# Patient Record
Sex: MALE | Race: WHITE
[De-identification: names, ages, dates, MRNs, and addresses within clinical notes are randomized per-mention and may not be internally consistent; named-entity substitution may affect disease eponyms.]

---

## 2017-01-24 ENCOUNTER — HOSPITAL ENCOUNTER (EMERGENCY)
Dept: HOSPITAL 68 - ERH | Age: 37
End: 2017-01-24
Payer: COMMERCIAL

## 2017-01-24 VITALS — BODY MASS INDEX: 31.5 KG/M2 | HEIGHT: 71 IN | WEIGHT: 225 LBS

## 2017-01-24 VITALS — DIASTOLIC BLOOD PRESSURE: 88 MMHG | SYSTOLIC BLOOD PRESSURE: 128 MMHG

## 2017-01-24 DIAGNOSIS — Z87.891: ICD-10-CM

## 2017-01-24 DIAGNOSIS — J40: Primary | ICD-10-CM

## 2017-01-24 NOTE — ED INFLUENZA/URI COMPLAINT
History of Present Illness
 
General
Chief Complaint: Upper Respiratory Sx/Fever
Stated Complaint: "I HAVE BRONCHITIS"
Source: patient
Exam Limitations: no limitations
 
Vital Signs & Intake/Output
Vital Signs & Intake/Output
 Vital Signs
 
 
Date Time Temp Pulse Resp B/P Pulse O2 O2 Flow FiO2
 
     Ox Delivery Rate 
 
01/24 1544 98.7 101 18 128/88 99 Room Air  
 
 
 ED Intake and Output
 
 
 01/25 0000 01/24 1200
 
Intake Total  
 
Output Total  
 
Balance  
 
   
 
Patient 225 lb 
 
Weight  
 
 
Allergies
Coded Allergies:
Penicillins (ANAPHYLAXIS 01/24/17)
aspirin (ANAPHYLAXIS 01/24/17)
 
Reconcile Medications
Albuterol Sulfate (Proair Hfa) 90 MCG HFA.AER.AD   2 PUF INH Q4-6 PRN PRN SOB
Atorvastatin Calcium 20 MG TABLET   1 TAB PO DAILY CHOLESTEROL  (Reported)
Azithromycin 250 MG TABLET   1 DP PO AD BRONCHITIS
     2 the first day followed by 1 for days 2-5
Benzonatate (Tessalon Perle) 100 MG CAPSULE   1 CAP PO TID COUGH
Insulin Detemir (Levemir Flextouch) 100 UNIT/ML (3 ML) INSULN.PEN   20 UNIT SC 
BID DM  (Reported)
Insulin Lispro (Humalog Kwikpen U-100) 100 UNIT/ML INSULN.PEN   20 UNITS SC BID 
DM  (Reported)
Lisinopril 20 MG TABLET   1 TAB PO DAILY BP  (Reported)
Omeprazole 40 MG CAPSULE.DR   1 CAP PO DAILY GI  (Reported)
Pregabalin (Lyrica) 75 MG CAPSULE   1 CAP PO BID NERVE PAIN  (Reported)
 
Triage Note:
37 Y/O MALE C/O COUGH X 2 WEEKS.  STATES "I THINK
I HAVE BRONCHITIS".  SPEAKING CLEARLY WITH NO
RESP DISTRESS NOTED.  PT STATES HE IS A DIABETIC
BUT "I DONT WANT THAT CHECKED WHILE I AM HERE
TODAY.  I JUST CAME FROM MY ENDOCRINOLOGST OFFICE
SO THATS ALL SET".  PT ALSO STATING "I WANT TO BE
CHECKED OUT FOR IRRITABLE BOWEL WHILE I AM HERE"
C/O "BUBBLING" IN STOMACH AND DIARRHEA.  AFEBRILE.
Triage Nurses Notes Reviewed? yes
Onset: Gradual
Duration: week(s): (2)
Timing: remote history
Severity: moderate
Severity Numbers: 6
Prior Episodes/Possible Cause: occassional episodes
No Modifying Factors: none
HPI:
Patient is a 36-year-old male presenting to the emergency department with chief 
complaint of dry cough has been going on for the past 2 weeks.  Also reporting 
upper respiratory symptoms.  Denies any fevers or chills.  Cough is worse at 
night.  Denies any lower extremity edema.  No recent travel.  No recent 
antibiotic use. ALSO complaining of diarrhea has been going on for the past 
several years.  Denies any black stool.  No abdominal denies any weakness or 
malaise.
(SANDY SPEARS)
 
Past History
 
Travel History
Traveled to [place] past 21 day No
 
Medical History
Any Pertinent Medical History? see below for history
Neurological: NONE
EENT: NONE
Cardiovascular: hypertension, HIGH CHOLESTEROL
Respiratory: NONE
Gastrointestinal: irritable bowel syndrome
Hepatic: NONE
Renal: NONE
Musculoskeletal: NONE
Psychiatric: NONE
Endocrine: diabetes
Blood Disorders: NONE
Cancer(s): NONE
GYN/Reproductive: NONE
 
Surgical History
Surgical History: non-contributory
 
Psychosocial History
What is your primary language English
Tobacco Use: Quit >30 days ago
 
Family History
Hx Contributory? No
(SANDY SPEARS)
 
Review of Systems
 
Review of Systems
Constitutional:
Reports: no symptoms. 
Comments
Review of systems: See HPI, All other systems negative.
Constitutional, no chills fever or weight loss
HEENT: No visual changes no sore throat no congestion
Cardiovascular: No chest pain ,palpitation 
Skin, no jaundice no rashes
Respiratory: No dyspnea  sputum or hemoptysis
GI: No nausea no vomiting
: No dysuria No hematuria
Muscle skeletal: no back pain, no neck pain,
Neurologic: No numbness no confusion
Psych: No stress anxiety or depression,.
Heme/endocrine: No bruising no bleeding 
Immunology: No splenectomy or history of AIDS
(SANDY SPEARS)
 
Physical Exam
 
Physical Exam
General Appearance: well developed/nourished, no apparent distress, alert, awake
, comfortable
Ears, Nose, Throat: nasal congestion, nasal drainage
Comments:
Well-developed well-nourished person in no acute distress
HEENT:. Pupils equally round and reactive to light and accommodation. Nose is 
atraumatic. External auditory canal and Tympanic membranes clear. Pharynx 
normal. No swelling or edema.  Clear nasal discharge bilaterally.
Neck: Supple, no lymphadenopathy, normal range of motion without pain or 
tenderness
Back: Nontender, no CVA tenderness. Full range of motion
Cardiovascular: Regular rate and rhythms no murmurs rubs or gallops, normal JVP
Respiratory: Chest nontender. No respiratory distress.breath sounds clear to 
auscultation bilaterally.  Dry reactive cough on exam.
Abdomen: Soft nontender nondistended, bowel sounds throughout
Extremity: No edema
Neuro: Alert oriented x3.
Skin: No appreciable rash on exposed skin, skin is warm and dry.
Psych: Mood and affect is normal, memory and judgment is normal.
 
Core Measures
Severe Sepsis Present: No
Septic Shock Present: No
(SANDY SPEARS)
 
Progress
Differential Diagnosis: pharyngitis, sinusitis, bronchitis
Plan of Care:
 Current Medications
 
 
  Sig/Rubina Start time  Last
 
Medication Dose  Stop Time Status Admin
 
Dexamethasone 8 MG ONCE ONE 01/24 1715 UNVr 
 
(Decadron)   01/24 1716  
 
 
Initial ED EKG: none
Comments:
Patient will follow up with primary care physician, may need to follow-up with 
GI for further evaluation of chronic diarrhea.  Patient nontoxic.
(SANDY SPEARS)
 
Departure
 
Departure
Time of Disposition: 1702
Disposition: HOME OR SELF CARE
Condition: Stable
Clinical Impression
Primary Impression: Bronchitis
Secondary Impressions: 
Diarrhea
Qualifiers:  Diarrhea type: unspecified type Qualified Code: R19.7 - Diarrhea, 
unspecified
 
Referrals:
WESLEY CULVER,CITLALLI KATHLEEN MD,MITCHEL (PCP/Family)
 
EMMANUEL CLINTON MD
 
Additional Instructions:
Follow-up with your primary care physician collimating of pain.  Also follow-up 
with gastroenterology for chronic diarrhea.  Increase fluids.  Take Z-Jacky as 
prescribed for bronchitis, use albuterol inhaler as directed, take Process 
Prescribed.  YOU WERE Given a Dose of Decadron Here in the Emergency Department.
Departure Forms:
Customer Survey
General Discharge Information
Prescriptions:
Current Visit Scripts
Azithromycin 1 DP PO AD 
     #6 TAB 
     2 the first day followed by 1 for days 2-5
 
Albuterol Sulfate (Proair Hfa) 2 PUF INH Q4-6 PRN PRN SOB
     #1 INHAL 
 
Benzonatate (Tessalon Perle) 1 CAP PO TID 
     #30 CAP 
 
 
(SANDY SPEARS)
 
PA/NP Co-Sign Statement
Statement:
ED Attending supervision documentation-
 
[] I saw and evaluated the patient. I have also reviewed all the pertinent lab 
results and diagnostic results. I agree with the findings and the plan of care 
as documented in the PA's/NP's documentation. 
 
[X] I have reviewed the ED Record and agree with the PA's/NP's documentation.
 
[] Additions or exceptions (if any) to the PAs/NP's note and plan are 
summarized below:
[]
 
(MAGGI BAKER DO)

## 2017-02-13 ENCOUNTER — HOSPITAL ENCOUNTER (INPATIENT)
Dept: HOSPITAL 68 - ERH | Age: 37
LOS: 2 days | DRG: 638 | End: 2017-02-15
Attending: INTERNAL MEDICINE | Admitting: INTERNAL MEDICINE
Payer: COMMERCIAL

## 2017-02-13 VITALS — BODY MASS INDEX: 31.5 KG/M2 | WEIGHT: 225 LBS | HEIGHT: 71 IN

## 2017-02-13 DIAGNOSIS — I10: ICD-10-CM

## 2017-02-13 DIAGNOSIS — N17.9: ICD-10-CM

## 2017-02-13 DIAGNOSIS — E10.10: Primary | ICD-10-CM

## 2017-02-13 DIAGNOSIS — R35.8: ICD-10-CM

## 2017-02-13 DIAGNOSIS — Z79.4: ICD-10-CM

## 2017-02-13 DIAGNOSIS — E10.40: ICD-10-CM

## 2017-02-13 DIAGNOSIS — E10.319: ICD-10-CM

## 2017-02-13 DIAGNOSIS — T38.3X6A: ICD-10-CM

## 2017-02-13 DIAGNOSIS — Z91.128: ICD-10-CM

## 2017-02-13 DIAGNOSIS — Z87.891: ICD-10-CM

## 2017-02-13 DIAGNOSIS — R63.1: ICD-10-CM

## 2017-02-13 DIAGNOSIS — F32.9: ICD-10-CM

## 2017-02-13 LAB
ABSOLUTE GRANULOCYTE CT: 7.4 /CUMM (ref 1.4–6.5)
BASOPHILS # BLD: 0 /CUMM (ref 0–0.2)
BASOPHILS NFR BLD: 0.3 % (ref 0–2)
EOSINOPHIL # BLD: 0 /CUMM (ref 0–0.7)
EOSINOPHIL NFR BLD: 0 % (ref 0–5)
ERYTHROCYTE [DISTWIDTH] IN BLOOD BY AUTOMATED COUNT: 13.3 % (ref 11.5–14.5)
GRANULOCYTES NFR BLD: 80.2 % (ref 42.2–75.2)
HCT VFR BLD CALC: 39.9 % (ref 42–52)
LYMPHOCYTES # BLD: 1.3 /CUMM (ref 1.2–3.4)
MCH RBC QN AUTO: 29 PG (ref 27–31)
MCHC RBC AUTO-ENTMCNC: 34 G/DL (ref 33–37)
MCV RBC AUTO: 85.3 FL (ref 80–94)
MONOCYTES # BLD: 0.5 /CUMM (ref 0.1–0.6)
PLATELET # BLD: 402 /CUMM (ref 130–400)
PMV BLD AUTO: 7.5 FL (ref 7.4–10.4)
RED BLOOD CELL CT: 4.68 /CUMM (ref 4.7–6.1)
WBC # BLD AUTO: 9.3 /CUMM (ref 4.8–10.8)

## 2017-02-13 PROCEDURE — S5012 5% DEXTROSE WITH POTASSIUM: HCPCS

## 2017-02-13 PROCEDURE — G0480 DRUG TEST DEF 1-7 CLASSES: HCPCS

## 2017-02-13 NOTE — NUR
PT BIBA FOR HIGH BLOOD SUGAR. HX:DIABETIC AND PT
HAS NOT BEEN TAKING INSULIN AS SUPPOSED TO SINCE
SATURDAY. BS HERE 462. PT REPORTS TAKING FAST
ACTING INSULIN APPROX 2HRS AGO. C/O NAUSEA AND
DIZZINESS.

## 2017-02-13 NOTE — NUR
**CRITICAL TEST RESULTS**
 
[id number] NEO CAMACHO 36 M
 
TESTS AND RESULTS: GLUCOSE 556
 
Results received and read back by:   JED LUNA
   Results received date and time:   02/13/17 2244
 
The following provider was notified of the results, and read the results back:
                       
       
Notified date and time:  02/13/17 at 6577

## 2017-02-13 NOTE — ED GENERAL ADULT
History of Present Illness
 
General
Chief Complaint: General Adult
Stated Complaint: HIGH GLUCOSE
Source: patient, family, old records, EMS
Exam Limitations: no limitations
 
Vital Signs & Intake/Output
Vital Signs & Intake/Output
 Vital Signs
 
 
Date Time Temp Pulse Resp B/P Pulse O2 O2 Flow FiO2
 
     Ox Delivery Rate 
 
02/13 2248      Room Air  
 
02/13 2216 97.5 110 20 144/65 100 Room Air  
 
 
 ED Intake and Output
 
 
 02/14 0000 02/13 1200
 
Intake Total 1000 
 
Output Total  
 
Balance 1000 
 
   
 
Intake, IV 1000 
 
Patient 200 lb 
 
Weight  
 
 
Allergies
Coded Allergies:
Penicillins (ANAPHYLAXIS 01/24/17)
aspirin (ANAPHYLAXIS 01/24/17)
 
Reconcile Medications
Albuterol Sulfate (Proair Hfa) 90 MCG HFA.AER.AD   2 PUF INH Q4-6 PRN PRN SOB
Atorvastatin Calcium 20 MG TABLET   1 TAB PO DAILY CHOLESTEROL  (Reported)
Ergocalciferol (Vitamin D2) (Vitamin D2) 50,000 UNIT CAPSULE   1 CAP PO Q2W 
SUPPLEMENT  (Reported)
Insulin Detemir (Levemir Flextouch) 100 UNIT/ML (3 ML) INSULN.PEN   20 UNIT SC 
BID DM  (Reported)
Insulin Lispro (Humalog Kwikpen U-100) 100 UNIT/ML INSULN.PEN DM  (Reported)
Lisinopril 20 MG TABLET   1 TAB PO DAILY BP  (Reported)
Omeprazole 40 MG CAPSULE.DR   1 CAP PO DAILY GI  (Reported)
Pregabalin (Lyrica) 150 MG CAPSULE   1 CAP PO DAILY NERVE PAIN  (Reported)
 
Triage Note:
PT BIBA FOR HIGH BLOOD SUGAR. HX:DIABETIC AND PT
 HAS NOT BEEN TAKING INSULIN AS SUPPOSED TO SINCE
 SATURDAY. BS HERE 462. PT REPORTS TAKING FAST
 ACTING INSULIN APPROX 2HRS AGO. C/O NAUSEA AND
 DIZZINESS.
Triage Nurses Notes Reviewed? yes
HPI:
Patient states that yesterday he was mad at his wife's we did not take any of 
his insulin.  Today he states that his blood sugar has been running high so he 
has been taking multiple doses of short-acting insulin without any relief.  
Patient is now nauseous and dry heaving.  Patient states he is getting an 
epigastric burning sensation which then radiates into his throat.  The symptoms 
are constant all day.  There no aggravating or mitigating factors.  Patient 
denies any chest pain or shortness of breath.  He rates the burning sensation as
4 out of 10.
 
Past History
 
Medical History
Any Pertinent Medical History? see below for history
Neurological: NONE
EENT: NONE
Cardiovascular: hypertension, HIGH CHOLESTEROL
Respiratory: NONE
Gastrointestinal: irritable bowel syndrome
Hepatic: NONE
Renal: NONE
Musculoskeletal: NONE
Psychiatric: NONE
Endocrine: diabetes
Blood Disorders: NONE
Cancer(s): NONE
GYN/Reproductive: NONE
 
Surgical History
Surgical History: non-contributory
 
Psychosocial History
What is your primary language English
Tobacco Use: Never used
ETOH Use: occasional use
Illicit Drug Use: denies illicit drug use
 
Family History
Hx Contributory? No
 
Review of Systems
 
Review of Systems
Constitutional:
Reports: no symptoms. 
EENTM:
Reports: no symptoms. 
Respiratory:
Reports: no symptoms. 
Cardiovascular:
Reports: no symptoms. 
GI:
Reports: see HPI, abdominal pain, nausea. 
Genitourinary:
Reports: no symptoms. 
Musculoskeletal:
Reports: no symptoms. 
Skin:
Reports: no symptoms. 
Neurological/Psychological:
Reports: no symptoms. 
Hematologic/Endocrine:
Reports: no symptoms. 
Immunologic/Allergic:
Reports: no symptoms. 
All Other Systems: Reviewed and Negative
 
Physical Exam
 
Physical Exam
General Appearance: well developed/nourished, alert, awake, anxious, moderate 
distress
Head: atraumatic, normal appearance
Eyes:
Bilateral: PERRL, EOMI. 
Ears, Nose, Throat: normal pharynx, DRY MUCOSA
Neck: normal inspection, supple, full range of motion
Respiratory: normal breath sounds, chest non-tender, no respiratory distress, 
lungs clear
Cardiovascular: normal peripheral pulses, tachycardia
Gastrointestinal: normal bowel sounds, soft, non-tender, no organomegaly
Back: normal inspection
Extremities: normal inspection, normal capillary refill, normal range of motion,
no edema
Neurologic/Psych: no motor/sensory deficits, awake, alert, oriented x 3, normal 
mood/affect
Skin: intact, normal color, warm/dry
Lymphatic: no anterior cervical gary
 
Core Measures
ACS in differential dx? No
CVA/TIA Diagnosis: No
Severe Sepsis Present: No
Septic Shock Present: No
 
Progress
Differential Diagnoses
I considered the following diagnoses in my evaluation of the patient: [DKA, 
electrolyte abnormality, dehydration]
 
Plan of Care:
 Orders
 
 
Procedure Date/time Status
 
Patient Data 02/14 0009 Active
 
Admit to inpatient 02/14 0004 Active
 
MIXED VENOUS BLOOD GAS (GEN) 02/13 2157 Complete
 
URINALYSIS 02/13 2157 Complete
 
TROPONIN LEVEL 02/13 2157 Complete
 
LIPASE 02/13 2157 Complete
 
COMPREHENSIVE METABOLIC PANEL 02/13 2157 Complete
 
CBC WITHOUT DIFFERENTIAL 02/13 2157 Complete
 
AMYLASE 02/13 2157 Complete
 
ACETONE 02/13 2157 Complete
 
EKG 02/13 2157 Active
 
 
 Current Medications
 
 
  Sig/Rubina Start time  Last
 
Medication Dose  Stop Time Status Admin
 
Famotidine 20 MG ONCE ONE 02/14 0015 AC 
 
(Pepcid)   02/14 0016  
 
Insulin Human Regular 100 UNIT Q24H 02/13 2345 UNVr 
 
(Novolin R (Insulin      
 
Drip))     
 
Sodium Chloride 100 ML    
 
(Normal Saline 0.9%)     
 
 
 Laboratory Tests
 
 
 
02/13/17 2340:
Urinalysis MOD  H, Urine Color YEL, Urine Clarity CLEAR, Urine pH 5.5, Ur 
Specific Gravity 1.025, Urine Protein TRACE  H, Urine Ketones 40  H, Urine 
Nitrite NEG, Urine Bilirubin POS@ICTO  H, Urine Urobilinogen 0.2, Ur Leukocyte 
Esterase NEG, Ur Microscopic SEDIMENT EXAMINED, Urine RBC 1-3, Urine WBC 1-3  H,
Ur Epithelial Cells FEW, Urine Mucus MOD  H, Urine Hemoglobin NEG, Urine Glucose
>=1000  H
 
02/13/17 2218:
Anion Gap 21  H, Estimated GFR 30  L, BUN/Creatinine Ratio 16.8, Glucose 556 *H,
Calcium 9.7, Total Bilirubin 0.8, AST 19, ALT 54, Alkaline Phosphatase 156  H, 
Troponin I < 0.01, Total Protein 7.0, Albumin 4.2, Globulin 2.8, Albumin/
Globulin Ratio 1.5, Amylase < 30  L, Lipase 32, CBC w Diff NO MAN DIFF REQ, RBC 
4.68  L, MCV 85.3, MCH 29.0, RDW 13.3, MPV 7.5, Gran % 80.2  H, Lymphocytes % 
14.3  L, Monocytes % 5.2, Eosinophils % 0, Basophils % 0.3, Absolute 
Granulocytes 7.4  H, Absolute Lymphocytes 1.3, Absolute Monocytes 0.5, Absolute 
Eosinophils 0, Absolute Basophils 0, PUBS MCHC 34.0, Acetone Level POSITIVE AT 1
:16 DIL
 
02/13/17 2210:
Bicarbonate Actual 16  L, Mixed VBG pH 7.34, Mixed VBG pCO2 31  L, Mixed VBG O2 
Saturation 37, Carboxyhemoglobin 1.0  L, O2 Concentration % R/A, Phlebotomy Draw
Site VENOUS
Initial ED EKG: SINUS TACHYCARDIA WITH NO st-t CHANGES.
 
Departure
 
Departure
Disposition: STILL A PATIENT
Condition: Guarded
Clinical Impression
Primary Impression: DKA (diabetic ketoacidoses)
Secondary Impressions: Acute renal failure
Referrals:
MITCHEL KATHLEEN MD (PCP/Family)
 
Departure Forms:
Customer Survey
General Discharge Information
 
Admission Note
Spoke With:
KINGSTON LOBO MD
Documentation of Exam:
Documentation of any treatments & extenuating circumstances including Concerns 
Regarding Discharge (functional status, medication knowledge or non-compliance, 
living conditions, etc.) that warrant an admission rather than observation: [IV 
FLUIDS, INSUILIN DRIP, ENDOCRINE CONSULT, RENAL CONSULT]
 
 
Critical Care Note
 
Critical Care Note
Critical Care Time: mins: (45 MIN)

## 2017-02-14 VITALS — DIASTOLIC BLOOD PRESSURE: 60 MMHG | SYSTOLIC BLOOD PRESSURE: 118 MMHG

## 2017-02-14 VITALS — DIASTOLIC BLOOD PRESSURE: 60 MMHG | SYSTOLIC BLOOD PRESSURE: 122 MMHG

## 2017-02-14 NOTE — HISTORY & PHYSICAL
TRACIE CULVER,[facility] 17 0017:
General Information and HPI
MD Statement:
I have seen and personally examined NEO RODRIGUEZ and documented this H&P.
 
The patient is a 36 year old M who presented with a patient stated chief 
complaint of nausea, abdominal pain and generalized weakness. 
 
Source of Information: patient, family, old records
Exam Limitations: no limitations, unable to give history, patient's age
History of Present Illness:
Mr Rodriguez is a 36-year-old gentleman with past medical history of type 1 
diabetes mellitus, depression, questionable irritable bowel syndrome, as well as
multiple admissions for DKA(last one approximately 18 months ago at [facility]) who presented to the emergency 2017 after experiencing nausea, 
abdominal pain, polyuria and polydipsia.  Patient states on 2017 
when feeling extremely depressed he stopped taking in any food.  
He continued consuming high volume of water.  
On 2017 the patient reported that he began to feel nauseous and had 
multiple episodes of dry heaving.  On 2017 in the early hours the 
patient began taking in oral intake and states that he began with a short acting
insulin.  In the early evening while at the [place] train station he reports 
increased weakness and subsequently asked the guard on duty to notify EMS who 
brought him to the emergency department at [facility]. Patient also 
endorses chest pain secondary to what he claims is a long history of GERD.  
He also reports a non productive cough and states that he's recently completed a
dose of azithromycin following a visit to the emergency department at [facility] on 2017.
 
 
Allergies/Medications
Allergies:
Coded Allergies:
Penicillins (ANAPHYLAXIS 17)
aspirin (ANAPHYLAXIS 17)
 
Home Med list
Albuterol Sulfate (Proair Hfa) 90 MCG HFA.AER.AD   2 PUF INH Q4-6 PRN PRN SOB
Atorvastatin Calcium 20 MG TABLET   1 TAB PO DAILY CHOLESTEROL  (Reported)
Ergocalciferol (Vitamin D2) (Vitamin D2) 50,000 UNIT CAPSULE   1 CAP PO Q2W 
SUPPLEMENT  (Reported)
Insulin Detemir (Levemir Flextouch) 100 UNIT/ML (3 ML) INSULN.PEN   20 UNIT SC 
BID DM  (Reported)
Insulin Detemir (Levemir Flextouch) 100 UNIT/ML (3 ML) INSULN.PEN   16 UNIT SC 
BID DM
Insulin Lispro (Humalog Kwikpen U-100) 100 UNIT/ML INSULN.PEN   0 SC TIDAC DM  (
Reported)
     Based on carbohydrate counting sliding scale
Lisinopril 20 MG TABLET   1 TAB PO DAILY BP  (Reported)
Omeprazole 40 MG CAPSULE.DR   1 CAP PO DAILY GI  (Reported)
Pregabalin (Lyrica) 150 MG CAPSULE   1 CAP PO DAILY NERVE PAIN  (Reported)
 
Compliance With Home Meds: POOR
 
Past History
 
Travel History
Traveled to [place] past 21 day No
 
Medical History
Neurological: NONE
EENT: NONE
Cardiovascular: hypertension, HIGH CHOLESTEROL
Respiratory: NONE
Gastrointestinal: irritable bowel syndrome
Hepatic: NONE
Renal: NONE
Musculoskeletal: NONE
Psychiatric: NONE
Endocrine: diabetes
Blood Disorders: NONE
Cancer(s): NONE
GYN/Reproductive: NONE
 
Surgical History
Surgical History: non-contributory
 
Past Family/Social History
 
Family History
Relations & Conditions if any
FATHER, , Age 50-60; Cause: Cancer.
 
 
Psychosocial History
Where do you live? Other ([place])
Who Do You Live With? spouse, child
Services at Home: None
Primary Language: English
Smoking Status: Former Smoker
ETOH Use: occasional use, Social Drinker 
Illicit Drug Use: denies illicit drug use
 
Functional Ability
ADLs
Independent: dressing, eating, toileting, bathing. 
Ambulation: independent
IADLs
Independent: shopping, housework, finances, food prep, telephone, transportation
, medication admin. 
 
Employment History
Employment Unemployed
 
Review of Systems
 
Review of Systems
Constitutional:
Denies: chills, diaphoresis, fever, malaise, weakness. 
Cardiovascular:
Reports: chest pain.  Denies: edema, orthopena, palpitations. 
Respiratory:
Reports: cough.  Denies: hemoptysis, orthopnea, short of breath, sputum 
production, wheezing. 
GI:
Reports: abdominal pain, diarrhea, vomiting.  Denies: distention, bowel 
incontinence, nausea, bloody stool, changes in stool. 
Genitourinary:
Denies: discharge, dysuria, frequency, hematuria. 
Musculoskeletal:
Denies: back pain, gout, joint pain, joint swelling. 
Skin:
Denies: change in skin color, change in hair/nails. 
 
Exam & Diagnostic Data
Last 24 Hrs of Vital Signs/I&O
 Vital Signs
 
 
Date Time Temp Pulse Resp B/P Pulse O2 O2 Flow FiO2
 
     Ox Delivery Rate 
 
 0032 98.3 113 18 121/57 100 Room Air  
 
 2248      Room Air  
 
2216 97.5 110 20 144/65 100 Room Air  
 
 
 Intake & Output
 
 
  0800  0000  1600
 
Intake Total  1000 
 
Output Total   
 
Balance  1000 
 
    
 
Intake, IV  1000 
 
Patient  90.718 kg 
 
Weight   
 
 
 
 
Physical Exam
General Appearance Alert, Oriented X3, Cooperative, No Acute Distress
HEENT Atraumatic, PERRLA
Neck Supple
Lymphatic Axillary nl
Cardiovascular Normal S1, Normal S2, No Murmurs
Lungs Clear to Auscultation
Abdomen Normal Bowel Sounds, Soft, No Tenderness
Neurological Sensation Intact, Cranial Nerves 3-12 NL, Slow Speech. 
Extremities No Cyanosis, No Edema
Last 24 Hrs of Labs/[place]:
 Laboratory Tests
 
170:
Urinalysis MOD  H, Urine Color YEL, Urine Clarity CLEAR, Urine pH 5.5, Ur 
Specific Gravity 1.025, Urine Protein TRACE  H, Urine Ketones 40  H, Urine 
Nitrite NEG, Urine Bilirubin POS@ICTO  H, Urine Urobilinogen 0.2, Ur Leukocyte 
Esterase NEG, Ur Microscopic SEDIMENT EXAMINED, Urine RBC 1-3, Urine WBC 1-3  H,
Ur Epithelial Cells FEW, Urine Mucus MOD  H, Urine Hemoglobin NEG, Urine Glucose
>=1000  H
 
17:
Anion Gap 21  H, Estimated GFR 30  L, BUN/Creatinine Ratio 16.8, Glucose 556 *H,
Calcium 9.7, Total Bilirubin 0.8, AST 19, ALT 54, Alkaline Phosphatase 156  H, 
Troponin I < 0.01, Total Protein 7.0, Albumin 4.2, Globulin 2.8, Albumin/
Globulin Ratio 1.5, Amylase < 30  L, Lipase 32, CBC w Diff NO MAN DIFF REQ, RBC 
4.68  L, MCV 85.3, MCH 29.0, RDW 13.3, MPV 7.5, Gran % 80.2  H, Lymphocytes % 
14.3  L, Monocytes % 5.2, Eosinophils % 0, Basophils % 0.3, Absolute 
Granulocytes 7.4  H, Absolute Lymphocytes 1.3, Absolute Monocytes 0.5, Absolute 
Eosinophils 0, Absolute Basophils 0, PUBS MCHC 34.0, Acetone Level POSITIVE AT 1
:16 DIL
 
17:
Bicarbonate Actual 16  L, Mixed VBG pH 7.34, Mixed VBG pCO2 31  L, Mixed VBG O2 
Saturation 37, Carboxyhemoglobin 1.0  L, O2 Concentration % R/A, Phlebotomy Draw
Site VENOUS
 
 
Assessment/Plan
Assessment:
This is a 36-year-old gentleman with long-standing history of diabetes who 
presented to the emergency department with elevated blood sugar of 556 in 
addition to the above he also endorsed abdominal pain, nausea, polyuria and 
polydipsia.
 
#Diabetic ketoacidosis.
Admit patient to ICU. 
Patient's blood sugar  on admission, 556 as well as increased anion gap of 21 
suggested the patient is in diabetic ketoacidosis.  We will continue hydrating 
the patient.  Patient is currently on an insulin drip running at 8 units.  We 
will tailor drip to ensure anion gap is closing.
Accu-Cheks every hour.
Once blood sugars below 250 consider sliding scale insulin.  
If patient's anion gap increases (>21) continue management however change IV 
normal saline  to D5 to ensure patient does not become hypoglycemic. 
Endocrinology consultation in AM.
Above plans were discussed with Dr. Falk.
For Nausea, Continue Zofran 4 mg IV. 
 
#Acute kidney injury.
Patient's creatinine upon admission was 2.5.  This an elevation from his 
baseline of 1.0. Likely Pre-renal, due to decreased PO intake and DKA.
Continue IV hydration.  
Monitor renal function in a.m.
Avoid any nephrotoxic medications and oral agents.
 
#History of mood disturbance
Continue the patient on Lyrica.
The patient states that he is not actively suicidal.  He denies any homicidal 
intention.  He denies any interest or intention and hurting his significant 
other.  
Patient states that he would not like a psychiatry consult and refused when 
repeatedly asked for permission to request one.
Can consider asking again in the AM.
 
#Questionable irritable bowel syndrome
If Symptoms worsen consider GI cosult. 
Given recent Antibiotic Use, if symptoms worsen, consider Stool Culture.
 
#Diet 
nothing by mouth
 
#DVT prophylaxis 
Lovenox
 
#Code 
Full code 
 
As Ranked By This Provider
Problem List:
 1. DKA (diabetic ketoacidoses)
 
 2. Diarrhea
 
 3. Bronchitis
 
 
Core Measures/Miscellaneous
 
Acute Coronary Syndrome
ACS Diagnosis: No
 
Cerebrovascular Accident
CVA/TIA Diagnosis: No
 
Congestive Heart Failure
CHF Diagnosis: No
 
Venous Thromboembolism
VTE Risk Factors: Acute medical illness
VTE Prophylaxis Ordered Inpt: Pharm- Heparin
No Mech VTE prophylaxis d/t: No contraindications
No VTE Pharm Prophylaxis d/t: No contraindications
VTE Diagnosis: No
VTE Type: NONE
VTE Confirmed by (Test): NONE
 
Severe Sepsis
Severe Sepsis Present: No
 
Septic Shock
Septic Shock Present: No
 
Miscellaneous Documentation
Attending Case Discussed With:
KINGSTON LOBO MD
 
Primary Care Physician:
MITCHEL KATHLEEN MD
 
Patient sees these Specialists
Dr Vega
Level of Patient Care: Critical Care (CRI)
 
 
MARILYN ROCKWELL 17 0205:
Resident Review Statement
Resident Statement: examined this patient, discussed with intern, agreed with 
intern, discussed with family, reviewed EMR data (avail), discussed with nursing
, discussed with case mgmt, reviewed images, amended to note
Other Findings:
36-year-old gentleman with history of type 1 diabetes, multiple admissions due 
to noncompliance with insulin treatment (latest one about a year and half ago at
[facility] with a diagnosis of DKA) presented to the emergency room 
complaining of 3-day, progressive weakness, nausea and vmitting and abdominal 
pain.  Patient is a poor historian and the details of the HPI was confirmed with
his wife who presents at the bedside.  He follows up with Dr. Vega for his 
diabetes and about 3 weeks ago started on by mouth diet and insulin therapy and 
according to him he has been more compliant than before.  On , after he
" felt depressed" and  refused to eating food and taking his insulin.  Patient 
reports weakness, headache, mild abdominal pain, polyuria and polydipsia and 
nocturia.  He denies any fever, chills, runny nose sputum production sick 
contact or recent travel, and  symptoms.  Yesterday morning, he restarted 
taking his short-acting insulin per meal with minimal improvement of his 
symptoms. 
Social history: Patient denies illicit drugs/smoking, quit drinking alcohol 
while ago, currently unemployed, has multiple incarceration and legal. 
ROS: + Headache, mild nausea status and abdominal pain and weakness, and dry 
mouth.  VS: WNL; Ph/Ex: ao x3, not in acute distress; HEET: Mucous membranes are
dry; CV: S1-S2 no murmur; abdomen: Mild generalized tenderness no guarding no 
rebound; extremities: Warm; normal capillary refilling time. Back: there is a 
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
Personal findings 
EKG sinus rhythm rate 108 normal axis, no prolongation of QT MS and QRS no 
bundle branch block no STT segment change; 
Troponin less than 0.01, sodium 134, potassium 5.4, BUN 42 creatinine 2.5 anion 
gap 21 glucose 556 ,amylase and lipase normal LFTs normal, WBC 9.3 with left 
shift and 4 band, H and H within normal limits; ABG bicarbonate 16 pH 7.34 PCO2 
31
Assessment and plan
36-year-old gentleman was admitted to ICU for diabetes ketoacidosis. 
List of active problems
#1 DKA: The cause of DKA for this patient considering his history of 
noncompliance with insulin treatment and his current presentation is most 
possibly insulin deficiency.  Other causes including infection, inflammation and
infarction has been worked up. 
Plan
* Admit to ICU
* Nothing by mouth
* Continue IV hydration with normal saline
* accu check Q1 
* Continue insulin drip; adjust insulin drip per Accu-Chek every hour
* As soon as ordering gap closes start the patient on long-acting or 
subcutaneous insulin and breech one hour with insulin drip
* If blood glucose <250 mg/dL and ongoing gap is is still elevated started 
patient on D5 normal saline and keep insulin drip going 
* Check ICU bundle every 4 hours for ongoing gap and potassium
* Follow endocrinology recommendation in the a.m.
#2 CHAZ: Most possibly due to profound dehydration in the setting of diabetes 
ketoacidosis
* Continue IV hydration
* Repeat ICU bundle in the a.m.
* Hold nephrotoxic agents; patient's lisinopril was held, restart after AKA I 
and hyperkalemia was resolved
#3 history of depression without suicidal and homicidal ideation or plan
* Patient refused to see a psychiatric wrist in the hospital
* social Work consult in the a.m.
Diet: Nothing by mouth
Full code
Moderate pain pathway
 
 
KINGSTON LOBO 17 0352:
Attending MD Review Statement
 
Attending Statement
Attending MD Statement: examined this patient, discuss w/resident/PA/NP, agreed 
w/resident/PA/NP, discussed with family, reviewed EMR data (avail), reviewed 
images, amended to note
Attending Assessment/Plan:
 
CC: High blood glucose
PMH: Type I DM, HTN, diabetic neuropathy, depression 
 
Patient states that he had an episode of "acute phase of depression" on 2 days 
back so he stopped taking insulin, stopped eating. According to ER notes, he was
mad at his wife's we did not take any of his insulin. He was having weakness, 
abdominal pain, nausea. He tried to resume his short acting insulin and try to 
eat but sugars were uncontrolled. Today his blood sugar has been running high 
even after multiple doses of short-acting insulin. So he came to ER. Patient 
complains of nausea and dry heaving, epigastric burning sensation radiating to 
throat. There no aggravating or mitigating factors. Patient has chronic chest 
pain and mild cough, denies fever, chills, shortness of breath. Patient and his 
wife had been in process of moving to [place], but had been contemplating about 
the move. Patient denies any suicidal ideation, attempts, homicidal ideation, 
hallucinations. He does not want to see psychiatrist for depression. 
 
Vitals: Afebrile, tachycardic, RR, BP, O2 saturation within acceptable range. On
exam: A O 3, appropriate response to questions, no respiratory distress, mucosa
extremely dry, neck supple, no lymphadenopathy, no JVD multiple scratch marks on
body. Mild erythema on the inner aspect of right thigh and neck without any 
inflammation. Peripheral pulses and perfusion normal, no pitting edema. CVS: S1-
S2, RRR, tachycardia. RS: Clear to auscultate bilaterally. Abdomen: Soft, NT, ND
, bowel sounds present. No focal neurological deficits. 
Labs WBC 9.3 with neutrophils 80%, creatinine 2.5 increased from 1.1 from 
, anion gap metabolic acidosis with hyperglycemia, alkaline 
phosphatase 156, acetone positive, potassium 5.4. 
 
A and P 
#1 DKA: Most likely secondary to noncompliance, exclude infections, obtain blood
culture, UA urine culture, lactate level, chest x-ray, trend troponin repeat EKG
to rule out any other precipitating cause. Check U tox, check serum alcohol 
level. Patient received bolus insulin regular in ER, currently on insulin drip, 
continue insulin drip informed endocrinologist, repeat BMP every 4 hours, Accu-
Cheks every hourly, titrate the drip accordingly. If blood sugar dropping change
maintenance fluids to D5 NS with potassium 20-40 mEq. Aggressive hydration with 
at least 3 NS bolus followed by maintenance drip. Once anion gap is closed can 
be changed to basal bolus insulin according to endocrinology recommendation.
 
#2 depression: Intentional stopping insulin, denies suicidal ideation, attempt 
refuses to see psychiatrist
 
#3 hypertension: Hold lisinopril secondary to high
 
#4 acute kidney injury: Probably secondary to volume contraction continue 
aggressive hydration, repeat BMP in a.m., strict I's and O's. 
 
#5 DVT prophylaxis with heparin.
 
TTS 30 min

## 2017-02-14 NOTE — NUR
Patient and family notified this RN that he was scheduled for an outpatient
ultrasound of his spine today at 1430 at [place]. Ultrasound was then called
by this RN and notified them that he has been admitted to room 110- Per
ultrasound supervisor this test can not be completed while he is an inpatient
since it is not related to his admitting diagnosis- Patient and wife notified
and to reschedule appointment after discharge. Dr. Lopez notified of this and
stated that ultrasound is not needed until after discharge.
 
Patient has now been downgraded to general medicine. House staff had been in
previously to dicuss plan of care with pt and wife. C/o of headache that was
relieved by Tylenol for a short period. Ice pack was provided. Motrin ordered
however patient is sleeping comfortably in room- Report given to oncoming RN.
SUSAN

## 2017-02-14 NOTE — ADMISSION CERTIFICATION
Admission Certification
 
Certification Statement
- As attending physician, I certify that at the time of
- admission, based on clinical presentation, severity of
- symptoms, need for further diagnostic testing and
- therapeutic interventions, and risk of adverse outcomes
- without in-hospital treatment, in my clinical assessment,
- this patient requires an acute hospital stay for a minimum
- of two nights or longer. I have also considered psychsocial
- factors such as support system, advanced age, financial
- issues, cognitive issues, and failed out-patient treatments,
- past re-admission history, safety of patient, and lack of
- compliance as applicable.
Specific rationale supporting this admission is:
DKA

## 2017-02-14 NOTE — NUR
NOVOLIN R INSULIN DRIP INITIATED AT 8ML/HR PER COLUMN 2 AS PER 
ORDER FOR BS >500. WILL CTM AND RECHECK.

## 2017-02-14 NOTE — CONS- [facility]
General Information and HPI
 
Consulting Request
Date of Consult: 17
Requested By:
med team
History of Present Illness:
Mr Rodriguez is a 36-year-old gentleman with past medical history of type 1 
diabetes mellitus, depression, questionable irritable bowel syndrome, as well as
multiple admissions for DKA(last one approximately 18 months ago at [facility]) who presented to the emergency 2017 after experiencing nausea, 
abdominal pain, polyuria and polydipsia.  Patient states on 2017 
when feeling extremely depressed he stopped taking in any food.  
He continued consuming high volume of water.  
On 2017 the patient reported that he began to feel nauseous and had 
multiple episodes of dry heaving.  On 2017 in the early hours the 
patient began taking in oral intake and states that he began with a short acting
insulin.  In the early [place] he reports 
increased weakness and subsequently asked the guard on duty to notify EMS who 
brought him to the emergency department at [facility]. Patient also 
endorses chest pain secondary to what he claims is a long history of GERD.  
He also reports a non productive cough and states that he's recently completed a
dose of azithromycin following a visit to the emergency department at [facility] on 2017.
 
He is on Levemir and NovoLog at home.  Apparently he stopped taking his insulin 
wife.  He then tried to resume some insulin but began to feel worse and 
eventually came to the emergency room.
 
The patient has been treated with normal saline as well as an insulin drip.  Had
acute renal insufficiency with a creatinine 2.5 which is now improving with 
hydration.  He can with the patient he states he feels improved.  He is hungry 
and feels he will be able to eat breakfast.
 
Review of Systems
Constitutional:
Denies: chills, diaphoresis, fever, malaise, weakness. 
Cardiovascular:
Reports: chest pain.  Denies: edema, orthopena, palpitations. 
Respiratory:
Reports: cough.  Denies: hemoptysis, orthopnea, short of breath, sputum 
production, wheezing. 
GI:
Reports: abdominal pain, diarrhea, vomiting.  Denies: distention, bowel 
incontinence, nausea, bloody stool, changes in stool. 
Genitourinary:
Denies: discharge, dysuria, frequency, hematuria. 
Musculoskeletal:
Denies: back pain, gout, joint pain, joint swelling. 
Skin:
Denies: change in skin color, change in hair/nails. 
 
 
Allergies/Medications
Allergies:
Coded Allergies:
Penicillins (ANAPHYLAXIS 17)
aspirin (ANAPHYLAXIS 17)
 
Home Med List:
Albuterol Sulfate (Proair Hfa) 90 MCG HFA.AER.AD   2 PUF INH Q4-6 PRN PRN SOB
Atorvastatin Calcium 20 MG TABLET   1 TAB PO DAILY CHOLESTEROL  (Reported)
Ergocalciferol (Vitamin D2) (Vitamin D2) 50,000 UNIT CAPSULE   1 CAP PO Q2W 
SUPPLEMENT  (Reported)
Insulin Detemir (Levemir Flextouch) 100 UNIT/ML (3 ML) INSULN.PEN   20 UNIT SC 
BID DM  (Reported)
Insulin Lispro (Humalog Kwikpen U-100) 100 UNIT/ML INSULN.PEN DM  (Reported)
Lisinopril 20 MG TABLET   1 TAB PO DAILY BP  (Reported)
Omeprazole 40 MG CAPSULE.DR   1 CAP PO DAILY GI  (Reported)
Pregabalin (Lyrica) 150 MG CAPSULE   1 CAP PO DAILY NERVE PAIN  (Reported)
 
 
Review of Systems
 
Review of Systems
Constitutional:
Reports: see HPI. 
 
Past History
 
Travel History
Traveled to [place] past 21 day No
 
Medical History
Blood Transfusion Hx: No
Neurological: NONE
EENT: NONE
Cardiovascular: hypertension, HIGH CHOLESTEROL
Respiratory: NONE
Gastrointestinal: irritable bowel syndrome
Hepatic: NONE
Renal: NONE
Musculoskeletal: NONE
Psychiatric: NONE
Endocrine: diabetes
Blood Disorders: NONE
Cancer(s): NONE
GYN/Reproductive: NONE
 
Surgical History
Surgical History: non-contributory
 
Family History
Relations & Conditions If Any:
FATHER, , Age 50-60; Cause: Cancer.
 
 
Psychosocial History
Where Do You Live? Other ([place])
Who Do You Live With? spouse, child
Services at Home: None
Primary Language: English
Smoking Status: Former Smoker
ETOH Use: occasional use, Social Drinker 
Illicit Drug Use: denies illicit drug use
 
Functional Ability
ADLs
Independent: dressing, eating, toileting, bathing. 
Ambulation: independent
IADLs
Independent: shopping, housework, finances, food prep, telephone, transportation
, medication admin. 
 
Employment History
Employment: Unemployed
 
Exam & Diagnostic Data
Last 24 Hrs of Vital Signs/I&O
 Vital Signs
 
 
Date Time Temp Pulse Resp B/P Pulse O2 O2 Flow FiO2
 
     Ox Delivery Rate 
 
 0800 98.0 103 20 118/60 96 Room Air  
 
 0800     96 Room Air  
 
 0317     97 Room Air Room Air 
 
 0142 98.2 108 20 131/64 97 Room Air  
 
 0032 98.3 113 18 121/57 100 Room Air  
 
 2248      Room Air  
 
 2216 97.5 110 20 144/65 100 Room Air  
 
 
 Intake & Output
 
 
  1600  0800  0000
 
Intake Total  477 2000
 
Output Total  500 500
 
Balance  -23 1500
 
    
 
Intake, IV  477 2000
 
Output, Urine  500 500
 
Patient  225 lb 200 lb
 
Weight   
 
 
 
Last 48 Hrs of Labs/Rayshawn:
 Laboratory Tests
 
17 0900:
Sodium Cancelled, Potassium Cancelled, Chloride Cancelled, Carbon Dioxide 
Cancelled, Anion Gap Cancelled, BUN Cancelled, Creatinine Cancelled, Glucose 
Cancelled, Calcium Cancelled, Phosphorus Cancelled, Magnesium Cancelled, Total 
Bilirubin Cancelled, AST Cancelled, ALT Cancelled, Albumin Cancelled
 
17 0525:
pH 7.41, pCO2 32  L, pO2 76  L, HCO3 20  L, ABG O2 Sat (Measured) 95.0  L, P-50 
(Temp Corrected) Y, Carboxyhemoglobin 0.7  L, O2 Concentration % RA, Temperature
98.8, Phlebotomy Draw Site LEFT RADIAL
 
17 0500:
Troponin I < 0.01
 
17 0500:
Anion Gap 15, Estimated GFR 46  L, Glucose 151  H, Calcium 9.3, Phosphorus 2.8, 
Magnesium 2.3, Total Bilirubin 0.5, AST 17, ALT 50, Albumin 3.7
 
17 0251:
Serum Alcohol Cancelled
 
17 0251:
Methadone Screen Cancelled, Barbiturate Screen Cancelled, Ur Phencyclidine Scrn 
Cancelled, Amphetamines Screen Cancelled, U Benzodiazepines Scrn Cancelled, 
Urine Cocaine Screen Cancelled, Urine Cannabis Screen Cancelled
 
17 0155:
Anion Gap 23  H, Estimated GFR 38  L, BUN/Creatinine Ratio 19.5
 
17 2340:
Urine Opiates Screen < 100.00, Methadone Screen < 40, Barbiturate Screen < 60, 
Ur Phencyclidine Scrn < 6.00, Amphetamines Screen < 100, U Benzodiazepines Scrn 
< 85, Urine Cocaine Screen < 50, Urine Cannabis Screen < 5.00, Urinalysis MOD  H
, Urine Color YEL, Urine Clarity CLEAR, Urine pH 5.5, Ur Specific Gravity 1.025,
Urine Protein TRACE  H, Urine Ketones 40  H, Urine Nitrite NEG, Urine Bilirubin 
POS@ICTO  H, Urine Urobilinogen 0.2, Ur Leukocyte Esterase NEG, Ur Microscopic 
SEDIMENT EXAMINED, Urine RBC 1-3, Urine WBC 1-3  H, Ur Epithelial Cells FEW, 
Urine Mucus MOD  H, Urine Hemoglobin NEG, Urine Glucose >=1000  H
 
17:
Anion Gap 21  H, Estimated GFR 30  L, BUN/Creatinine Ratio 16.8, Glucose 556 *H,
Lactic Acid 1.4, Calcium 9.7, Total Bilirubin 0.8, AST 19, ALT 54, Alkaline 
Phosphatase 156  H, Troponin I < 0.01, Total Protein 7.0, Albumin 4.2, Globulin 
2.8, Albumin/Globulin Ratio 1.5, Amylase < 30  L, Lipase 32, CBC w Diff NO MAN 
DIFF REQ, RBC 4.68  L, MCV 85.3, MCH 29.0, RDW 13.3, MPV 7.5, Gran % 80.2  H, 
Lymphocytes % 14.3  L, Monocytes % 5.2, Eosinophils % 0, Basophils % 0.3, 
Absolute Granulocytes 7.4  H, Absolute Lymphocytes 1.3, Absolute Monocytes 0.5, 
Absolute Eosinophils 0, Absolute Basophils 0, PUBS MCHC 34.0, Serum Alcohol < 
10.0, Acetone Level POSITIVE AT 1:16 DIL
 
17:
Bicarbonate Actual 16  L, Mixed VBG pH 7.34, Mixed VBG pCO2 31  L, Mixed VBG O2 
Saturation 37, Carboxyhemoglobin 1.0  L, O2 Concentration % R/A, Phlebotomy Draw
Site VENOUS
 
 
Assessment/Plan
Impression/Plan:
Physical Exam
General Appearance Alert, Oriented X3, Cooperative, No Acute Distress
HEENT Atraumatic, PERRLA
Neck Supple
Lymphatic Axillary nl
Cardiovascular Normal S1, Normal S2, No Murmurs
Lungs Clear to Auscultation
Abdomen Normal Bowel Sounds, Soft, No Tenderness
Neurological Sensation Intact, Cranial Nerves 3-12 NL, Slow Speech. 
Extremities No Cyanosis, No Edema
 
SIGNIFICANT DATA SIGNIFICANT DATA
 
Blood work reviewed as noted above
Creatinine down to 1.7 went up to 2.5
Anion gap is now 15
White count 9.3 hemoglobin 13.6 platelets 402
ABG reviewed from his early this morning 74
 
IMPRESSION 
This is a 36-year-old gentleman with type 1 diabetes who is on Levemir and 
NovoLog at home this seems to be noncompliant with his insulin now here with
 
Significant DKA which is slowly improving after fluid resuscitation and insulin
Acute renal failure in a gentleman with type 1 diabetes which is slowly 
improving after he was fluid resuscitated.  Patient was on lisinopril which may 
have made it worse this needs to be restarted slowly once he is better
History of depression without any suicidal or homicidal ideation needs eventual 
psychiatric evaluation
 
RECOMMENDATION 
As he is improved his insulin drip could be stopped and he is to be started on 
long-acting insulin and sliding scale as noted by endocrinology
 
Continue his IV fluids as noted by endocrinology and this can be stopped once he
is stable
 
Hold lisinopril this needs to be started later
 
DC baclofen (was not on it)
 
PT wishes no psychiatric eval and he might need to be on ssri in the future and 
he wishes to think about it
 
DVT prophylaxis
 
 
PT critically ill before tts 40 mins
 
 
Consult Acknowledgment
- Thank you for your consult request.

## 2017-02-14 NOTE — CONS- ENDOCRINOLOGY
General Information and HPI
 
Consulting Request
Date of Consult: 17
Requested By: medical team
Reason for Consult:
Diabetic ketoacidosis
Source of Information: patient, old records
Exam Limitations: poor historian
History of Present Illness:
This 36-year-old male has a known history of type 1 diabetes mellitus.  He is on
Levemir and NovoLog at home.  He is reported to take Levemir 20 units twice a 
day and large doses of Humalog.  However he is noncompliant on his regimen and 
it is not known exactly how he does take his insulin at home.  His hemoglobin 
A1c tends to run high in the 10-11-1/2 range.  The patient has known diabetic 
neuropathy and retinopathy.
 
 Apparently he stopped taking his insulin after an argument with his wife.  He 
then tried to resume some insulin but began to feel worse and eventually came to
the emergency room.
 
The patient has been treated with normal saline as well as an insulin drip.  Had
acute renal insufficiency with a creatinine 2.5 which is now improving with 
hydration.  In speaking with the patient he states he feels improved.  He is 
hungry and feels he will be able to eat breakfast.
 
Allergies/Medications
Allergies:
Coded Allergies:
Penicillins (ANAPHYLAXIS 17)
aspirin (ANAPHYLAXIS 17)
 
Home Med List:
Albuterol Sulfate (Proair Hfa) 90 MCG HFA.AER.AD   2 PUF INH Q4-6 PRN PRN SOB
Atorvastatin Calcium 20 MG TABLET   1 TAB PO DAILY CHOLESTEROL  (Reported)
Ergocalciferol (Vitamin D2) (Vitamin D2) 50,000 UNIT CAPSULE   1 CAP PO Q2W 
SUPPLEMENT  (Reported)
Insulin Detemir (Levemir Flextouch) 100 UNIT/ML (3 ML) INSULN.PEN   20 UNIT SC 
BID DM  (Reported)
Insulin Detemir (Levemir Flextouch) 100 UNIT/ML (3 ML) INSULN.PEN   16 UNIT SC 
BID DM
Insulin Lispro (Humalog Kwikpen U-100) 100 UNIT/ML INSULN.PEN   0 SC TIDAC DM  (
Reported)
     Based on carbohydrate counting sliding scale
Lisinopril 20 MG TABLET   1 TAB PO DAILY BP  (Reported)
Omeprazole 40 MG CAPSULE.DR   1 CAP PO DAILY GI  (Reported)
Pregabalin (Lyrica) 150 MG CAPSULE   1 CAP PO DAILY NERVE PAIN  (Reported)
 
 
Review of Systems
 
Review of Systems
Constitutional:
Denies: chills, fever. 
Cardiovascular:
Denies: chest pain. 
Respiratory:
Denies: short of breath. 
GI:
Denies: abdominal pain. 
Genitourinary:
Denies: dysuria. 
Skin:
Reports: no symptoms. 
Neurological/Psychological:
Reports: anxiety, depressed. 
Hematologic/Endocrine:
Reports: polyuria, polydipsia.  Denies: bruising. 
 
Past History
 
Travel History
Traveled to [place] past 21 day No
 
Medical History
Blood Transfusion Hx: No
Neurological: NONE
EENT: NONE
Cardiovascular: hypertension, HIGH CHOLESTEROL
Respiratory: NONE
Gastrointestinal: irritable bowel syndrome
Hepatic: NONE
Renal: NONE
Musculoskeletal: NONE
Psychiatric: NONE
Endocrine: diabetes
Blood Disorders: NONE
Cancer(s): NONE
GYN/Reproductive: NONE
 
Surgical History
Surgical History: non-contributory
 
Family History
Relations & Conditions If Any:
FATHER, , Age 50-60; Cause: Cancer.
 
 
Psychosocial History
Where Do You Live? Other ([place])
Who Do You Live With? spouse, child
Services at Home: None
Primary Language: English
Smoking Status: Former Smoker
ETOH Use: occasional use, Social Drinker 
Illicit Drug Use: denies illicit drug use
 
Functional Ability
ADLs
Independent: dressing, eating, toileting, bathing. 
Ambulation: independent
IADLs
Independent: shopping, housework, finances, food prep, telephone, transportation
, medication admin. 
 
Employment History
Employment: Unemployed
 
Exam & Diagnostic Data
Last 24 Hrs of Vital Signs/I&O
 Vital Signs
 
 
Date Time Temp Pulse Resp B/P Pulse O2 O2 Flow FiO2
 
     Ox Delivery Rate 
 
317     97 Room Air Room Air 
 
142 98.2 108 20 131/64 97 Room Air  
 
32 98.3 113 18 121/57 100 Room Air  
 
2248      Room Air  
 
2216 97.5 110 20 144/65 100 Room Air  
 
 
 Intake & Output
 
 
  0800  0000  1600
 
Intake Total 477 2000 
 
Output Total 500 500 
 
Balance -23 1500 
 
    
 
Intake,  2000 
 
Output, Urine 500 500 
 
Patient 225 lb 200 lb 
 
Weight   
 
 
 Vital Signs
 
 
Date Time Temp Pulse Resp B/P Pulse O2 O2 Flow FiO2
 
     Ox Delivery Rate 
 
317     97 Room Air Room Air 
 
142 98.2 108 20 131/64 97 Room Air  
 
2 98.3 113 18 121/57 100 Room Air  
 
2248      Room Air  
 
2216 97.5 110 20 144/65 100 Room Air  
 
 
 Intake & Output
 
 
  0800  0000  1600
 
Intake Total 477 2000 
 
Output Total 500 500 
 
Balance -23 1500 
 
    
 
Intake,  2000 
 
Output, Urine 500 500 
 
Patient 225 lb 200 lb 
 
Weight   
 
 
 
 
Physical Exam
General Appearance: alert, awake, lethargic
Head: normal appearance
Eyes:
Bilateral: normal appearance. 
Neck: normal inspection
Respiratory: normal breath sounds
Cardiovascular: regular rate/rhythm
Gastrointestinal: normal bowel sounds, soft
Extremities: no edema
Skin: intact
Labs/Rayshawn Results:
 Laboratory Tests
 
 
 
 
 0525 0500 0500 0251
 
Blood Gas    
 
  pH (7.35 - 7.45 PH) 7.41   
 
  pCO2 (35 - 45 TORR) 32  L   
 
  pO2 (80 - 100 TORR) 76  L   
 
  HCO3 (21 - 28 MEQ/L) 20  L   
 
  ABG O2 Sat (Measured) (>96.0 %) 95.0  L   
 
  P-50 (Temp Corrected) Y   
 
  Carboxyhemoglobin (1.5 - 5.0 %) 0.7  L   
 
  O2 Concentration % RA   
 
  Temperature (97.0 - 100.0 FARH) 98.8   
 
Chemistry    
 
  Sodium (137 - 145 mmol/L)   143 
 
  Potassium (3.5 - 5.1 mmol/L)   3.9 
 
  Chloride (98 - 107 mmol/L)   108  H 
 
  Carbon Dioxide (22 - 30 mmol/L)   20  L 
 
  Anion Gap (5 - 16)   15 
 
  BUN (9 - 20 mg/dL)   37  H 
 
  Creatinine (0.7 - 1.2 mg/dL)   1.7  H 
 
  Estimated GFR (>60 ml/min)   46  L 
 
  Glucose (65 - 99 mg/dL)   151  H 
 
  Calcium (8.4 - 10.2 mg/dL)   9.3 
 
  Phosphorus (2.5 - 4.5 mg/dL)   2.8 
 
  Magnesium (1.6 - 2.3 mg/dL)   2.3 
 
  Total Bilirubin (0.2 - 1.3 mg/dL)   0.5 
 
  AST (17 - 59 U/L)   17 
 
  ALT (21 - 72 U/L)   50 
 
  Troponin I (<0.11 ng/ml)  < 0.01  
 
  Albumin (3.5 - 5.0 g/dL)   3.7 
 
Miscellaneous    
 
  Phlebotomy Draw Site LEFT RADIAL   
 
Toxicology    
 
  Serum Alcohol    Cancelled
 
 
 
 
 
 
 0251 0155 2340
 
Chemistry   
 
  Sodium (137 - 145 mmol/L)  139 
 
  Potassium (3.5 - 5.1 mmol/L)  4.5 
 
  Chloride (98 - 107 mmol/L)  104 
 
  Carbon Dioxide (22 - 30 mmol/L)  12  L 
 
  Anion Gap (5 - 16)  23  H 
 
  BUN (9 - 20 mg/dL)  39  H 
 
  Creatinine (0.7 - 1.2 mg/dL)  2.0  H 
 
  Estimated GFR (>60 ml/min)  38  L 
 
  BUN/Creatinine Ratio (7 - 25 %)  19.5 
 
Toxicology   
 
  Urine Opiates Screen (>2000 NG/ML)   < 100.00
 
  Methadone Screen (>300 NG/ML) Cancelled  < 40
 
  Barbiturate Screen (>200 NG/ML) Cancelled  < 60
 
  Ur Phencyclidine Scrn (>25 NG/ML) Cancelled  < 6.00
 
  Amphetamines Screen (>1000 NG/ML) Cancelled  < 100
 
  U Benzodiazepines Scrn (>200 NG/ML) Cancelled  < 85
 
  Urine Cocaine Screen (>300 NG/ML) Cancelled  < 50
 
  Urine Cannabis Screen (>50 NG/ML) Cancelled  < 5.00
 
Urines   
 
  Urinalysis   MOD  H
 
  Urine Color (YEL,AMB,STR)   YEL
 
  Urine Clarity (CLEAR)   CLEAR
 
  Urine pH (5.0 - 8.0)   5.5
 
  Ur Specific Gravity (1.001 - 1.035)   1.025
 
  Urine Protein (NEG,<30 MG/DL)   TRACE  H
 
  Urine Ketones (NEG)   40  H
 
  Urine Nitrite (NEG)   NEG
 
  Urine Bilirubin (NEG)   POS@ICTO  H
 
  Urine Urobilinogen (0.1  -  1.0 EU/dl)   0.2
 
  Ur Leukocyte Esterase (NEG)   NEG
 
  Ur Microscopic   SEDIMENT EXAMINED
 
  Urine RBC (0 - 5 /HPF)   1-3
 
  Urine WBC (0 - 2 /HPF)   1-3  H
 
  Ur Epithelial Cells (NONE,FEW)   FEW
 
  Urine Mucus (FEW,NONE)   MOD  H
 
  Urine Hemoglobin (NEG)   NEG
 
  Urine Glucose (N MG/DL)   >=1000  H
 
 
 
 
 
 
 2218 2210
 
Blood Gas  
 
  Bicarbonate Actual (22 - 26 MEQ/L)  16  L
 
  Mixed VBG pH (7.31 - 7.41 PH)  7.34
 
  Mixed VBG pCO2 (41 - 51 TORR)  31  L
 
  Mixed VBG O2 Saturation (35 - 45 TORR)  37
 
  Carboxyhemoglobin (1.5 - 5.0 %)  1.0  L
 
  O2 Concentration %  R/A
 
Chemistry  
 
  Sodium (137 - 145 mmol/L) 134  L 
 
  Potassium (3.5 - 5.1 mmol/L) 5.4  H 
 
  Chloride (98 - 107 mmol/L) 96  L 
 
  Carbon Dioxide (22 - 30 mmol/L) 17  L 
 
  Anion Gap (5 - 16) 21  H 
 
  BUN (9 - 20 mg/dL) 42  H 
 
  Creatinine (0.7 - 1.2 mg/dL) 2.5  H 
 
  Estimated GFR (>60 ml/min) 30  L 
 
  BUN/Creatinine Ratio (7 - 25 %) 16.8 
 
  Glucose (65 - 99 mg/dL) 556 *H 
 
  Lactic Acid (0.7 - 2.1 mmol/L) 1.4 
 
  Calcium (8.4 - 10.2 mg/dL) 9.7 
 
  Total Bilirubin (0.2 - 1.3 mg/dL) 0.8 
 
  AST (17 - 59 U/L) 19 
 
  ALT (21 - 72 U/L) 54 
 
  Alkaline Phosphatase (< 127 U/L) 156  H 
 
  Troponin I (<0.11 ng/ml) < 0.01 
 
  Total Protein (6.3 - 8.2 g/dL) 7.0 
 
  Albumin (3.5 - 5.0 g/dL) 4.2 
 
  Globulin (1.9 - 4.2 gm/dL) 2.8 
 
  Albumin/Globulin Ratio (1.1 - 2.2 %) 1.5 
 
  Amylase (30 - 110 U/L) < 30  L 
 
  Lipase (23 - 300 U/L) 32 
 
Hematology  
 
  CBC w Diff NO MAN DIFF REQ 
 
  WBC (4.8 - 10.8 /CUMM) 9.3 
 
  RBC (4.70 - 6.10 /CUMM) 4.68  L 
 
  Hgb (14.0 - 18.0 G/DL) 13.6  L 
 
  Hct (42 - 52 %) 39.9  L 
 
  MCV (80.0 - 94.0 FL) 85.3 
 
  MCH (27.0 - 31.0 PG) 29.0 
 
  RDW (11.5 - 14.5 %) 13.3 
 
  Plt Count (130 - 400 /CUMM) 402  H 
 
  MPV (7.4 - 10.4 FL) 7.5 
 
  Gran % (42.2 - 75.2 %) 80.2  H 
 
  Lymphocytes % (20.5 - 51.1 %) 14.3  L 
 
  Monocytes % (1.7 - 9.3 %) 5.2 
 
  Eosinophils % (0 - 5 %) 0 
 
  Basophils % (0.0 - 2.0 %) 0.3 
 
  Absolute Granulocytes (1.4 - 6.5 /CUMM) 7.4  H 
 
  Absolute Lymphocytes (1.2 - 3.4 /CUMM) 1.3 
 
  Absolute Monocytes (0.10 - 0.60 /CUMM) 0.5 
 
  Absolute Eosinophils (0.0 - 0.7 /CUMM) 0 
 
  Absolute Basophils (0.0 - 0.2 /CUMM) 0 
 
  PUBS MCHC (33.0 - 37.0 G/DL) 34.0 
 
Miscellaneous  
 
  Phlebotomy Draw Site  VENOUS
 
Toxicology  
 
  Serum Alcohol (<10 MG/DL) < 10.0 
 
  Acetone Level (NEGATIVE) POSITIVE AT 1:16 DIL 
 
 
 
 
Assessment/Plan
Assessment/Plan
The patient's diabetic ketoacidosis is resolved.  His renal function is 
improving with hydration.
 
Suggest that we begin Levemir 20 units twice a day the first dose stat which has
already been given.  In addition we should place him on sliding scale NovoLog 
before meals.  Sliding scale NovoLog 3 times a day before meals should be 
give 4 units NovoLog, 151-200 give 6 units NovoLog, 201-250 give 8 units NovoLog
, 251-300 give 9 units NovoLog, 301-350 give 10 units NovoLog, 351 of 400 give 
11 units NovoLog.  In addition we should write a separate bedtime sliding scale 
NovoLog.  Bedtime sliding scale NovoLog should be less than 250 give no insulin 
251-300 give 2 units NovoLog,  give 3 units NovoLog, 351 of 400 give 4 
units NovoLog.
 
The patient is eating his IV should be changed to normal saline at 1 25 mL/h.  
The insulin drip can be discontinued 1 hour after his dose of NovoLog
 
The patient also has acute renal failure probably due to dehydration.  Even when
he is eating we should continue to hydrate him carefully.  Labs should be 
repeated later in the day.
 
 
Consult Acknowledgment
- Thank you for your consult request.

## 2017-02-14 NOTE — NUR
0300-RECIEVED PT FROM ER. A/OX3, COMPLAINTS OF BACK PAIN. -110'S,
[id number]. RA 97%. INSULIN GTT INFUSING 8 UNITS/HR. IVF CHANGED TO D51/2NS
20KCL AT 150ML/HR. ICE CHIPS PROVIDED. WIFE AT BEDSIDE. PT ORIENTATED TO ICU.

## 2017-02-14 NOTE — NUR
Patient is alert and oriented x's 3- able to follow commands and respond
appropriately. NSR-ST on tele monitor, HR= 's. SBP: 110's and pt denies
chest pain. On room air- lungs clear- O 2 sat 96-98%. Abdomen is soft and non
tender with + bowel sounds. Using the urinal to void. IV zofran given earlier
this morning for interminent c/o nausea- He was able to tolerate breakfast-
insulin gtt was turned off per Dr. Falk's order and NS is now infusing at
125mls/hr. FInger sticks have been ranging from 140-148 and has been checked
every 1 hour. He receives Novolog per the sliding scale. Skin appears intact.
Medicated with po tylenol for c/o a headache earlier this morning and an ice
pack was provided with relief. Vital signs stable- Will continue to closely
monitor patient.

## 2017-02-15 VITALS — DIASTOLIC BLOOD PRESSURE: 88 MMHG | SYSTOLIC BLOOD PRESSURE: 132 MMHG

## 2017-02-15 VITALS — SYSTOLIC BLOOD PRESSURE: 140 MMHG | DIASTOLIC BLOOD PRESSURE: 90 MMHG

## 2017-02-15 LAB
ABSOLUTE GRANULOCYTE CT: 2.9 /CUMM (ref 1.4–6.5)
BASOPHILS # BLD: 0 /CUMM (ref 0–0.2)
BASOPHILS NFR BLD: 0.5 % (ref 0–2)
EOSINOPHIL # BLD: 0.1 /CUMM (ref 0–0.7)
EOSINOPHIL NFR BLD: 2.1 % (ref 0–5)
ERYTHROCYTE [DISTWIDTH] IN BLOOD BY AUTOMATED COUNT: 13.1 % (ref 11.5–14.5)
GRANULOCYTES NFR BLD: 53 % (ref 42.2–75.2)
HCT VFR BLD CALC: 34 % (ref 42–52)
LYMPHOCYTES # BLD: 2.1 /CUMM (ref 1.2–3.4)
MCH RBC QN AUTO: 29.1 PG (ref 27–31)
MCHC RBC AUTO-ENTMCNC: 34.2 G/DL (ref 33–37)
MCV RBC AUTO: 85 FL (ref 80–94)
MONOCYTES # BLD: 0.3 /CUMM (ref 0.1–0.6)
PLATELET # BLD: 261 /CUMM (ref 130–400)
PMV BLD AUTO: 7 FL (ref 7.4–10.4)
RED BLOOD CELL CT: 4 /CUMM (ref 4.7–6.1)
WBC # BLD AUTO: 5.5 /CUMM (ref 4.8–10.8)

## 2017-02-15 NOTE — PN- HOUSESTAFF
KERMIT CULVER,[facility] [place] 02/15/17 0716:
Subjective
Follow-up For:
DKA
CHAZ
 
Subjective:
No acute events overnight. Patient seen and examined this morning. He reports 
feeling well and does not have any complaints. His appetite is improved and his 
oral intake is good. He denies nausea or vomiting.
 
Review of Systems
Constitutional:
Reports: no symptoms. 
 
Objective
Last 24 Hrs of Vital Signs/I&O
 Vital Signs
 
 
Date Time Temp Pulse Resp B/P Pulse O2 O2 Flow FiO2
 
     Ox Delivery Rate 
 
02/15 0800 97.6 88 18 140/90 98 Room Air  
 
02/15 0000 98.0 78 20 132/88 94 Room Air  
 
 1600     97 Room Air  
 
 1600 97.8 98 18 122/60 97 Room Air  
 
 
 Intake & Output
 
 
 02/15 1600 02/15 0800 02/15 0000
 
Intake Total 
 
Output Total   
 
Balance 
 
    
 
Intake, IV  250 1000
 
Intake, Oral 800 300 400
 
Number 4 0 0
 
Bowel   
 
Movements   
 
 
 
Physical Exam
General Appearance: Alert, Oriented X3, No Acute Distress
HEENT: Mucous Membr. moist/pink
Cardiovascular: Regular Rate, Normal S1, Normal S2, No Murmurs, Gallops, Rubs
Lungs: Clear to Auscultation
Abdomen: Soft, No Tenderness, Nondistended, Positive Bowel Sounds
Neurological: No Gross Focal Deficits Noted
Extremities: No Clubbing, No Cyanosis, No Edema
Current Medications:
 Current Medications
 
 
  Sig/Rubina Start time  Last
 
Medication Dose Route Stop Time Status Admin
 
Acetaminophen 1,000 MG Q6P PRN  1300 DCD 
 
N/A 1 UNIT IV   2137
 
Al Hydroxide/Mg  30 ML Q4-6 PRN PRN 02/15 0615 DCD 02/15
 
Hydroxide  PO   0609
 
Al Hydroxide/Mg  30 ML .STK-MED ONE 02/15 0601 DC 
 
Hydroxide  PO 02/15 0602  
 
Albuterol Sulfate 2 PUF Q4-6 PRN PRN  0130 DCD 
 
  INH   
 
Heparin Sodium  5,000 UNIT Q8  1400 DCD 02/15
 
(Porcine)  SC   0609
 
Insulin Aspart 0 AT BEDTIME  2200 DC 
 
  SC   
 
Insulin Aspart 0 AT BEDTIME  2200 DCD 
 
  SC   2134
 
Insulin Aspart 0 TIDAC  1200 DCD 02/15
 
  SC   1141
 
Insulin Detemir 4 UNITS ONCE ONE 02/15 1200 DC 02/15
 
  SC 02/15 1201  1202
 
Insulin Detemir 12 UNITS BID 02/15 1000 DCD 02/15
 
  SC   0947
 
Insulin Detemir 5 UNITS ONCE ONE 0 DC 
 
  SC 
 
Insulin Detemir 20 UNITS BID  1000 DC 
 
  SC   0631
 
Loperamide HCl 2 MG Q6P PRN 02/15 0945 DCD 02/15
 
  PO   1047
 
Ondansetron HCl 4 MG Q6P PRN  0845 DCD 
 
  IV   0840
 
Pregabalin 150 MG DAILY 02/15 1000 DCD 02/15
 
  PO   0948
 
Pregabalin 50 MG .STK-MED ONE 2124 DC 
 
  PO 2125  
 
Pregabalin 150 MG DAILY 2122 DC 
 
  PO   2137
 
Sodium Chloride 1,000 ML Q8H  0945 DC 
 
  IV 02/15 0144  1813
 
 
 
 
Last 24 Hrs of Lab/Rayshawn Results
Last 24 Hrs of Labs/Mics:
 Laboratory Tests
 
02/15/17 0615:
Anion Gap 7, Estimated GFR > 60, BUN/Creatinine Ratio 23.0, CBC w Diff NO MAN 
DIFF REQ, RBC 4.00  L, MCV 85.0, MCH 29.1, RDW 13.1, MPV 7.0  L, Gran % 53.0, 
Lymphocytes % 38.1, Monocytes % 6.3, Eosinophils % 2.1, Basophils % 0.5, 
Absolute Granulocytes 2.9, Absolute Lymphocytes 2.1, Absolute Monocytes 0.3, 
Absolute Eosinophils 0.1, Absolute Basophils 0, PUBS MCHC 34.2
 
BCx (17): NGTD
UCx (17): NGTD
 
Assessment/Plan
Assessment:
35 y/o M with PMHx of T1DM c/b peripheral neuropathy, HTN and depression who is 
admitted for DKA.
 
#DKA: Resolved although blood sugar has been running in the low 300s since last 
night, likely secondary to improved PO intake. His Levemir was reduced to 12 
units SQ BID this AM and plan was to administer another 4 units of Levemir at 
lunch time given consistently elevated blood sugars and observe patient until 
dinner time, however patient was adamant about being discharged ASAP. 
* Will discharge home today on a compassionate basis based on patient's request.
Patient is fully aware of the benefits of continued treatment and risks of 
leaving early. He will follow up with his endocrinologist Dr. Vega after 
discharge.
* Levemir dose decreased to 16 units SQ BID on discharge.
* Continue Novolog SSI TIDAC based on carbohydrate sliding scale on discharge.
 
#CHAZ: Creatinine 2.5 on admission, increased from previous value of 1.1 on . Improved to baseline with IVF hydration, 1.0 this morning. 
* Will resume ypxwr-su-[id number] lisinopril 20 mg PO QD which was held secondary
to CHAZ on discharge.
 
Diet: Diabetic
 
DVT PPx: HSQ and ALPs
 
CODE: FULL
 
Problem List:
 1. Diabetic ketoacidosis
 
 2. Type 1 diabetes mellitus
 
 3. Depression
 
 4. HTN (hypertension)
 
Pain Ratin
Pain Location:
N/A
Pain Goal: Remain pain free
Pain Plan:
Lyrica 150 mg PO QD
Tomorrow's Labs & Rationales:
None - patient will be discharged today
 
Discharge Plan
Discharge Disposition: home
Stable for Discharge? Yes
Anticipated Discharge (Day): today
 
ABDULKADIR CULVER,[facility] 02/15/17 1405:
Objective
Last 24 Hrs of Vital Signs/I&O
 Vital Signs
 
 
Date Time Temp Pulse Resp B/P Pulse O2 O2 Flow FiO2
 
     Ox Delivery Rate 
 
02/15 0800 97.6 88 18 140/90 98 Room Air  
 
02/15 0000 98.0 78 20 132/88 94 Room Air  
 
 1600     97 Room Air  
 
 1600 97.8 98 18 122/60 97 Room Air  
 
 
 Intake & Output
 
 
 02/15 1600 02/15 0800 02/15 0000
 
Intake Total 
 
Output Total   
 
Balance 
 
    
 
Intake, IV  250 1000
 
Intake, Oral 800 300 400
 
Number 4 0 0
 
Bowel   
 
Movements   
 
 
 
 
 
Attending MD Review Statement
 
Attending Statement
Attending MD Statement: examined this patient, discuss w/resident/PA/NP, agreed 
w/resident/PA/NP, discussed with family, reviewed EMR data (avail), discussed 
with nursing, discussed with case mgmt, reviewed images, amended to note
Attending Assessment/Plan:
Full data as above
Patient feels improved.  His labs show that the ketoacidosis has resolved.  The 
patient is apparently eating well.  His insulin requirements seems to be less 
and he was taking at home but this may be because he was often skipping his 
doses of insulin.
 
IMPRESSION 
This is a 36-year-old gentleman with type 1 diabetes who is on Levemir and 
NovoLog at home this seems to be noncompliant with his insulin now here with
 
* Significant DKA which is slowly improving after fluid resuscitation and 
insulin
* Resolved Acute renal failure in a gentleman with type 1 diabetes which is 
slowly improving after he was fluid resuscitated.  Patient was on lisinopril 
which may have made it worse this needs to be restarted slowly once he is better
* History of depression without any suicidal or homicidal ideation needs 
eventual psychiatric evaluation
PLAN
PT wishes to go home at all costs
Fully understands the risks and benefits of continued rx in the hospital but he 
wishes to leave and is willing to follow with Endocrine
COnt all meds
Insulin per endo
Resume out pt meds slowly when stable
pt accepts all risks for being dcd now
He says he is not depressed and will follow with his meds

## 2017-02-15 NOTE — PN- DIABETES
Assessment/Plan
Assessment:
Patient feels improved.  His labs show that the ketoacidosis has resolved.  The 
patient is apparently eating well.  His insulin requirements seems to be less 
and he was taking at home but this may be because he was often skipping his 
doses of insulin.
 
Plan:
Suggest that if the patient is discharged today he be sent home on 12 units of 
Levemir twice a day.  His sugars are currently in a good range on this regimen. 
He can do carbohydrate counting before his meals as previously instructed by Dr. Vega.  He should check his sugars carefully and call the office to make a follow
-up appointment.
 
Subjective
Subjective:
Feels okay
 
Review of Systems
Constitutional:
Denies: chills, fever. 
Cardiovascular:
Denies: chest pain. 
Respiratory:
Denies: short of breath. 
Gastrointestinal:
Denies: abdominal pain. 
Skin:
Reports: no symptoms. 
 
Objective
Last 24 Hrs of Vital Signs/I&O
 Vital Signs
 
 
Date Time Temp Pulse Resp B/P Pulse O2 O2 Flow FiO2
 
     Ox Delivery Rate 
 
02/15 0000 98.0 78 20 132/88 94 Room Air  
 
02/14 1600     97 Room Air  
 
02/14 1600 97.8 98 18 122/60 97 Room Air  
 
02/14 0800 98.0 103 20 118/60 96 Room Air  
 
02/14 0800     96 Room Air  
 
 
 Intake & Output
 
 
 02/15 0800 02/15 0000 02/14 1600
 
Intake Total 550 1400 1730
 
Output Total   
 
Balance 550 1400 1730
 
    
 
Intake,  1000 980
 
Intake, Oral 300 400 750
 
Number 0 0 2
 
Bowel   
 
Movements   
 
 
 Vital Signs
 
 
Date Time Temp Pulse Resp B/P Pulse O2 O2 Flow FiO2
 
     Ox Delivery Rate 
 
02/15 0000 98.0 78 20 132/88 94 Room Air  
 
02/14 1600     97 Room Air  
 
02/14 1600 97.8 98 18 122/60 97 Room Air  
 
02/14 0800 98.0 103 20 118/60 96 Room Air  
 
02/14 0800     96 Room Air  
 
 
 Intake & Output
 
 
 02/15 0800 02/15 0000 02/14 1600
 
Intake Total 550 1400 1730
 
Output Total   
 
Balance 550 1400 1730
 
    
 
Intake,  1000 980
 
Intake, Oral 300 400 750
 
Number 0 0 2
 
Bowel   
 
Movements   
 
 
 
 
Physical Exam
General Appearance: well developed/nourished, no apparent distress, alert, awake
Head: normal appearance
Neck: normal inspection
Respiratory: normal breath sounds
Cardiovascular: regular rate/rhythm
Extremities: normal inspection
Current Medications:
 Current Medications
 
 
  Sig/Rubina Start time  Last
 
Medication Dose Route Stop Time Status Admin
 
Acetaminophen 1,000 MG Q6P PRN 02/14 1300 AC 02/14
 
N/A 1 UNIT IV   2137
 
Acetaminophen 650 MG ONCE ONE 02/14 0815 DC 02/14
 
  PO 02/14 0816  0819
 
Al Hydroxide/Mg  30 ML Q4-6 PRN PRN 02/15 0615 AC 02/15
 
Hydroxide  PO   0609
 
Albuterol Sulfate 2 PUF Q4-6 PRN PRN 02/14 0130 AC 
 
  INH   
 
Baclofen 10 MG TID 02/14 0632 DC 02/14
 
  PO   0819
 
Heparin Sodium  5,000 UNIT Q8 02/14 1400 AC 02/15
 
(Porcine)  SC   0609
 
Ibuprofen 600 MG ONCE ONE 02/14 1445 DC 02/14
 
  PO 02/14 1446  1627
 
Insulin Aspart 0 AT BEDTIME 02/14 2200 DC 
 
  SC   
 
Insulin Aspart 0 AT BEDTIME 02/14 2200 AC 02/14
 
  SC   2134
 
Insulin Aspart 0 Q6 02/14 1200 CAN 
 
  SC   
 
Insulin Aspart 0 TIDAC 02/14 1200 AC 02/14
 
  SC   1636
 
Insulin Aspart 4 UNITS ONCE ONE 02/14 0945 DC 02/14
 
  SC 02/14 0946  0940
 
Insulin Aspart 3 UNITS ONCE ONE 02/14 0930 CAN 
 
  SC 02/14 0931  
 
Insulin Aspart 0 TIDAC 02/14 0800 DC 
 
  SC   
 
Insulin Aspart 4 UNITS ONCE PRN 02/14 0645 DC 
 
  SC   
 
Insulin Detemir 12 UNITS BID 02/15 1000 AC 
 
  SC   
 
Insulin Detemir 5 UNITS ONCE ONE 02/14 2200 DC 02/14
 
  SC 02/14 2201  2135
 
Insulin Detemir 20 UNITS BID 02/14 1000 DC 02/14
 
  SC   0631
 
Insulin Human Regular 100 UNIT Q12H 02/14 0300 DC 
 
Sodium Chloride 100 ML IV 02/14 0835  
 
Ondansetron HCl 4 MG Q6P PRN 02/14 0845 AC 02/14
 
  IV   0840
 
Potassium Chloride 20 MEQ Q8H 02/14 0930 DC 
 
Dextrose/Sodium  1,000 ML IV   
 
Chloride     
 
Potassium Chloride 20 MEQ Q8H 02/14 0245 DC 02/14
 
Dextrose/Sodium  1,000 ML IV   0305
 
Chloride     
 
Pregabalin 150 MG DAILY 02/15 1000 AC 
 
  PO   
 
Pregabalin 50 MG .STK-MED ONE 02/14 2124 DC 
 
  PO 02/14 2125  
 
Pregabalin 150 MG DAILY 02/14 2122 DC 02/14
 
  PO   2137
 
Sodium Chloride 1,000 ML Q8H 02/14 0945 DC 02/14
 
  IV 02/15 0144  1813
 
Sodium Chloride 1,000 ML Q8H 02/14 0830 DC 02/14
 
  IV   0836
 
 
 
 
Findings
Pertinent Lab/Rayshawn Results:
 Laboratory Tests
 
 
 02/15 02/14 02/14
 
 0615 1700 1300
 
Chemistry   
 
  Sodium (137 - 145 mmol/L) 135  L Cancelled Cancelled
 
  Potassium (3.5 - 5.1 mmol/L) 4.0 Cancelled Cancelled
 
  Chloride (98 - 107 mmol/L) 105 Cancelled Cancelled
 
  Carbon Dioxide (22 - 30 mmol/L) 24 Cancelled Cancelled
 
  Anion Gap (5 - 16) 7 Cancelled Cancelled
 
  BUN (9 - 20 mg/dL) 23  H Cancelled Cancelled
 
  Creatinine (0.7 - 1.2 mg/dL) 1.0 Cancelled Cancelled
 
  Estimated GFR (>60 ml/min) > 60  
 
  BUN/Creatinine Ratio (7 - 25 %) 23.0  
 
  Glucose  Cancelled Cancelled
 
  Calcium  Cancelled Cancelled
 
  Phosphorus  Cancelled Cancelled
 
  Magnesium  Cancelled Cancelled
 
  Total Bilirubin  Cancelled Cancelled
 
  AST  Cancelled Cancelled
 
  ALT  Cancelled Cancelled
 
  Albumin  Cancelled Cancelled
 
Hematology   
 
  CBC w Diff NO MAN DIFF REQ  
 
  WBC (4.8 - 10.8 /CUMM) 5.5  
 
  RBC (4.70 - 6.10 /CUMM) 4.00  L  
 
  Hgb (14.0 - 18.0 G/DL) 11.6  L  
 
  Hct (42 - 52 %) 34.0  L  
 
  MCV (80.0 - 94.0 FL) 85.0  
 
  MCH (27.0 - 31.0 PG) 29.1  
 
  RDW (11.5 - 14.5 %) 13.1  
 
  Plt Count (130 - 400 /CUMM) 261  
 
  MPV (7.4 - 10.4 FL) 7.0  L  
 
  Gran % (42.2 - 75.2 %) 53.0  
 
  Lymphocytes % (20.5 - 51.1 %) 38.1  
 
  Monocytes % (1.7 - 9.3 %) 6.3  
 
  Eosinophils % (0 - 5 %) 2.1  
 
  Basophils % (0.0 - 2.0 %) 0.5  
 
  Absolute Granulocytes (1.4 - 6.5 /CUMM) 2.9  
 
  Absolute Lymphocytes (1.2 - 3.4 /CUMM) 2.1  
 
  Absolute Monocytes (0.10 - 0.60 /CUMM) 0.3  
 
  Absolute Eosinophils (0.0 - 0.7 /CUMM) 0.1  
 
  Absolute Basophils (0.0 - 0.2 /CUMM) 0  
 
  PUBS MCHC (33.0 - 37.0 G/DL) 34.2  
 
 
 
 
 02/14
 
 0900
 
Chemistry 
 
  Sodium Cancelled
 
  Potassium Cancelled
 
  Chloride Cancelled
 
  Carbon Dioxide Cancelled
 
  Anion Gap Cancelled
 
  BUN Cancelled
 
  Creatinine Cancelled
 
  Glucose Cancelled
 
  Calcium Cancelled
 
  Phosphorus Cancelled
 
  Magnesium Cancelled
 
  Total Bilirubin Cancelled
 
  AST Cancelled
 
  ALT Cancelled
 
  Albumin Cancelled

## 2017-02-15 NOTE — EVENT NOTE
Event Note
Event Note:
Initial plan was to discharge patient in the morning on 12 units of Levemir 
twice a day, decreased from his fcniz-[id number] dose of 20 units twice a day,
based on Dr. Falk's recommendations. However his blood glucose was 321 before 
breakfast and 306 before lunch. After discussing with Dr. Falk, decision was 
made to give patient 4 more units of Levemir with plans to check his blood 
glucose again at dinner time, at which point he could be discharged on 16 units 
of Levemir twice a day if the glucose had improved. However, patient adamantly 
refused to stay inpatient until dinner time stating that he has urgent business 
to attend to. After discussing with attending Dr. Perez, patient was discharged 
home on a compassionate basis. He is aware of the risks of leaving before the 
recommended time which were explained to him to him in detail and takes full 
responsibility for his decision. He said that he will follow up with Dr. Vega 
after discharge.

## 2017-02-15 NOTE — PATIENT DISCHARGE INSTRUCTIONS
Discharge Instructions
 
General Discharge Information
You were seen/treated for:
Diabetic ketoacidosis
Watch for these problems:
Blood sugar levels that are too high or low
Urinating more often than usual
More thirst than usual
Fruity breath
Fever or chills
Special Instructions:
Please see your primary care physician Dr. Wang within one week of discharge.
Please follow up with your endocrinologist Dr. Vega for diabetes within two 
weeks of discharge.
Check your sugars carefully and take your diabetes medications as directed: 
Levemir 16 units twice daily and Humalog three times daily with meals based on 
carbohydrate counting sliding scale.
 
Diet
Recommended Diet: Diabetic
 
Activity
Full Activity/No Limits: Yes
 
Acute Coronary Syndrome
 
Inclusion Criteria
At DC or during hospital stay patient has or had the following:
ACS DIAGNOSIS No
 
Discharge Core Measures
Meds if any: Prescribed or Continued at Discharge
Meds if any: NOT Prescribed or Continued at Discharge
 
Congestive Heart Failure
 
Inclusion Criteria
At DC or during hospital stay patient has or had the following:
CHF DIAGNOSIS No
 
Discharge Core Measures
Meds if any: Prescribed or Continued at Discharge
Meds if any: NOT Prescribed or Continued at Discharge
 
Cerebrovascular accident
 
Inclusion Criteria
At DC or during hospital stay patient has or had the following:
CVA/TIA Diagnosis No
 
Discharge Core Measures
Meds if any: Prescribed or Continued at Discharge
Meds if any: NOT Prescribed or Continued at Discharge
 
Venous thromboembolism
 
Inclusion Criteria
VTE Diagnosis No
VTE Type NONE
VTE Confirmed by (Test) NONE
 
Discharge Core Measures
- Per Current guidelines, there needs to be overlap
- treatment for the first 5 days of Warfarin therapy.
- If discharged on Warfarin prior to 5 days of
- overlap therapy, the patient will need to be
- assessed for post discharge needs including
- *Post discharge parental anticoagulation
- *Warfarin and/or parental anticoagulation education
- *Follow up date to check INR post discharge
At least 5 days overlap therapy as Inpatient No
Meds if any: Prescribed or Continued at Discharge
Note: Overlap Therapy is Warfarin and Anticoagulant
Meds if any: NOT Prescribed or Continued at Discharge

## 2017-02-15 NOTE — DISCHARGE SUMMARY
Visit Information
 
Visit Dates
Admission Date:
02/14/17
 
Discharge Date:
02/15/17
 
 
Hospital Course
 
Course
Attending Physician:
Alberto Perez MD
 
Primary Care Physician:
MITCHEL WANG MD
 
Consulting Request:
   Consulting Specialty: Endocrinology
   Consulting Physician:
Flynn Falk MD
   Reason for Consult: Diabetic ketoacidosis
Hospital Course:
Mr. Rodriguez is a 35 y/o M with PMHx of T1DM c/b peripheral neuropathy and 
retinopathy, HTN and depression who presented with nausea, abdominal pain, 
generalized weakness, polyuria and polydipsia for 2 days after stopping to take 
his insulin following an argument with his wife. Of note, patient is 
noncompliant with his insulin and his HbA1c runs in the 10-11 range. On initial 
presentation vitals were normal except for tachycardia to 110s. Physical exam 
was remarkable for extremely dry mucous membranes. Patient was AAO x 3 and 
mentating well. Labs were significant for anion gap metabolic acidosis with 
bicarbonate 17 and anion gap 21, blood glucose 556 and creatinine 2.5. Urine was
positive for glucose and acetone. Patient was fluid resuscitated with normal 
saline boluses, started on insulin drip and admitted to the ICU for DKA. Below 
are the issues that were addressed during current admission:
 
#DKA: Endocrinology consult was obtained. Anion gap closed several hours after 
starting insulin drip which was subsequently discontinued. The morning following
admission, patient felt improved and was started on oral diet along with his 
prior to admission Levemir 20 units twice daily as well as sliding scale Novolog
three times daily before meals according to Dr. Falk's recommendations. Patient'
s glucose remained in the 140-180 range, thus he was given only 5 units of 
Levemir at night to avoid hypoglycemia. The following morning, Levemir dose was 
re-adjusted to 12 units twice daily with plans to discharge the patient on this 
dose, however his glucose had increased to 300s, most likely secondary to 
improved oral intake. After discussing with Dr. Falk, decision was made to give 
the patient 4 more units of Levemir with plans to check his sugar again before 
dinner, at which point he could be discharged if glucose had improved. However, 
patient adamantly refused to stay inpatient and was discharged on a 
compassionate basis. He expressed complete understanding of the benefits of 
continued treatment and risks of leaving early and stated that he will follow up
with his endocrinologist Dr. Vega. He was instructed to take 16 units of Levemir
twice daily on discharge and continue his prior to admission Humalog three times
daily with meals based on carbohydrate counting sliding scale.
 
#CHAZ: Patient's creatinine was 2.5 on admission, increased from 1.1 three weeks 
prior (1/24/17). This was felt to be pre-renal in etiology and resolved with IVF
hydration. Creatinine was 1.0 on morning of discharge. Patient's home lisinopril
was held in the setting of CHAZ but he will resume the medication on discharge.
 
#History of depression: Patient denied homicidal or suicidal ideation and 
declined psychiatric evaluation during this admission. He stated that he is not 
depressed and that he will take his medications on discharge.
 
Allergies:
Coded Allergies:
Penicillins (ANAPHYLAXIS 01/24/17)
aspirin (ANAPHYLAXIS 01/24/17)
 
 
Disposition Summary
 
Disposition
Principal Diagnosis:
Diabetic ketoacidosis
Additional Diagnosis:
Acute kidney injury
Discharge Disposition: home or self care
 
Discharge Instructions
 
General Discharge Information
Code Status: Full Code
Patient's Diet:
Diabetic
Patient's Activity:
Full Activity/No Limits
Follow-Up Instructions/Appts:
Please see your primary care physician Dr. Wang within one week of discharge.
Please follow up with your endocrinologist Dr. Vega for diabetes within two 
weeks of discharge.
Check your sugars carefully and take your diabetes medications as directed: 
Levemir 16 units twice daily and Humalog three times daily with meals based on 
carbohydrate counting sliding scale.
 
Medications at Discharge
Discharge Medications:
Continue taking these medications:
Albuterol Sulfate (Proair Hfa) 90 MCG HFA.AER.AD
    2 Puff Inhale through mouth EVERY 4-6 HOURS AS NEEDED as needed for SOB
    Qty = 1
    Comments:
       DID NOT RECEIVE WHILE IN HOSPITAL
 
Omeprazole (Omeprazole) 40 MG CAPSULE.DR
    1 Capsule ORAL DAILY
    Qty = 30
    Comments:
       DID NOT RECEIVE WHILE IN HOSPITAL
 
Lisinopril (Lisinopril) 20 MG TABLET
    1 Tablet ORAL DAILY
    Qty = 30
    Comments:
       DID NOT RECEIVE WHILE IN HOSPITAL
 
Atorvastatin Calcium (Atorvastatin Calcium) 20 MG TABLET
    1 Tablet ORAL DAILY
    Qty = 30
    Comments:
       DID NOT RECEIVE WHILE IN HOSPITAL
 
Insulin Lispro (Humalog Kwikpen U-100) 100 UNIT/ML INSULN.PEN
    0 Inject into fatty tissue 3 TIMES DAILY BEFORE MEALS
    Qty = 45
    Instructions:
       Based on carbohydrate counting sliding scale
    Comments:
       Last Taken: 2/15/17
             Time:
 
Pregabalin (Lyrica) 150 MG CAPSULE
    1 Capsule ORAL DAILY
    Qty = 30
    Comments:
       Last Taken: 2/15/17
             Time: 9:45 AM
 
Ergocalciferol (Vitamin D2) (Vitamin D2) 50,000 UNIT CAPSULE
    1 Capsule ORAL EVERY 2 WEEKS
    Qty = 6
    Comments:
       DID NOT RECEIVE WHILE IN HOSPITAL
 
Insulin Detemir (Levemir Flextouch) 100 UNIT/ML (3 ML) INSULN.PEN
    16 Unit Inject into fatty tissue TWICE DAILY
    Qty = 15
    Comments:
       Last Taken: 2/15/17
             Time: 9:45 AM
    This prescription has been renewed
 
 
Copies To:
FRANNIE CULVER,MITCHEL; ASHLEE CULVER,KARO

## 2017-03-14 ENCOUNTER — HOSPITAL ENCOUNTER (INPATIENT)
Dept: HOSPITAL 68 - ERH | Age: 37
LOS: 1 days | DRG: 638 | End: 2017-03-15
Attending: INTERNAL MEDICINE | Admitting: INTERNAL MEDICINE
Payer: COMMERCIAL

## 2017-03-14 VITALS — BODY MASS INDEX: 33.6 KG/M2 | WEIGHT: 240 LBS | HEIGHT: 71 IN

## 2017-03-14 DIAGNOSIS — E10.42: ICD-10-CM

## 2017-03-14 DIAGNOSIS — E87.5: ICD-10-CM

## 2017-03-14 DIAGNOSIS — K21.9: ICD-10-CM

## 2017-03-14 DIAGNOSIS — E78.5: ICD-10-CM

## 2017-03-14 DIAGNOSIS — Z79.4: ICD-10-CM

## 2017-03-14 DIAGNOSIS — E10.10: Primary | ICD-10-CM

## 2017-03-14 DIAGNOSIS — I10: ICD-10-CM

## 2017-03-14 DIAGNOSIS — K58.9: ICD-10-CM

## 2017-03-14 DIAGNOSIS — E87.1: ICD-10-CM

## 2017-03-14 DIAGNOSIS — Z91.14: ICD-10-CM

## 2017-03-14 LAB
ABSOLUTE GRANULOCYTE CT: 3.3 /CUMM (ref 1.4–6.5)
ABSOLUTE GRANULOCYTE CT: 4.2 /CUMM (ref 1.4–6.5)
BASOPHILS # BLD: 0 /CUMM (ref 0–0.2)
BASOPHILS # BLD: 0 /CUMM (ref 0–0.2)
BASOPHILS NFR BLD: 0.3 % (ref 0–2)
BASOPHILS NFR BLD: 0.5 % (ref 0–2)
EOSINOPHIL # BLD: 0 /CUMM (ref 0–0.7)
EOSINOPHIL # BLD: 0.1 /CUMM (ref 0–0.7)
EOSINOPHIL NFR BLD: 0.7 % (ref 0–5)
EOSINOPHIL NFR BLD: 2.3 % (ref 0–5)
ERYTHROCYTE [DISTWIDTH] IN BLOOD BY AUTOMATED COUNT: 12.9 % (ref 11.5–14.5)
ERYTHROCYTE [DISTWIDTH] IN BLOOD BY AUTOMATED COUNT: 13.1 % (ref 11.5–14.5)
GRANULOCYTES NFR BLD: 56 % (ref 42.2–75.2)
GRANULOCYTES NFR BLD: 70.8 % (ref 42.2–75.2)
HCT VFR BLD CALC: 38.4 % (ref 42–52)
HCT VFR BLD CALC: 39.9 % (ref 42–52)
LYMPHOCYTES # BLD: 1.3 /CUMM (ref 1.2–3.4)
LYMPHOCYTES # BLD: 1.9 /CUMM (ref 1.2–3.4)
MCH RBC QN AUTO: 28.8 PG (ref 27–31)
MCH RBC QN AUTO: 29.2 PG (ref 27–31)
MCHC RBC AUTO-ENTMCNC: 33.8 G/DL (ref 33–37)
MCHC RBC AUTO-ENTMCNC: 34 G/DL (ref 33–37)
MCV RBC AUTO: 84.7 FL (ref 80–94)
MCV RBC AUTO: 86.4 FL (ref 80–94)
MONOCYTES # BLD: 0.4 /CUMM (ref 0.1–0.6)
MONOCYTES # BLD: 0.5 /CUMM (ref 0.1–0.6)
PLATELET # BLD: 312 /CUMM (ref 130–400)
PLATELET # BLD: 392 /CUMM (ref 130–400)
PMV BLD AUTO: 7.2 FL (ref 7.4–10.4)
PMV BLD AUTO: 7.3 FL (ref 7.4–10.4)
RED BLOOD CELL CT: 4.44 /CUMM (ref 4.7–6.1)
RED BLOOD CELL CT: 4.72 /CUMM (ref 4.7–6.1)
WBC # BLD AUTO: 5.9 /CUMM (ref 4.8–10.8)
WBC # BLD AUTO: 6 /CUMM (ref 4.8–10.8)

## 2017-03-14 PROCEDURE — G0480 DRUG TEST DEF 1-7 CLASSES: HCPCS

## 2017-03-14 PROCEDURE — 2NASP: CPT

## 2017-03-14 NOTE — HISTORY & PHYSICAL
ALAYNA VASQUEZRYAN 17 0544:
General Information and HPI
MD Statement:
I have seen and personally examined NEO CAMACHO and documented this H&P.
 
The patient is a 36 year old M who presented with a patient stated chief 
complaint of [DKA].
 
Source of Information: patient, old records
Exam Limitations: no limitations
History of Present Illness:
36-year-old gentleman with past medical history of DKA, hypertension, diabetic 
neuropathy,depression?  Was recently admitted for DKA in February came back in 
handcuffs chief complaint of elevated sugars.
 
Patient reported that he recently increased his Levemir 18 units twice a day and
get 26 units NovoLog with meals?,  However is somewhat noncompliant with his 
medication and forgot to take his pills due to feeling sleepy.  Patient was to 
be evaluated by Dr. Hoskins for JAYCE.  Apparently there was a disturbance earlier 
at night and patient was heavily encrusted in retirement and his sugar or more than 
500.  He reported that probably he didn't take his Levemir the night before.  
Patient does report of polyuria but denies any nausea, vomiting, abdominal pain.
 He does report of left shoulder pain. He denies ang fever,chills.
 
Vital signs on admission were stable,
However sugar was more than 700 and patient at anion gap of 22, hg 130,co2 17, 
na 130, k 5.3
 
EKG pending
 
Allergies/Medications
Allergies:
Coded Allergies:
Penicillins (ANAPHYLAXIS 17)
aspirin (ANAPHYLAXIS 17)
 
Home Med list
Albuterol Sulfate (Proair Hfa) 90 MCG HFA.AER.AD   2 PUF INH Q4-6 PRN PRN SOB
Atorvastatin Calcium 20 MG TABLET   1 TAB PO DAILY CHOLESTEROL  (Reported)
Ergocalciferol (Vitamin D2) (Vitamin D2) 50,000 UNIT CAPSULE   1 CAP PO Q2W 
SUPPLEMENT  (Reported)
Insulin Detemir (Levemir Flextouch) 100 UNIT/ML (3 ML) INSULN.PEN   20 UNIT SC 
BID DM  (Reported)
Insulin Detemir (Levemir Flextouch) 100 UNIT/ML (3 ML) INSULN.PEN   16 UNIT SC 
BID DM
Insulin Lispro (Humalog Kwikpen U-100) 100 UNIT/ML INSULN.PEN   0 SC TIDAC DM  (
Reported)
     Based on carbohydrate counting sliding scale
Lisinopril 20 MG TABLET   1 TAB PO DAILY BP  (Reported)
Omeprazole 40 MG CAPSULE.DR   1 CAP PO DAILY GI  (Reported)
Pregabalin (Lyrica) 150 MG CAPSULE   1 CAP PO DAILY NERVE PAIN  (Reported)
 
 
Past History
 
Travel History
Traveled to [place] past 21 day No
 
Medical History
Neurological: NONE
EENT: NONE
Cardiovascular: hypertension, hyperlipidemia
Respiratory: NONE
Gastrointestinal: irritable bowel syndrome
Hepatic: NONE
Renal: NONE
Musculoskeletal: NONE
Psychiatric: depression
Endocrine: diabetes
Blood Disorders: NONE
Cancer(s): NONE
GYN/Reproductive: NONE
History of MRSA: No
History of VRE: No
History of CDIFF: No
 
Surgical History
Surgical History: non-contributory
 
Past Family/Social History
 
Family History
Relations & Conditions if any
FATHER, , Age 50-60; Cause: Cancer.
 
 
Psychosocial History
Who Do You Live With? spouse, child
Services at Home: None
Primary Language: English
 
Functional Ability
ADLs
Independent: dressing, eating, toileting, bathing. 
Ambulation: independent
IADLs
Independent: shopping, housework, finances, food prep, telephone, transportation
, medication admin. 
 
Review of Systems
 
Review of Systems
Constitutional:
Reports: see HPI. 
 
Exam & Diagnostic Data
Last 24 Hrs of Vital Signs/I&O
 Vital Signs
 
 
Date Time Temp Pulse Resp B/P Pulse O2 O2 Flow FiO2
 
     Ox Delivery Rate 
 
 0257 96.8 89 18 122/69 98 Room Air  
 
 
 Intake & Output
 
 
  0800  0000  1600
 
Intake Total 0  
 
Output Total   
 
Balance 0  
 
    
 
Intake, Oral 0  
 
Patient 185 lb  
 
Weight   
 
 
 
 
Physical Exam
General Appearance Alert, Oriented X3, Cooperative, No Acute Distress
Skin there is an ulcer in the rigth side of the neck according the patient due 
to trauma on while moving the bed
Cardiovascular Regular Rate, No Murmurs
Lungs Clear to Auscultation, Normal Air Movement
Abdomen Normal Bowel Sounds, Soft, No Tenderness, No Hepatospenomegaly, No 
Masses
Extremities No Clubbing, No Cyanosis, No Edema, Normal Pulses, No Tenderness/
Swelling
Last 24 Hrs of Labs/Rayshawn:
 Laboratory Tests
 
17 0335:
Anion Gap 22  H, Estimated GFR > 60, BUN/Creatinine Ratio 21.7, Glucose 781 *H, 
Calcium 10.0, Total Bilirubin 1.2, AST 33, ALT 67, Alkaline Phosphatase 147  H, 
Troponin I Pending, Total Protein 6.6, Albumin 4.1, Globulin 2.5, Albumin/
Globulin Ratio 1.6, Amylase Pending, Lipase Pending, CBC w Diff NO MAN DIFF REQ,
RBC 4.44  L, MCV 86.4, MCH 29.2, RDW 12.9, MPV 7.3  L, Gran % 70.8, Lymphocytes 
% 22.3, Monocytes % 5.9, Eosinophils % 0.7, Basophils % 0.3, Absolute 
Granulocytes 4.2, Absolute Lymphocytes 1.3, Absolute Monocytes 0.4, Absolute 
Eosinophils 0, Absolute Basophils 0, PUBS MCHC 33.8, Serum Alcohol < 10.0, 
Acetone Level POSITIVE AT 1:32 DIL
 
17 0329:
Serum Alcohol Cancelled
 
 
Assessment/Plan
Assessment:
36-year-old gentleman with past medical history of diabetes came with elevated 
sugar and DKA picture.
 
Vital signs on admission were stable,
However sugar was more than 700 and patient at anion gap of 22, hg 130,co2 17, 
na 130, k 5.3
 
EKG pending
 
 
Assessment and plan
 
#DKA
-Admit to ICU
-3 L bolus normal saline
-check Blood sugars every hour
-After bolus 150 mL per hour normal saline
- BEP every 4 hours
-continue insulin drip till the gap closes and HCO3 is more than 19
-Attempted to call endocrinology service twice, however it was apparentlywrong 
service, please call ENDO in the morning.
-CXR
-EKG troponin x 2
-Check amylase and lipase
-Check urine tox
 
#HTN
-hold lisinopril for now
 
#DM neuropathy
-Continue Lyrica 75 mg, per patient it's 150 daily. we can increase gradually
 
#GERD
-continue omeprazole
 
#Cysts in the back
-non painful, patient is supposed to have an appointment with Kavin Gerber MD portable removal of the cyst
-Kavin Gerber MD in the morning for consult(attempted call went into 
voicemail)
 
NPO for now, DVT prophylaxis is subcutaneous heparin and Alps, full code, 
Tylenol for pain
 
 
Core Measures/Miscellaneous
 
Acute Coronary Syndrome
ACS Diagnosis: No
 
Cerebrovascular Accident
CVA/TIA Diagnosis: No
 
Congestive Heart Failure
CHF Diagnosis: No
 
Venous Thromboembolism
VTE Risk Factors: Acute medical illness
No Mech VTE prophylaxis d/t: VTE low risk
No VTE Pharm Prophylaxis d/t: VTE low risk
VTE Diagnosis: No
VTE Type: NONE
VTE Confirmed by (Test): NONE
 
Severe Sepsis
Severe Sepsis Present: No
 
Septic Shock
Septic Shock Present: No
 
Miscellaneous Documentation
Attending Case Discussed With:
ELISEO HOSKINS MD
 
Primary Care Physician:
MITCHEL KATHLEEN MD
 
Patient sees these Specialists
dr lara
Level of Patient Care: Critical Care (CRI)
 
ELISEO HOSKINS MD 17 0559:
Assessment/Plan
 
As Ranked By This Provider
Problem List:
 1. DKA (diabetic ketoacidoses)
 
 
Attending MD Review Statement
 
Attending Statement
Attending MD Statement: examined this patient, discuss w/resident/PA/NP, agreed 
w/resident/PA/NP, discussed with family, reviewed EMR data (avail), discussed 
with nursing, discussed with case mgmt, reviewed images, amended to note
Attending Assessment/Plan:
Eliseo BOYCE M.D. have examined this patient, reviewed available EMR data, 
personally reviewed images, discussed with resident/PA/NP, discussed management 
plan with housestaff and nursing staff, discussed managment plan all of 
healthcare providers, discussed management plan with patient and/or family, 
agreed with resident/PA/NP.
The past history and parts of the chart have been autopopulated.
 
Impression
36 year old man
 
- admitted to ICU for DKA
- currently in custody for a familial dispute
- hx of snoring
- cyst on back
 
Plan
- Admitted to ICU for insulin drip and hydration
- endocrinology consultation
- monitor labs, mag, phos, k
- non-urgent consultation with Dr. Gerber for cyst on back, pt was to have 
an appointment this Wednesday
- pt is in custody
- pt has not shown up for his recommended sleep testing, can be pursued on an 
outpatient basis if he agrees
- obtain baseline amylase, lipase, CXR
- DVT prophylaxis at all times
 
TTS 40 min

## 2017-03-14 NOTE — ED GENERAL ADULT
History of Present Illness
 
General
Chief Complaint: General Adult
Stated Complaint: HYPERGLYCEMIA
Source: patient, EMS, police
Exam Limitations: no limitations
 
Vital Signs & Intake/Output
Vital Signs & Intake/Output
 Vital Signs
 
 
Date Time Temp Pulse Resp B/P Pulse O2 O2 Flow FiO2
 
     Ox Delivery Rate 
 
 1146 97.5 94 18 125/78 97 Room Air  
 
 0549 96.2 91 18 123/69 97 Room Air  
 
 0257 96.8 89 18 122/69 98 Room Air  
 
 
Allergies
Coded Allergies:
Penicillins (ANAPHYLAXIS 17)
aspirin (ANAPHYLAXIS 17)
 
Triage Nurses Notes Reviewed? yes
Onset: Abrupt
Duration: hour(s): (FEW)
Timing: multiple episodes today
Injury Environment: nursing home
Severity: moderate
No Modifying Factors: none
HPI:
36 year old male with history of IDDM presents from custodial under arrest for 
reported fingerstick over 500.  Last took meds this morning.  He states that he 
felt his mouth was dry and was urinating a lot and knew his sugar was elevated. 
No abdominal pain, nausea or vomiting.  Recent history of admission to the 
hospital for DKA.  He takes levemir usually BID 18 units and gives himself 
coverage throughout the day. 
(ENRIQUE CULVER,VIANCA)
Reconcile Medications
Albuterol Sulfate (Proair Hfa) 90 MCG HFA.AER.AD   2 PUF INH Q4-6 PRN PRN SOB
Atorvastatin Calcium 20 MG TABLET   1 TAB PO DAILY CHOLESTEROL  (Reported)
Ergocalciferol (Vitamin D2) (Vitamin D2) 50,000 UNIT CAPSULE   1 CAP PO Q2W 
SUPPLEMENT  (Reported)
Insulin Detemir (Levemir Flextouch) 100 UNIT/ML (3 ML) INSULN.PEN   15 UNIT SC 
BID DM
     if you skip a meal Levemir will be decreased to 10 units twice a
     day at this day
Insulin Lispro (Humalog Kwikpen U-100) 100 UNIT/ML INSULN.PEN   0 SC TIDAC DM  (
Reported)
     please take one units of Humalog for each 5 g of carbohydrate
     He states that dose
      prior to each meal
Lisinopril 20 MG TABLET   1 TAB PO DAILY BP  (Reported)
Omeprazole 40 MG CAPSULE.DR   1 CAP PO DAILY GI  (Reported)
Pregabalin (Lyrica) 150 MG CAPSULE   1 CAP PO DAILY NERVE PAIN  (Reported)
 
(LISA CULVER,VINAY)
 
Past History
 
Travel History
Traveled to [place] past 21 day No
 
Medical History
Any Pertinent Medical History? see below for history
Neurological: NONE
EENT: NONE
Cardiovascular: hypertension, hyperlipidemia
Respiratory: NONE
Gastrointestinal: irritable bowel syndrome
Hepatic: NONE
Renal: NONE
Musculoskeletal: NONE
Psychiatric: depression
Endocrine: diabetes
Blood Disorders: NONE
Cancer(s): NONE
GYN/Reproductive: NONE
History of MRSA: No
History of VRE: No
History of CDIFF: No
 
Surgical History
Surgical History: non-contributory
 
Psychosocial History
Who do you live with Spouse
Services at Home None
What is your primary language English
 
Family History
Family History, If Any:
FATHER, , Age 50-60; Cause: Cancer.
 
Hx Contributory? No
(ENRIQUE CULVER,VIANCA)
 
Review of Systems
 
Review of Systems
Constitutional:
Denies: chills, fever. 
EENTM:
Reports: no symptoms. 
Respiratory:
Denies: cough, short of breath. 
Cardiovascular:
Denies: chest pain. 
GI:
Denies: abdominal pain, nausea, vomiting. 
Genitourinary:
Reports: no symptoms. 
Musculoskeletal:
Reports: no symptoms. 
Skin:
Reports: no symptoms. 
Neurological/Psychological:
Denies: confusion. 
Hematologic/Endocrine:
Reports: polyuria, polydipsia.  Denies: bruising, bleeding. 
Immunologic/Allergic:
Denies: splenectomy. 
All Other Systems: Reviewed and Negative
(VIANCA NUNEZ MD)
 
Physical Exam
 
Physical Exam
General Appearance: alert, awake, mild distress, thin
Head: atraumatic, normal appearance
Eyes:
Bilateral: PERRL, EOMI. 
Ears, Nose, Throat: DRY MUCUS MEMBRANES
Neck: normal inspection, supple, full range of motion
Respiratory: normal breath sounds, chest non-tender, no respiratory distress
Cardiovascular: regular rate/rhythm
Peripheral Pulses:
2+ radial (R), 2+ radial (L)
Gastrointestinal: normal bowel sounds, soft, non-tender
Extremities: normal inspection, normal capillary refill, normal range of motion,
no edema
Neurologic/Psych: no motor/sensory deficits, awake, alert, oriented x 3
Skin: intact, normal color, warm/dry
 
Core Measures
ACS in differential dx? No
CVA/TIA Diagnosis: No
Severe Sepsis Present: No
Septic Shock Present: No
(ENRIQUE CULVER,VIANCA)
 
Progress
Differential Diagnoses
I considered the following diagnoses in my evaluation of the patient: [
HYPERGLYCEMIA, MEDICATION NON COMPLIANCE, DKA, MEDICATION NON COMPLIANCE
 
Plan of Care:
 Orders
 
 
Procedure Date/time Status
 
Consistent Carbohydrate 2  L Active
 
Nothing by Mouth  B Complete
 
EKG  1130 Active
 
Restraint- Behavioral (Order)  1125 Active
 
TROPONIN LEVEL  0936 Complete
 
ICU LAB BUNDLE  0936 Complete
 
CBC WITHOUT DIFFERENTIAL  0936 Complete
 
PSYCHIATRIC CONSULT  0840 Active
 
BASIC ELECTROLYTES PLUS BUN&CR  0730 Active
 
Lab Add-on Test  0703 Active
 
Lab Add-on Test  0659 Active
 
Pathway - chart  0544 Active
 
House Staff  0544 Active
 
Code Status  0544 Active
 
URINE DRUGS OF ABUSE  0530 Complete
 
URINALYSIS  0530 Complete
 
FingerStick- Glucose  0529 Active
 
Patient Data  0501 Active
 
EKG  0444 Active
 
Admit to inpatient  0443 Active
 
Vital Signs  0443 Active
 
Code Status  0443 Complete
 
TROPONIN LEVEL  0335 Active
 
LIPASE  0335 Active
 
GLYCOSYLATED HGB  0335 Active
 
ETHANOL  0335 Active
 
AMYLASE  0335 Active
 
COMPREHENSIVE METABOLIC PANEL  0316 Active
 
CBC WITHOUT DIFFERENTIAL  0316 Complete
 
ACETONE  0316 Active
 
Intake & Output  0311 Active
 
FingerStick- Glucose  0306 Active
 
Transfer patient to   UNK Active
 
Lab Add-on Test   UNK Active
 
VTE Mechanical Prophylaxis   UNK Active
 
 
 Current Medications
 
 
  Sig/Rubina Start time  Last
 
Medication Dose  Stop Time Status Admin
 
Insulin Detemir 15 UNITS BID  2200 AC 
 
(Levemir)     
 
Atorvastatin Calcium 20 MG 1700  1700 AC 
 
(Lipitor)     
 
Insulin Aspart 0 Q4  1400 CAN 
 
(NovoLOG)     
 
Insulin Aspart 0 TIDAC/HS  1245 AC 
 
(NovoLOG)     
 
Benztropine Mesylate 1 MG ONCE ONE  1115 CAN 
 
(Cogentin 1 MG     1116  
 
Tablet)     
 
Lorazepam 2 MG ONE TIME ONE  1115 CAN 
 
(Ativan)    1116  
 
Pregabalin 75 MG DAILY  1000 AC 
 
(Lyrica)     
 
Omeprazole 40 MG DAILY AC  0700 AC 
 
(Prilosec)     
 
Heparin Sodium  5,000 UNIT Q8  0600 AC 
 
(Porcine)     
 
Acetaminophen 650 MG Q6P PRN  0545 AC 
 
(Tylenol)     
 
 
 Laboratory Tests
 
 
 
17 1140:
Anion Gap 13, Estimated GFR > 60, Glucose 136  H, Calcium 10.1, Phosphorus 3.9, 
Magnesium 2.2, Total Bilirubin 1.0, AST 33, ALT 66, Troponin I < 0.01, Albumin 
4.6, CBC w Diff NO MAN DIFF REQ, RBC 4.72, MCV 84.7, MCH 28.8, RDW 13.1, MPV 7.2
 L, Gran % 56.0, Lymphocytes % 32.5, Monocytes % 8.7, Eosinophils % 2.3, 
Basophils % 0.5, Absolute Granulocytes 3.3, Absolute Lymphocytes 1.9, Absolute 
Monocytes 0.5, Absolute Eosinophils 0.1, Absolute Basophils 0, PUBS MCHC 34.0
 
17 1138:
Urine Opiates Screen < 100.00, Methadone Screen < 40, Barbiturate Screen < 60, 
Ur Phencyclidine Scrn < 6.00, Amphetamines Screen < 100, U Benzodiazepines Scrn 
< 85, Urine Cocaine Screen < 50, Urine Cannabis Screen < 5.00, Urine Color YEL, 
Urine Clarity CLEAR, Urine pH 6.0, Ur Specific Gravity 1.020, Urine Protein NEG,
Urine Ketones 40  H, Urine Nitrite NEG, Urine Bilirubin NEG, Urine Urobilinogen 
0.2, Ur Leukocyte Esterase NEG, Ur Microscopic EXAM NOT REQUIRED, Urine 
Hemoglobin NEG, Urine Glucose >=1000  H
 
17 1130:
Magnesium Cancelled, CBC w Diff Cancelled, WBC Cancelled, RBC Cancelled, Hgb 
Cancelled, Hct Cancelled, MCV Cancelled, MCH Cancelled, RDW Cancelled, Plt Count
Cancelled, MPV Cancelled, PUBS MCHC Cancelled
 
17 0335:
Anion Gap 22  H, Estimated GFR > 60, BUN/Creatinine Ratio 21.7, Glucose 781 *H, 
Hemoglobin A1c Pending, Calcium 10.0, Total Bilirubin 1.2, AST 33, ALT 67, 
Alkaline Phosphatase 147  H, Troponin I < 0.01, Total Protein 6.6, Albumin 4.1, 
Globulin 2.5, Albumin/Globulin Ratio 1.6, Amylase 37, Lipase 87, CBC w Diff NO 
MAN DIFF REQ, RBC 4.44  L, MCV 86.4, MCH 29.2, RDW 12.9, MPV 7.3  L, Gran % 70.8
, Lymphocytes % 22.3, Monocytes % 5.9, Eosinophils % 0.7, Basophils % 0.3, 
Absolute Granulocytes 4.2, Absolute Lymphocytes 1.3, Absolute Monocytes 0.4, 
Absolute Eosinophils 0, Absolute Basophils 0, PUBS MCHC 33.8, Serum Alcohol < 
10.0, Acetone Level POSITIVE AT 1:32 DIL
 
17 0329:
Serum Alcohol Cancelled
Initial ED EKG: NSR
Rhythm Strip: normal sinus rhythm
(VIANCA NUNEZ MD)
Differential Diagnoses
I considered the following diagnoses in my evaluation of the patient: 
 
Comments:
Patient requests AMA.
Psychiatry competency evaluation completed and initially allowed to leave AMA.
Decision overturned.  PEC completed.
Refuses to take psychiatric medications and continues to threaten staff wanting 
to leave.  IM sedation ordered.
(VINAY GONZALEZ MD)
 
Departure
 
Departure
Time of Disposition: 443
Disposition: STILL A PATIENT
Condition: Stable
Clinical Impression
Primary Impression: Hyperglycemia
Referrals:
MITCHEL KATHLEEN MD (PCP/Family)
 
Departure Forms:
Customer Survey
General Discharge Information
 
Admission Note
Spoke With:
EBONIE HOSKINS MD
Documentation of Exam:
Documentation of any treatments & extenuating circumstances including Concerns 
Regarding Discharge (functional status, medication knowledge or non-compliance, 
living conditions, etc.) that warrant an admission rather than observation: [ICU
MONITOR, INSULIN DRIP, MONITOR I/O, ENDOCRINOLOGY CONSULTATION, MONITOR 
ELECTROLYTES]
 
(VIANCA NUNEZ MD)
 
Departure
Prescriptions:
Current Visit Scripts
Insulin Detemir (Levemir Flextouch) 15 UNIT SC BID 
     #30 
     if you skip a meal Levemir will be decreased to 10 units twice a
     day at this day
 
 
(VINAY GONZALEZ MD)
 
Critical Care Note
 
Critical Care Note
Critical Care Time: 30-74 min
(VIANCA NUNEZ MD)

## 2017-03-14 NOTE — ADMISSION CERTIFICATION
Admission Certification
 
Certification Statement
- As attending physician, I certify that at the time of
- admission, based on clinical presentation, severity of
- symptoms, need for further diagnostic testing and
- therapeutic interventions, and risk of adverse outcomes
- without in-hospital treatment, in my clinical assessment,
- this patient requires an acute hospital stay for a minimum
- of two nights or longer. I have also considered psychsocial
- factors such as support system, advanced age, financial
- issues, cognitive issues, and failed out-patient treatments,
- past re-admission history, safety of patient, and lack of
- compliance as applicable.
Specific rationale supporting this admission is:
icu admission
dka
inuslin drip
hydration
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
## 2017-03-14 NOTE — RADIOLOGY REPORT
EXAMINATION:
CHEST 1 VIEW
 
CLINICAL INFORMATION:
Shortness of breath.
 
COMPARISON:
11/12/2010.
 
TECHNIQUE:
An AP view of the chest is provided.
 
FINDINGS:
The cardiac silhouette is not enlarged. The mediastinal and hilar contours
are unremarkable. There are neither pleural effusions nor pneumothoraces.
There are no consolidations. The osseous structures are unremarkable.
 
IMPRESSION:
No evidence for acute disease.

## 2017-03-14 NOTE — CONS- ENDOCRINOLOGY
General Information and HPI
 
Consulting Request
Date of Consult: 17
Requested By: ICU
Reason for Consult:
evaluation and management of hyperglycemia and mild ketosis.
Source of Information: patient, old records
Exam Limitations: no limitations
History of Present Illness:
37 y/o male who has a history of  DM type 1 on insulin ( Levemir 16-20 units 
twice a day and Novolog before meals according to carbohydrates counting), was 
arrested yesterday due to an old restraint court order, missed Levemir dose last
night, but he received Levemir 18 units at around 3 am, was brought to the ED. 
 
He was admitted under ICU service initially for DKA with initial glucose level 
of 781, AG 22, bicarb 17, acetone positive at 1:32, K 5.3, sodium 130 and Cr 1.2
. 
He was on IVF and insulin drip. However, patient would like to sign AMA and to 
leave hospital. He disconnected IV lines on his own. 
 
When I saw him in ER,  his FSG was 129. Levemir 10 units x one time was given as
he was already off on iv insulin drip. He reported that he was hungry and he 
would like to eat. Repeat BMP was done stat.
 
 
 
 
Allergies/Medications
Allergies:
Coded Allergies:
Penicillins (ANAPHYLAXIS 17)
aspirin (ANAPHYLAXIS 17)
 
Home Med List:
Albuterol Sulfate (Proair Hfa) 90 MCG HFA.AER.AD   2 PUF INH Q4-6 PRN PRN SOB
Atorvastatin Calcium 20 MG TABLET   1 TAB PO DAILY CHOLESTEROL  (Reported)
Ergocalciferol (Vitamin D2) (Vitamin D2) 50,000 UNIT CAPSULE   1 CAP PO Q2W 
SUPPLEMENT  (Reported)
Insulin Detemir (Levemir Flextouch) 100 UNIT/ML (3 ML) INSULN.PEN   20 UNIT SC 
BID DM  (Reported)
Insulin Detemir (Levemir Flextouch) 100 UNIT/ML (3 ML) INSULN.PEN   16 UNIT SC 
BID DM
Insulin Lispro (Humalog Kwikpen U-100) 100 UNIT/ML INSULN.PEN   0 SC TIDAC DM  (
Reported)
     Based on carbohydrate counting sliding scale
Lisinopril 20 MG TABLET   1 TAB PO DAILY BP  (Reported)
Omeprazole 40 MG CAPSULE.DR   1 CAP PO DAILY GI  (Reported)
Pregabalin (Lyrica) 150 MG CAPSULE   1 CAP PO DAILY NERVE PAIN  (Reported)
 
 
Review of Systems
 
Review of Systems
Constitutional:
Reports: see HPI. 
Cardiovascular:
Denies: chest pain. 
Respiratory:
Denies: short of breath. 
GI:
Denies: abdominal pain. 
Neurological/Psychological:
Reports: see HPI (patient is agitated.). 
 
Past History
 
Travel History
Traveled to [place] past 21 day No
 
Medical History
Neurological: NONE
EENT: NONE
Cardiovascular: hypertension, hyperlipidemia
Respiratory: NONE
Gastrointestinal: irritable bowel syndrome
Hepatic: NONE
Renal: NONE
Musculoskeletal: NONE
Psychiatric: anxiety, depression, ADHD, probable intermittent explosive disorder
, remote history of alcohol use disorder, history of cannabis use disorder,
Endocrine: DM type 1
Blood Disorders: NONE
Cancer(s): NONE
GYN/Reproductive: NONE
 
Surgical History
Surgical History: non-contributory
 
Family History
Relations & Conditions If Any:
FATHER, , Age 50-60; Cause: Cancer.
 
 
Psychosocial History
Who Do You Live With? spouse, child
Services at Home: None
Primary Language: English
 
Functional Ability
ADLs
Independent: dressing, eating, toileting, bathing. 
Ambulation: independent
IADLs
Independent: shopping, housework, finances, food prep, telephone, transportation
, medication admin. 
 
Exam & Diagnostic Data
Last 24 Hrs of Vital Signs/I&O
 Vital Signs
 
 
Date Time Temp Pulse Resp B/P Pulse O2 O2 Flow FiO2
 
     Ox Delivery Rate 
 
 1146 97.5 94 18 125/78 97 Room Air  
 
 0549 96.2 91 18 123/69 97 Room Air  
 
 0257 96.8 89 18 122/69 98 Room Air  
 
 
 Intake & Output
 
 
  1600  0800  0000
 
Intake Total  0 
 
Output Total   
 
Balance  0 
 
    
 
Intake, Oral  0 
 
Patient  185 lb 
 
Weight   
 
 
 
 
Physical Exam
General Appearance: agitated
Labs/Rayshawn Results:
 Laboratory Tests
 
 
 
 
 1140 1138
 
Chemistry  
 
  Sodium (137 - 145 mmol/L) 140 
 
  Potassium (3.5 - 5.1 mmol/L) 4.3 
 
  Chloride (98 - 107 mmol/L) 102 
 
  Carbon Dioxide (22 - 30 mmol/L) 25 
 
  Anion Gap (5 - 16) 13 
 
  BUN (9 - 20 mg/dL) 19 
 
  Creatinine (0.7 - 1.2 mg/dL) 0.9 
 
  Estimated GFR (>60 ml/min) > 60 
 
  Glucose (65 - 99 mg/dL) 136  H 
 
  Calcium (8.4 - 10.2 mg/dL) 10.1 
 
  Phosphorus (2.5 - 4.5 mg/dL) 3.9 
 
  Magnesium (1.6 - 2.3 mg/dL) 2.2 
 
  Total Bilirubin (0.2 - 1.3 mg/dL) 1.0 
 
  AST (17 - 59 U/L) 33 
 
  ALT (21 - 72 U/L) 66 
 
  Troponin I (<0.11 ng/ml) < 0.01 
 
  Albumin (3.5 - 5.0 g/dL) 4.6 
 
Hematology  
 
  CBC w Diff NO MAN DIFF REQ 
 
  WBC (4.8 - 10.8 /CUMM) 5.9 
 
  RBC (4.70 - 6.10 /CUMM) 4.72 
 
  Hgb (14.0 - 18.0 G/DL) 13.6  L 
 
  Hct (42 - 52 %) 39.9  L 
 
  MCV (80.0 - 94.0 FL) 84.7 
 
  MCH (27.0 - 31.0 PG) 28.8 
 
  RDW (11.5 - 14.5 %) 13.1 
 
  Plt Count (130 - 400 /CUMM) 392 
 
  MPV (7.4 - 10.4 FL) 7.2  L 
 
  Gran % (42.2 - 75.2 %) 56.0 
 
  Lymphocytes % (20.5 - 51.1 %) 32.5 
 
  Monocytes % (1.7 - 9.3 %) 8.7 
 
  Eosinophils % (0 - 5 %) 2.3 
 
  Basophils % (0.0 - 2.0 %) 0.5 
 
  Absolute Granulocytes (1.4 - 6.5 /CUMM) 3.3 
 
  Absolute Lymphocytes (1.2 - 3.4 /CUMM) 1.9 
 
  Absolute Monocytes (0.10 - 0.60 /CUMM) 0.5 
 
  Absolute Eosinophils (0.0 - 0.7 /CUMM) 0.1 
 
  Absolute Basophils (0.0 - 0.2 /CUMM) 0 
 
  PUBS MCHC (33.0 - 37.0 G/DL) 34.0 
 
Toxicology  
 
  Urine Opiates Screen (>2000 NG/ML)  < 100.00
 
  Methadone Screen (>300 NG/ML)  < 40
 
  Barbiturate Screen (>200 NG/ML)  < 60
 
  Ur Phencyclidine Scrn (>25 NG/ML)  < 6.00
 
  Amphetamines Screen (>1000 NG/ML)  < 100
 
  U Benzodiazepines Scrn (>200 NG/ML)  < 85
 
  Urine Cocaine Screen (>300 NG/ML)  < 50
 
  Urine Cannabis Screen (>50 NG/ML)  < 5.00
 
Urines  
 
  Urine Color (YEL,AMB,STR)  YEL
 
  Urine Clarity (CLEAR)  CLEAR
 
  Urine pH (5.0 - 8.0)  6.0
 
  Ur Specific Gravity (1.001 - 1.035)  1.020
 
  Urine Protein (NEG,<30 MG/DL)  NEG
 
  Urine Ketones (NEG)  40  H
 
  Urine Nitrite (NEG)  NEG
 
  Urine Bilirubin (NEG)  NEG
 
  Urine Urobilinogen (0.1  -  1.0 EU/dl)  0.2
 
  Ur Leukocyte Esterase (NEG)  NEG
 
  Ur Microscopic  EXAM NOT REQUIRED
 
  Urine Hemoglobin (NEG)  NEG
 
  Urine Glucose (N MG/DL)  >=1000  H
 
 
 
 
 
 
 1130 0335
 
Chemistry  
 
  Sodium (137 - 145 mmol/L)  130  L
 
  Potassium (3.5 - 5.1 mmol/L)  5.3  H
 
  Chloride (98 - 107 mmol/L)  91  L
 
  Carbon Dioxide (22 - 30 mmol/L)  17  L
 
  Anion Gap (5 - 16)  22  H
 
  BUN (9 - 20 mg/dL)  26  H
 
  Creatinine (0.7 - 1.2 mg/dL)  1.2
 
  Estimated GFR (>60 ml/min)  > 60
 
  BUN/Creatinine Ratio (7 - 25 %)  21.7
 
  Glucose (65 - 99 mg/dL)  781 *H
 
  Hemoglobin A1c (4.2 - 5.8 %)  Pending
 
  Calcium (8.4 - 10.2 mg/dL)  10.0
 
  Magnesium Cancelled 
 
  Total Bilirubin (0.2 - 1.3 mg/dL)  1.2
 
  AST (17 - 59 U/L)  33
 
  ALT (21 - 72 U/L)  67
 
  Alkaline Phosphatase (< 127 U/L)  147  H
 
  Troponin I (<0.11 ng/ml)  < 0.01
 
  Total Protein (6.3 - 8.2 g/dL)  6.6
 
  Albumin (3.5 - 5.0 g/dL)  4.1
 
  Globulin (1.9 - 4.2 gm/dL)  2.5
 
  Albumin/Globulin Ratio (1.1 - 2.2 %)  1.6
 
  Amylase (30 - 110 U/L)  37
 
  Lipase (23 - 300 U/L)  87
 
Hematology  
 
  CBC w Diff Cancelled NO MAN DIFF REQ
 
  WBC (4.8 - 10.8 /CUMM) Cancelled 6.0
 
  RBC (4.70 - 6.10 /CUMM) Cancelled 4.44  L
 
  Hgb (14.0 - 18.0 G/DL) Cancelled 13.0  L
 
  Hct (42 - 52 %) Cancelled 38.4  L
 
  MCV (80.0 - 94.0 FL) Cancelled 86.4
 
  MCH (27.0 - 31.0 PG) Cancelled 29.2
 
  RDW (11.5 - 14.5 %) Cancelled 12.9
 
  Plt Count (130 - 400 /CUMM) Cancelled 312
 
  MPV (7.4 - 10.4 FL) Cancelled 7.3  L
 
  Gran % (42.2 - 75.2 %)  70.8
 
  Lymphocytes % (20.5 - 51.1 %)  22.3
 
  Monocytes % (1.7 - 9.3 %)  5.9
 
  Eosinophils % (0 - 5 %)  0.7
 
  Basophils % (0.0 - 2.0 %)  0.3
 
  Absolute Granulocytes (1.4 - 6.5 /CUMM)  4.2
 
  Absolute Lymphocytes (1.2 - 3.4 /CUMM)  1.3
 
  Absolute Monocytes (0.10 - 0.60 /CUMM)  0.4
 
  Absolute Eosinophils (0.0 - 0.7 /CUMM)  0
 
  Absolute Basophils (0.0 - 0.2 /CUMM)  0
 
  PUBS MCHC (33.0 - 37.0 G/DL) Cancelled 33.8
 
Toxicology  
 
  Serum Alcohol (<10 MG/DL)  < 10.0
 
  Acetone Level (NEGATIVE)  POSITIVE AT 1:32 DIL
 
 
 
 
 
 
 0329
 
Toxicology 
 
  Serum Alcohol Cancelled
 
 
 
 
Assessment/Plan
Assessment/Plan
37 y/o male who has a history of  DM type 1 on insulin ( Levemir 16-20 units 
twice a day and Novolog before meals according to carbohydrates counting), was 
arrested yesterday due to an old restraint court order, missed Levemir dose last
night. 
 
He was admitted under ICU service initially for DKA with initial glucose level 
of 781, AG 22, bicarb 17, acetone positive at 1:32, K 5.3, sodium 130 and Cr 1.2
. 
 
Repeat blood work showed DKA resolved and patient would like to eat. From DM 
stand point, he can be transfered to the floor.
 
DM management:
1. start Levemir 15 units twice a day starting tonigt at 10 pm;
2. start Novolog coverage before meals and Novolog coverage at bedtime---detail 
see the inpatient DM orders;
3. monitor FSGs.
valeria follow.
 
 
Inpatient Diabetes Orders
Before Each Meal:
   Bolus Insulin: Novolog
   < 80 mg/dl: no coverage
    mg/dl: 4 units
   101-120 mg/dl: 4 units
   121-150 mg/dl: 4 units
   151-200 mg/dl: 6 units
   201-250 mg/dl: 8 units
   251-300 mg/dl: 10 units
   301-350 mg/dl: 12 units
   351-400 mg/dl: 14 units
   > 400 mg/dl: 16 units
Bedtime:
   Bolus Insulin: Novolog
   < 80 mg/dl: no coverage
    mg/dl: no coverage
   101-120 mg/dl: no coverage
   121-150 mg/dl: no coverage
   151-200 mg/dl: no coverage
   201-250 mg/dl: no coverage
   251-300 mg/dl: 2 units
   301-350 mg/dl: 3 units
   351-400 mg/dl: 4 units
   > 400 mg/dl: 5 units
 
Consult Acknowledgment
- Thank you for your consult request.
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
## 2017-03-15 VITALS — DIASTOLIC BLOOD PRESSURE: 80 MMHG | SYSTOLIC BLOOD PRESSURE: 130 MMHG

## 2017-03-15 VITALS — SYSTOLIC BLOOD PRESSURE: 136 MMHG | DIASTOLIC BLOOD PRESSURE: 90 MMHG

## 2017-03-15 VITALS — DIASTOLIC BLOOD PRESSURE: 70 MMHG | SYSTOLIC BLOOD PRESSURE: 130 MMHG

## 2017-03-15 NOTE — PATIENT DISCHARGE INSTRUCTIONS
Psych Discharge Inst
 
General Discharge Information
You were seen/treated for:
Diabetic ketoacidosis
Watch for these problems:
Hyperglycemia and hypoglycemia
Special Instructions:
Please follow-up with Dr. mcwilliams soon after discharge
Please take your insulin as prescribed
 
Diet
Recommended Diet: Diabetic
 
Activity
Full Activity/No Limits: Yes
Activity Self Limited: Yes
 
Nursing Information
Vital Signs and Vaccine Data
 Vital Signs
 
 
 Result Date Time
 
Pulse Ox 96 03/15 0721
 
B/P 136/90 03/15 0721
 
O2 Delivery Room Air 03/15 0721
 
Temp 97.1 03/15 0721
 
Pulse 85 03/15 0721
 
Resp 20 03/15 0721
 
O2 Flow Rate Room Air 03/14 1418
 
 
Last BM: 03/14/17
Date of last Pneumonia Vaccine (if documented): 
Date of last Influenza Vaccine (if documented):

## 2017-03-15 NOTE — PN- ATT ADDEND
Attending Addendum
Attending Brief Note
Patient seen and examined, feels overall much better, blood sugars are running 
in acceptable range. Has been seen by Alistair and cleared for discharge home. 
 
Vital Signs
 
 
Date Time Temp Pulse Resp B/P Pulse O2 O2 Flow FiO2
 
     Ox Delivery Rate 
 
03/15 0721 97.1 85 20 136/90 96 Room Air  
 
03/15 0010 98.1 90 20 130/80 94 Room Air  
 
03/14 1926      Room Air  
 
03/14 1418 96.0 90 20 116/58 98 Room Air Room Air 
 
 
on exam:
aox3, nad. 
cv; s1,s2, rrr
resp; clear
abd; soft, nt, bs+
ext; no edema. 
 
no labs. 
 
A/P: 36-year-old male with history significant for diabetes with diabetic 
neuropathy who was admitted with DKA.  Patient was initially PECd by psych but 
now he has been seen by psychiatry Alistair Delvalle and is cleared to be discharged 
home.  DKA resolved.  Currently patient on basal as well as short-acting 
insulin.  Blood sugars are running in acceptable range. 
Continue all current medications and patient is medically stable for discharge 
home today.

## 2017-03-15 NOTE — PN- HOUSESTAFF
Subjective
Follow-up For:
DKA
Subjective:
Patient was seen and examined, he he is mildly agitated, patient would like to 
be discharged as soon as possible.  Patient refused or drink food because it's 
diabetic diet and he would like to be on regular diet.  Patient denies fever, 
chills, abdominal pain, nausea, or vomiting
 
Review of Systems
Constitutional:
Reports: no symptoms.  Denies: chills, fever. 
 
Objective
Last 24 Hrs of Vital Signs/I&O
 Vital Signs
 
 
Date Time Temp Pulse Resp B/P Pulse O2 O2 Flow FiO2
 
     Ox Delivery Rate 
 
03/15 1431 98.2 77 20 130/70 96   
 
03/15 0721 97.1 85 20 136/90 96 Room Air  
 
03/15 0010 98.1 90 20 130/80 94 Room Air  
 
 1926      Room Air  
 
 
 Intake & Output
 
 
 03/15 1600 03/15 0800 03/15 0000
 
Intake Total 800 100 120
 
Output Total   
 
Balance 800 100 120
 
    
 
Intake, Oral 800 100 120
 
Number 1 0 0
 
Bowel   
 
Movements   
 
Patient   108.862 kg
 
Weight   
 
 
 
 
Physical Exam
General Appearance: Alert, Oriented X3, Cooperative, No Acute Distress
HEENT: Atraumatic, PERRLA, EOMI, Mucous Membr. moist/pink
Cardiovascular: Regular Rate, Normal S1, Normal S2, No Murmurs
Lungs: Clear to Auscultation
Abdomen: Soft, No Tenderness
Neurological: Normal Speech
Extremities: No Clubbing, No Cyanosis, No Edema
Current Medications:
 Current Medications
 
 
  Sig/Rubina Start time  Last
 
Medication Dose Route Stop Time Status Admin
 
Acetaminophen 650 MG Q6P PRN  0545 DCD 
 
  PO   
 
Atorvastatin Calcium 20 MG 1700  1700 DCD 
 
  PO   
 
Heparin Sodium  5,000 UNIT Q8  0600 DCD 
 
(Porcine)  SC   2133
 
Insulin Aspart 0 TIDAC/HS  1245 DCD 03/15
 
  SC   1314
 
Insulin Detemir 15 UNITS BID  2200 DCD 03/15
 
  SC   0943
 
Omeprazole 40 MG DAILY AC  0700 DCD 
 
  PO   
 
Patient Medication  1 ED ONE ONE 03/15 1345 DC 
 
Teaching  ED 03/15 1346  
 
Pregabalin 75 MG DAILY  1000 DCD 03/15
 
  PO   0942
 
 
 
 
Assessment/Plan
Assessment:
Patient once initially presented by police and was found to have symptoms and 
signs suggestive of DKA, patient was treated initially with insulin drip and 
fluids and now he is stable and all his lab within normal limits.  Psychiatric 
assessment suggested that patient is not safe to be discharged before we make 
sure that he is not a danger to his wife, initially he was refusing to allow the
psychiatric to call his wife, however today he agreed to sign a paper that would
give the psychiatric permission to talk to the wife and make sure she will be 
safe.
 
#DKA
 Resolved and all patient lab within normal limits
* He will be continued on the insulin regimen that was placed by the 
endocrinology
* Patient is stable to be discharged after he was cleared by psychiatric.
* Patient will be discharged on the insulin regimen suggested by endocrinology 
and will he will be advised to follow-up with endocrinology within 1 week.
* We will continue all his home medication and he will be discharged on the same
medication
* Even that patient is diabetic he is refusing to eat except regular food, he 
will receive one meal before discharge.
 
Diabetic diet
DVT prophylaxis with subcutaneous heparin
Full code
Problem List:
 1. DKA (diabetic ketoacidoses)
 
Pain Ratin
Pain Location:
NA
Pain Goal: Remain pain free
Pain Plan:
See assessment and plan
Tomorrow's Labs & Rationales:
See assessment and plan

## 2017-03-15 NOTE — PN- PSYCHIATRY
Assessment/Plan
Impression:
The patient has given written permission to talk with his spouse today. I spoke 
with her at about 1000, and she reports that she is not concerned for her safety
, nor for the safety of her children. The wife, Michelle Rodriguez, states that she
does not know who called the police two nights ago, which resulted in the 
patient's arrest. She reports that the family lives in a quiet neighborhood, and
the patient was arguing with their adolescent children, the patient's step-
children, a 13 y.o. and a 16 y.o., and was in the process of fixing something 
for them when the police arrived. 
 
The patient was arrested two nights ago for violating a protection order, which 
had been placed to protect her when they were  for 9 years. They have 
reconciled as of 2.5 years ago, and have been living together. The  protection 
order is being reviewed by their  and they are in the process of having 
it modified in court.
 
The family is living with the patient's mother, whose house may be nearing 
[facility]. The patient is unsure where they will live, and for that reason is 
unwilling to start psychiatry treatment at the local [facility]. I have 
encouraged him to begin treatment again for ADHD and anger, and his treatment 
could be transferred. The patient believes his anger problem has resolved.
 
Martin also reports that when he was  from his wife, he had a daughter
, now 5 years old, with another woman. He is unable to visit this daughter, who 
may be in the process of adoption. [facility] is involved with this case.
 
Urine toxicology negative.
Suggestion:
1. The patient is clear to discharge from a psychiatric viewpoint. The PEC in 
the chart is void.
He is awaiting medical clearance after presenting with DKA.
 
2. Encourage the patient to return to care at [facility], who can provide 
psychiatry treatment and housing assistance.
 
3. Please review diabetic teaching. The patient has an inattention problem, and 
can forget to take his basal insulin.  Per his wife, he has sufficient insulin 
supplies at home.
 
4. Thepatient may need a letter for court stating that he was in the hospital 
today.
 
5. The patient does not have winter wear with him and needs a winter coat at 
discharge.
 
Thank-you for asking us to participate in Martin's care.
We do not anticipate further visits.
JAVON León, Pager [phone]
 
 
Subjective
Subjective:
I visited the patient today at 1015. His 1:1 safety monitor is in place. The 
patient is calm, cooperative and conversational.
A+OX4
Denies SI/HI
No delusional thinking.
Thought processes logical and linear
He needs some redirection for circumstantial responses, but no tangential 
responses today. 
 
Objective
Last 24 Hrs of Vital Signs/I&O
 Vital Signs
 
 
Date Time Temp Pulse Resp B/P Pulse O2 O2 Flow FiO2
 
     Ox Delivery Rate 
 
03/15 0721 97.1 85 20 136/90 96 Room Air  
 
03/15 0010 98.1 90 20 130/80 94 Room Air  
 
03/14 1926      Room Air  
 
03/14 1418 96.0 90 20 116/58 98 Room Air Room Air 
 
03/14 1146 97.5 94 18 125/78 97 Room Air  
 
 
 Intake & Output
 
 
 03/15 1600 03/15 0800 03/15 0000
 
Intake Total  100 120
 
Output Total   
 
Balance  100 120
 
    
 
Intake, Oral  100 120
 
Number  0 0
 
Bowel   
 
Movements   
 
Patient   240 lb
 
Weight   
 
 
 
 
Results
Last 24 Hrs of Labs/Mics:
 Laboratory Tests
 
 
 03/14 03/14
 
 1140 1138
 
Chemistry  
 
  Sodium (137 - 145 mmol/L) 140 
 
  Potassium (3.5 - 5.1 mmol/L) 4.3 
 
  Chloride (98 - 107 mmol/L) 102 
 
  Carbon Dioxide (22 - 30 mmol/L) 25 
 
  Anion Gap (5 - 16) 13 
 
  BUN (9 - 20 mg/dL) 19 
 
  Creatinine (0.7 - 1.2 mg/dL) 0.9 
 
  Estimated GFR (>60 ml/min) > 60 
 
  Glucose (65 - 99 mg/dL) 136  H 
 
  Calcium (8.4 - 10.2 mg/dL) 10.1 
 
  Phosphorus (2.5 - 4.5 mg/dL) 3.9 
 
  Magnesium (1.6 - 2.3 mg/dL) 2.2 
 
  Total Bilirubin (0.2 - 1.3 mg/dL) 1.0 
 
  AST (17 - 59 U/L) 33 
 
  ALT (21 - 72 U/L) 66 
 
  Troponin I (<0.11 ng/ml) < 0.01 
 
  Albumin (3.5 - 5.0 g/dL) 4.6 
 
Hematology  
 
  CBC w Diff NO MAN DIFF REQ 
 
  WBC (4.8 - 10.8 /CUMM) 5.9 
 
  RBC (4.70 - 6.10 /CUMM) 4.72 
 
  Hgb (14.0 - 18.0 G/DL) 13.6  L 
 
  Hct (42 - 52 %) 39.9  L 
 
  MCV (80.0 - 94.0 FL) 84.7 
 
  MCH (27.0 - 31.0 PG) 28.8 
 
  RDW (11.5 - 14.5 %) 13.1 
 
  Plt Count (130 - 400 /CUMM) 392 
 
  MPV (7.4 - 10.4 FL) 7.2  L 
 
  Gran % (42.2 - 75.2 %) 56.0 
 
  Lymphocytes % (20.5 - 51.1 %) 32.5 
 
  Monocytes % (1.7 - 9.3 %) 8.7 
 
  Eosinophils % (0 - 5 %) 2.3 
 
  Basophils % (0.0 - 2.0 %) 0.5 
 
  Absolute Granulocytes (1.4 - 6.5 /CUMM) 3.3 
 
  Absolute Lymphocytes (1.2 - 3.4 /CUMM) 1.9 
 
  Absolute Monocytes (0.10 - 0.60 /CUMM) 0.5 
 
  Absolute Eosinophils (0.0 - 0.7 /CUMM) 0.1 
 
  Absolute Basophils (0.0 - 0.2 /CUMM) 0 
 
  PUBS MCHC (33.0 - 37.0 G/DL) 34.0 
 
Toxicology  
 
  Urine Opiates Screen (>2000 NG/ML)  < 100.00
 
  Methadone Screen (>300 NG/ML)  < 40
 
  Barbiturate Screen (>200 NG/ML)  < 60
 
  Ur Phencyclidine Scrn (>25 NG/ML)  < 6.00
 
  Amphetamines Screen (>1000 NG/ML)  < 100
 
  U Benzodiazepines Scrn (>200 NG/ML)  < 85
 
  Urine Cocaine Screen (>300 NG/ML)  < 50
 
  Urine Cannabis Screen (>50 NG/ML)  < 5.00
 
Urines  
 
  Urine Color (YEL,AMB,STR)  YEL
 
  Urine Clarity (CLEAR)  CLEAR
 
  Urine pH (5.0 - 8.0)  6.0
 
  Ur Specific Gravity (1.001 - 1.035)  1.020
 
  Urine Protein (NEG,<30 MG/DL)  NEG
 
  Urine Ketones (NEG)  40  H
 
  Urine Nitrite (NEG)  NEG
 
  Urine Bilirubin (NEG)  NEG
 
  Urine Urobilinogen (0.1  -  1.0 EU/dl)  0.2
 
  Ur Leukocyte Esterase (NEG)  NEG
 
  Ur Microscopic  EXAM NOT REQUIRED
 
  Urine Hemoglobin (NEG)  NEG
 
  Urine Glucose (N MG/DL)  >=1000  H
 
 
 
 
 03/14
 
 1130
 
Chemistry 
 
  Magnesium Cancelled
 
Hematology 
 
  CBC w Diff Cancelled
 
  WBC Cancelled
 
  RBC Cancelled
 
  Hgb Cancelled
 
  Hct Cancelled
 
  MCV Cancelled
 
  MCH Cancelled
 
  RDW Cancelled
 
  Plt Count Cancelled
 
  MPV Cancelled
 
  PUBS MCHC Cancelled

## 2017-03-15 NOTE — PATIENT DISCHARGE INSTRUCTIONS
Discharge Instructions
 
General Discharge Information
You were seen/treated for:
Diabetic ketoacidosis
Watch for these problems:
Hyperglycemia and hypoglycemia
Special Instructions:
Please follow-up with Dr. mcwilliams the endocrinologist, soon after
discharge
Please follow-up with your primary care doctor within 1 week
Please take your insulin as prescribed
 
 
Diet
Recommended Diet: Diabetic
 
Activity
Full Activity/No Limits: Yes
Activity Self Limited: Yes
 
Acute Coronary Syndrome
 
Inclusion Criteria
At DC or during hospital stay patient has or had the following:
ACS DIAGNOSIS No
 
Discharge Core Measures
Meds if any: Prescribed or Continued at Discharge
Meds if any: NOT Prescribed or Continued at Discharge
 
Congestive Heart Failure
 
Inclusion Criteria
At DC or during hospital stay patient has or had the following:
CHF DIAGNOSIS No
 
Discharge Core Measures
Meds if any: Prescribed or Continued at Discharge
Meds if any: NOT Prescribed or Continued at Discharge
 
Cerebrovascular accident
 
Inclusion Criteria
At DC or during hospital stay patient has or had the following:
CVA/TIA Diagnosis No
 
Discharge Core Measures
Meds if any: Prescribed or Continued at Discharge
Meds if any: NOT Prescribed or Continued at Discharge
 
Venous thromboembolism
 
Inclusion Criteria
VTE Diagnosis No
VTE Type NONE
VTE Confirmed by (Test) NONE
 
Discharge Core Measures
- Per Current guidelines, there needs to be overlap
- treatment for the first 5 days of Warfarin therapy.
- If discharged on Warfarin prior to 5 days of
- overlap therapy, the patient will need to be
- assessed for post discharge needs including
- *Post discharge parental anticoagulation
- *Warfarin and/or parental anticoagulation education
- *Follow up date to check INR post discharge
At least 5 days overlap therapy as Inpatient No
Meds if any: Prescribed or Continued at Discharge
Note: Overlap Therapy is Warfarin and Anticoagulant
Meds if any: NOT Prescribed or Continued at Discharge

## 2017-03-15 NOTE — PN- DIABETES
Assessment/Plan
Assessment:
37 y/o male who has a history of  DM type 1 on insulin ( Levemir 16-20 units 
twice a day and Novolog before meals according to carbohydrates counting), was 
arrested 2 days ago due to an old restraint court order, missed Levemir dose 
that night. He was admitted under ICU service initially for DKA with initial 
glucose level of 781, AG 22, bicarb 17, acetone positive at 1:32, K 5.3, sodium 
130 and Cr 1.2 . 
 
Repeat blood work done showed DKA resolved and patient would like to eat. 
Therefore, he was transfered to the floor.
 
He was put on Levemir 15 units twice a day, Novolog coverage before meals and 
Novolog coverage at bedtime. However, he hasn't had meals and Novolog coverage 
has been held.
 
His FSGs were 136, 117, 144 and 163. His HbA1c was 10.4%.
 
Patient is still agitated and anger, and would like to be discharged.
 
Plan:
1. From DM standpoint, he is stable for discharge at this point.
2. I will continue Levemir 15 units twice a day; however, if he skips meals, 
Levemir will be decreased to 10 units twice a day;
3. continue the current Novolog coverage before meals and Novolog coverage at 
bedtime; however, Novolog meal coverage will be held if he skips meal.
4. continue monitoring his FSGs.
5. If medically he is stable for discharge, the discharge plan for DM
----Levemir 15 units twice a day;
----Novolog meal coverage according to carbohydrates counting ( IC ratio 5 grams
; insulin sensitivity 20 mg/dl and target 150 mg/dl).
----f/u in office after discharge.
 
Subjective
Subjective:
He would like to be discharged.
 
Objective
Last 24 Hrs of Vital Signs/I&O
 Vital Signs
 
 
Date Time Temp Pulse Resp B/P Pulse O2 O2 Flow FiO2
 
     Ox Delivery Rate 
 
03/15 0721 97.1 85 20 136/90 96 Room Air  
 
03/15 0010 98.1 90 20 130/80 94 Room Air  
 
03/14 1926      Room Air  
 
03/14 1418 96.0 90 20 116/58 98 Room Air Room Air 
 
 
 Intake & Output
 
 
 03/15 1600 03/15 0800 03/15 0000
 
Intake Total  100 120
 
Output Total   
 
Balance  100 120
 
    
 
Intake, Oral  100 120
 
Number  0 0
 
Bowel   
 
Movements   
 
Patient   240 lb
 
Weight   
 
 
 
 
Findings
Pertinent Lab/Rayshawn Results:
 Laboratory Tests
 
 
 03/14 03/14
 
 1140 1138
 
Chemistry  
 
  Sodium (137 - 145 mmol/L) 140 
 
  Potassium (3.5 - 5.1 mmol/L) 4.3 
 
  Chloride (98 - 107 mmol/L) 102 
 
  Carbon Dioxide (22 - 30 mmol/L) 25 
 
  Anion Gap (5 - 16) 13 
 
  BUN (9 - 20 mg/dL) 19 
 
  Creatinine (0.7 - 1.2 mg/dL) 0.9 
 
  Estimated GFR (>60 ml/min) > 60 
 
  Glucose (65 - 99 mg/dL) 136  H 
 
  Calcium (8.4 - 10.2 mg/dL) 10.1 
 
  Phosphorus (2.5 - 4.5 mg/dL) 3.9 
 
  Magnesium (1.6 - 2.3 mg/dL) 2.2 
 
  Total Bilirubin (0.2 - 1.3 mg/dL) 1.0 
 
  AST (17 - 59 U/L) 33 
 
  ALT (21 - 72 U/L) 66 
 
  Troponin I (<0.11 ng/ml) < 0.01 
 
  Albumin (3.5 - 5.0 g/dL) 4.6 
 
Hematology  
 
  CBC w Diff NO MAN DIFF REQ 
 
  WBC (4.8 - 10.8 /CUMM) 5.9 
 
  RBC (4.70 - 6.10 /CUMM) 4.72 
 
  Hgb (14.0 - 18.0 G/DL) 13.6  L 
 
  Hct (42 - 52 %) 39.9  L 
 
  MCV (80.0 - 94.0 FL) 84.7 
 
  MCH (27.0 - 31.0 PG) 28.8 
 
  RDW (11.5 - 14.5 %) 13.1 
 
  Plt Count (130 - 400 /CUMM) 392 
 
  MPV (7.4 - 10.4 FL) 7.2  L 
 
  Gran % (42.2 - 75.2 %) 56.0 
 
  Lymphocytes % (20.5 - 51.1 %) 32.5 
 
  Monocytes % (1.7 - 9.3 %) 8.7 
 
  Eosinophils % (0 - 5 %) 2.3 
 
  Basophils % (0.0 - 2.0 %) 0.5 
 
  Absolute Granulocytes (1.4 - 6.5 /CUMM) 3.3 
 
  Absolute Lymphocytes (1.2 - 3.4 /CUMM) 1.9 
 
  Absolute Monocytes (0.10 - 0.60 /CUMM) 0.5 
 
  Absolute Eosinophils (0.0 - 0.7 /CUMM) 0.1 
 
  Absolute Basophils (0.0 - 0.2 /CUMM) 0 
 
  PUBS MCHC (33.0 - 37.0 G/DL) 34.0 
 
Toxicology  
 
  Urine Opiates Screen (>2000 NG/ML)  < 100.00
 
  Methadone Screen (>300 NG/ML)  < 40
 
  Barbiturate Screen (>200 NG/ML)  < 60
 
  Ur Phencyclidine Scrn (>25 NG/ML)  < 6.00
 
  Amphetamines Screen (>1000 NG/ML)  < 100
 
  U Benzodiazepines Scrn (>200 NG/ML)  < 85
 
  Urine Cocaine Screen (>300 NG/ML)  < 50
 
  Urine Cannabis Screen (>50 NG/ML)  < 5.00
 
Urines  
 
  Urine Color (YEL,AMB,STR)  YEL
 
  Urine Clarity (CLEAR)  CLEAR
 
  Urine pH (5.0 - 8.0)  6.0
 
  Ur Specific Gravity (1.001 - 1.035)  1.020
 
  Urine Protein (NEG,<30 MG/DL)  NEG
 
  Urine Ketones (NEG)  40  H
 
  Urine Nitrite (NEG)  NEG
 
  Urine Bilirubin (NEG)  NEG
 
  Urine Urobilinogen (0.1  -  1.0 EU/dl)  0.2
 
  Ur Leukocyte Esterase (NEG)  NEG
 
  Ur Microscopic  EXAM NOT REQUIRED
 
  Urine Hemoglobin (NEG)  NEG
 
  Urine Glucose (N MG/DL)  >=1000  H
 
 
 
 
 03/14 03/14
 
 1130 0730
 
Chemistry  
 
  Sodium  Cancelled
 
  Potassium  Cancelled
 
  Chloride  Cancelled
 
  Carbon Dioxide  Cancelled
 
  Anion Gap  Cancelled
 
  BUN  Cancelled
 
  Creatinine  Cancelled
 
  BUN/Creatinine Ratio  Cancelled
 
  Magnesium Cancelled 
 
Hematology  
 
  CBC w Diff Cancelled 
 
  WBC Cancelled 
 
  RBC Cancelled 
 
  Hgb Cancelled 
 
  Hct Cancelled 
 
  MCV Cancelled 
 
  MCH Cancelled 
 
  RDW Cancelled 
 
  Plt Count Cancelled 
 
  MPV Cancelled 
 
  PUBS MCHC Cancelled 
 
 
 
 
 03/14 03/14
 
 5703 1709
 
Chemistry  
 
  Sodium (137 - 145 mmol/L) 130  L 
 
  Potassium (3.5 - 5.1 mmol/L) 5.3  H 
 
  Chloride (98 - 107 mmol/L) 91  L 
 
  Carbon Dioxide (22 - 30 mmol/L) 17  L 
 
  Anion Gap (5 - 16) 22  H 
 
  BUN (9 - 20 mg/dL) 26  H 
 
  Creatinine (0.7 - 1.2 mg/dL) 1.2 
 
  Estimated GFR (>60 ml/min) > 60 
 
  BUN/Creatinine Ratio (7 - 25 %) 21.7 
 
  Glucose (65 - 99 mg/dL) 781 *H 
 
  Hemoglobin A1c (4.2 - 5.8 %) 10.4  H 
 
  Calcium (8.4 - 10.2 mg/dL) 10.0 
 
  Total Bilirubin (0.2 - 1.3 mg/dL) 1.2 
 
  AST (17 - 59 U/L) 33 
 
  ALT (21 - 72 U/L) 67 
 
  Alkaline Phosphatase (< 127 U/L) 147  H 
 
  Troponin I (<0.11 ng/ml) < 0.01 
 
  Total Protein (6.3 - 8.2 g/dL) 6.6 
 
  Albumin (3.5 - 5.0 g/dL) 4.1 
 
  Globulin (1.9 - 4.2 gm/dL) 2.5 
 
  Albumin/Globulin Ratio (1.1 - 2.2 %) 1.6 
 
  Amylase (30 - 110 U/L) 37 
 
  Lipase (23 - 300 U/L) 87 
 
Hematology  
 
  CBC w Diff NO MAN DIFF REQ 
 
  WBC (4.8 - 10.8 /CUMM) 6.0 
 
  RBC (4.70 - 6.10 /CUMM) 4.44  L 
 
  Hgb (14.0 - 18.0 G/DL) 13.0  L 
 
  Hct (42 - 52 %) 38.4  L 
 
  MCV (80.0 - 94.0 FL) 86.4 
 
  MCH (27.0 - 31.0 PG) 29.2 
 
  RDW (11.5 - 14.5 %) 12.9 
 
  Plt Count (130 - 400 /CUMM) 312 
 
  MPV (7.4 - 10.4 FL) 7.3  L 
 
  Gran % (42.2 - 75.2 %) 70.8 
 
  Lymphocytes % (20.5 - 51.1 %) 22.3 
 
  Monocytes % (1.7 - 9.3 %) 5.9 
 
  Eosinophils % (0 - 5 %) 0.7 
 
  Basophils % (0.0 - 2.0 %) 0.3 
 
  Absolute Granulocytes (1.4 - 6.5 /CUMM) 4.2 
 
  Absolute Lymphocytes (1.2 - 3.4 /CUMM) 1.3 
 
  Absolute Monocytes (0.10 - 0.60 /CUMM) 0.4 
 
  Absolute Eosinophils (0.0 - 0.7 /CUMM) 0 
 
  Absolute Basophils (0.0 - 0.2 /CUMM) 0 
 
  PUBS MCHC (33.0 - 37.0 G/DL) 33.8 
 
Toxicology  
 
  Serum Alcohol (<10 MG/DL) < 10.0 Cancelled
 
  Acetone Level (NEGATIVE) POSITIVE AT 1:32 DIL

## 2017-03-17 NOTE — DISCHARGE SUMMARY
Visit Information
 
Visit Dates
Admission Date:
03/14/17
 
Discharge Date:
03/15/17
 
 
Hospital Course
 
Course
Attending Physician:
ADAM HOLLY MD
 
Primary Care Physician:
FRANNIE CULVER,[facility]
 
Hospital Course:
36-year-old man with past medical history of DKA, hypertension, DM (non-
complaint  with medication) and diabetic neuropathy who presented because of 
Hyperglycemia. Patient denies any nausea, vomiting, or abdominal pain. 
 
He was admitted under ICU service initially for DKA with initial glucose level 
of 781, AG 22, bicarb 17, acetone positive at 1:32, K 5.3, sodium 130 and Cr 1.2
. His vital was stable on admission. patient was treated with insulin drip and 
fluids on the ICU until he became stable and all his lab became within normal 
limits.
 
On discharged was instructed to 
* Take Levemir 15 units twice a day; however, if he skips meals, Levemir will be
decreased to 10 units twice a day
* Take Humalog as one unit for every 5 g of carbohydrate.
* A diabetic teaching was done for better medication compliance.
* pt was instructed to follow up with  Dr. mcwilliams the endocrinologist, soon after 
discharge
* We encouraging  the patient to return to care at [facility], who can provide 
psychiatry treatment and housing assistance.
Allergies:
Coded Allergies:
Penicillins (ANAPHYLAXIS 01/24/17)
aspirin (ANAPHYLAXIS 01/24/17)
 
 
Disposition Summary
 
Disposition
Principal Diagnosis:
DKA
Additional Diagnosis:
Hypertension
Diabetic neuropathy
Discharge Disposition: home or self care
 
Discharge Instructions
 
General Discharge Information
Code Status: Full Code
Patient's Diet:
Diabetic 
Patient's Activity:
As tolerated 
Follow-Up Instructions/Appts:
1.Please follow-up with Dr. mcwilliams the endocrinologist.
2.Please follow-up with your primary care doctor within one week.
3.please follow-up with psychiatry care at [facility].
 
Medications at Discharge
Discharge Medications:
Stop taking the following medications:
Insulin Detemir (Levemir Flextouch) 100 UNIT/ML (3 ML) INSULN.PEN Inject into 
fatty tissue TWICE DAILY Qty = 15
 
Continue taking these medications:
Albuterol Sulfate (Proair Hfa) 90 MCG HFA.AER.AD
    2 Puff Inhale through mouth EVERY 4-6 HOURS AS NEEDED as needed for SOB
    Qty = 1
    Comments:
       DID NOT RECEIVE WHILE IN HOSPITAL
 
Omeprazole (Omeprazole) 40 MG CAPSULE.DR
    1 Capsule ORAL DAILY
    Qty = 30
    Comments:
       DID NOT RECEIVE WHILE IN HOSPITAL
 
Lisinopril (Lisinopril) 20 MG TABLET
    1 Tablet ORAL DAILY
    Qty = 30
    Comments:
       DID NOT RECEIVE WHILE IN HOSPITAL
 
Atorvastatin Calcium (Atorvastatin Calcium) 20 MG TABLET
    1 Tablet ORAL DAILY
    Qty = 30
    Comments:
       DID NOT RECEIVE WHILE IN HOSPITAL
 
Insulin Lispro (Humalog Kwikpen U-100) 100 UNIT/ML INSULN.PEN
    0 Inject into fatty tissue 3 TIMES DAILY BEFORE MEALS
    Qty = 45
    Instructions:
       please take one units of Humalog for each 5 g of carbohydrate
       He states that dose
        prior to each meal
    Comments:
       Last Taken: 3/15/17
             Time:1200
 
Pregabalin (Lyrica) 150 MG CAPSULE
    1 Capsule ORAL DAILY
    Qty = 30
    Comments:
       Last Taken: 3/15/17
             Time: 9:45 AM
 
Ergocalciferol (Vitamin D2) (Vitamin D2) 50,000 UNIT CAPSULE
    1 Capsule ORAL EVERY 2 WEEKS
    Qty = 6
    Comments:
       DID NOT RECEIVE WHILE IN HOSPITAL
 
Insulin Detemir (Levemir Flextouch) 100 UNIT/ML (3 ML) INSULN.PEN
    15 Unit Inject into fatty tissue TWICE DAILY
    Qty = 30
    Instructions:
       if you skip a meal Levemir will be decreased to 10 units twice a
       day at this day
    Comments:
       Last Taken:3/15/17
             Time:0945
    This prescription has been renewed
 
 
Copies To:
FRANNIE CULVER,MITCHEL; ASHLEE CULVER,KARO

## 2017-04-04 ENCOUNTER — HOSPITAL ENCOUNTER (OUTPATIENT)
Dept: HOSPITAL 68 - STS | Age: 37
End: 2017-04-04
Attending: SURGERY
Payer: COMMERCIAL

## 2017-04-04 VITALS — HEIGHT: 71 IN | BODY MASS INDEX: 30.1 KG/M2 | WEIGHT: 215 LBS

## 2017-04-04 DIAGNOSIS — D17.1: Primary | ICD-10-CM

## 2017-04-04 DIAGNOSIS — K21.9: ICD-10-CM

## 2017-04-04 DIAGNOSIS — E11.8: ICD-10-CM

## 2017-04-04 DIAGNOSIS — Z79.84: ICD-10-CM

## 2017-04-04 DIAGNOSIS — J45.909: ICD-10-CM

## 2017-04-04 NOTE — OPERATIVE REPORT
Operative/Inv Procedure Report
Surgery Date: 04/04/17
Name of Procedure:
Excision of subfascial 4 cm left mid back lipoma
Pre-Operative Diagnosis:
Left mid back lipoma
Post-Operative Diagnosis:
Same
Estimated Blood Loss: scant
Surgeon/Assistant:
ELISE CULVER,NERY GONZALES
Anesthesia: general endotracheal tube
 
Operative/Procedure Note
Note:
Patient was positioned supine on the table.  After successful induction of 
anesthesia, he was turned in position and secured right lateral down.  A 
horizontal incision was planned paraspinal to the left mid back about 4 cm long,
local anesthetic was injected and incision was made with a 15 blade and then 
deepened with cautery through the subcutaneous layer through the fascia and then
you could make out the surface of the round flat lipoma which had to be 
dissected free from the surrounding fascia it was tenacious but we removed it 
completely with cautery it was a little bit lobulated we checked for hemostasis 
irrigated and closed back up in layers using Vicryl 2-0 for the fascia and then 
a few subdermal 3-0 Vicryl suture interrupted followed by 4-0 Monocryl for the 
skin itself followed by Mastisol Steri-Strips Telfa and Tegaderm.  The fascia is
closed with a figure-of-eight 0 Vicryl suture, tying the stay sutures on top.  
Then we closed the 3 skin incisions with interrupted 4-0 Monocryl, 3 for the 
umbilical, one each for the smaller ones, followed by Mastisol, Steri-Strips and
Band-Aids. Overall estimated blood loss was minimal, lap and sponge counts were 
correct, wound expectancy was clean, IV fluids crystalloid, complications none, 
patient tolerated the procedure well, did not significantly henderson during 
extubation and was returned to the recovery room in satisfactory condition.

## 2018-01-12 ENCOUNTER — HOSPITAL ENCOUNTER (EMERGENCY)
Dept: HOSPITAL 68 - ERH | Age: 38
End: 2018-01-12
Payer: COMMERCIAL

## 2018-01-12 VITALS — HEIGHT: 71 IN | WEIGHT: 237 LBS | BODY MASS INDEX: 33.18 KG/M2

## 2018-01-12 VITALS — SYSTOLIC BLOOD PRESSURE: 121 MMHG | DIASTOLIC BLOOD PRESSURE: 70 MMHG

## 2018-01-12 DIAGNOSIS — E10.65: Primary | ICD-10-CM

## 2018-01-12 LAB
ABSOLUTE GRANULOCYTE CT: 5.1 /CUMM (ref 1.4–6.5)
BASOPHILS # BLD: 0 /CUMM (ref 0–0.2)
BASOPHILS NFR BLD: 0.4 % (ref 0–2)
EOSINOPHIL # BLD: 0.1 /CUMM (ref 0–0.7)
EOSINOPHIL NFR BLD: 1.3 % (ref 0–5)
ERYTHROCYTE [DISTWIDTH] IN BLOOD BY AUTOMATED COUNT: 12.2 % (ref 11.5–14.5)
GRANULOCYTES NFR BLD: 73.6 % (ref 42.2–75.2)
HCT VFR BLD CALC: 40.3 % (ref 42–52)
LYMPHOCYTES # BLD: 1.4 /CUMM (ref 1.2–3.4)
MCH RBC QN AUTO: 27.6 PG (ref 27–31)
MCHC RBC AUTO-ENTMCNC: 33 G/DL (ref 33–37)
MCV RBC AUTO: 83.8 FL (ref 80–94)
MONOCYTES # BLD: 0.3 /CUMM (ref 0.1–0.6)
PLATELET # BLD: 300 /CUMM (ref 130–400)
PMV BLD AUTO: 7.2 FL (ref 7.4–10.4)
RED BLOOD CELL CT: 4.81 /CUMM (ref 4.7–6.1)
WBC # BLD AUTO: 6.9 /CUMM (ref 4.8–10.8)

## 2018-01-12 NOTE — ED GENERAL ADULT
History of Present Illness
 
General
Chief Complaint: General Adult
Stated Complaint: BIBA ABD PAIN
Source: patient
Exam Limitations: no limitations
 
Vital Signs & Intake/Output
Vital Signs & Intake/Output
 Vital Signs
 
 
Date Time Temp Pulse Resp B/P B/P Pulse O2 O2 Flow FiO2
 
     Mean Ox Delivery Rate 
 
 2350 98.2 78 20 121/70  98   
 
7 97.9 93 18 118/69  99   
 
 
 
Allergies
Coded Allergies:
Penicillins (ANAPHYLAXIS 18)
aspirin (ANAPHYLAXIS 18)
 
Reconcile Medications
Acetaminophen 500 MG TABLET   3 TAB PO PRN PAIN  (Reported)
Albuterol Sulfate (Proair Hfa) 90 MCG HFA.AER.AD   2 PUF INH Q4-6 PRN PRN SOB
Budesonide (Pulmicort Flexhaler) 180 MCG AER.POW.BA   2 PUF INH BID ASTHMA  (
Reported)
Dicyclomine HCl 10 MG CAPSULE   1 CAP PO TID PRN GI  (Reported)
Ergocalciferol (Vitamin D2) (Vitamin D2) 50,000 UNIT CAPSULE   1 CAP PO Q2W 
SUPPLEMENT  (Reported)
Hydrochlorothiazide 12.5 MG TABLET   1 TAB PO DAILY BP  (Reported)
Ibuprofen 200 MG TABLET   4 TAB PO BID PRN PAIN  (Reported)
Insulin Detemir (Levemir) 100 UNIT/ML VIAL   18 UNITS SC BID DM  (Reported)
Insulin Lispro (Humalog Kwikpen U-100) 100 UNIT/ML INSULN.PEN   0 SC TIDAC DM  (
Reported)
     please take one units of Humalog for each 5 g of carbohydrate
     He states that dose
      prior to each meal
Losartan Potassium 25 MG TABLET   1 TAB PO DAILY BP  (Reported)
Omega-3 Fatty Acids (Omega-3) (Unknown Strength) CAPSULE   (Unknown Dose) PO 
DAILY SUPPLEMENT  (Reported)
Omeprazole 40 MG CAPSULE.DR   1 CAP PO DAILY GI  (Reported)
Pregabalin (Lyrica) 150 MG CAPSULE   1 CAP PO DAILY NERVE PAIN  (Reported)
 
Triage Nurses Notes Reviewed? yes
Onset: Gradual
Duration: hour(s):
Timing: recent history
Injury Environment: snf
Severity: mild
Modifying Factors:
Improves With: rest. 
Associated Symptoms: "high blood sugar"
HPI:
36 yo gentleman, h/o diabetes,
brought from [facility] by ambulance for "high blood sugar."
 
The medics share that his sugar was in the high 300's, that he hasn't taken his 
insulin for the past 1-2 days.
 
He shares that he is uncertain of his insulin dose, because his wife administers
it.  He usually has very high sugars in the 300's-500's, but that also it is 
sometimes in the 100's. 
 
He notes no nausea, vomiting, diarrhea, dyspnea, chest pain.  He is otherwise 
well. 
 
Past History
 
Medical History
Any Pertinent Medical History? see below for history
Neurological: NONE, migraine
EENT: NONE
Cardiovascular: hypertension, hyperlipidemia
Respiratory: NONE
Gastrointestinal: GERD, irritable bowel syndrome
Hepatic: NONE
Renal: NONE
Musculoskeletal: NONE
Psychiatric: anxiety, depression, ADHD, probable intermittent explosive disorder
, remote history of alcohol use disorder, history of cannabis use disorder,
Endocrine: DM type 1
Blood Disorders: NONE
Cancer(s): NONE
GYN/Reproductive: NONE
History of MRSA: No
History of VRE: No
History of CDIFF: No
 
Surgical History
Surgical History: non-contributory
 
Psychosocial History
Who do you live with Spouse
Services at Home None
What is your primary language English
 
Family History
Family History, If Any:
FATHER, , Age 50-60; Cause: Cancer.
 
Hx Contributory? No
 
Review of Systems
 
Review of Systems
Constitutional:
Reports: no symptoms. 
EENTM:
Reports: no symptoms. 
Respiratory:
Reports: no symptoms. 
Cardiovascular:
Reports: no symptoms. 
GI:
Reports: no symptoms. 
Genitourinary:
Reports: no symptoms. 
Musculoskeletal:
Reports: no symptoms. 
Skin:
Reports: no symptoms. 
Neurological/Psychological:
Reports: no symptoms. 
Hematologic/Endocrine:
Reports: no symptoms. 
Immunologic/Allergic:
Reports: no symptoms. 
All Other Systems: Reviewed and Negative
 
Physical Exam
 
Physical Exam
General Appearance: well developed/nourished, no apparent distress
Head: atraumatic, normal appearance
Eyes:
Bilateral: normal appearance, PERRL, EOMI. 
Ears, Nose, Throat: normal pharynx, normal ENT inspection
Neck: normal inspection, supple, full range of motion
Respiratory: normal breath sounds, chest non-tender, no respiratory distress, 
quiet respiration, lungs clear
Cardiovascular: regular rate/rhythm
Gastrointestinal: normal bowel sounds, soft, non-tender
Back: normal inspection, normal range of motion
Extremities: normal inspection, normal capillary refill, normal range of motion,
no edema
Neurologic/Psych: no motor/sensory deficits, awake, alert, oriented x 3
Skin: intact, normal color, warm/dry
 
Core Measures
ACS in differential dx? No
CVA/TIA Diagnosis: No
Sepsis Present: No
Sepsis Focused Exam Completed? No
 
Progress
Differential Diagnoses
I considered the following diagnoses in my evaluation of the patient: 
hyerpglycmia vs other... i doubt dka
 
Plan of Care:
 Orders
 
 
Procedure Date/time Status
 
COMPREHENSIVE METABOLIC PANEL 2116 Complete
 
CBC WITHOUT DIFFERENTIAL 2116 Complete
 
ACETONE 2116 Complete
 
 
 Laboratory Tests
 
 
 
18 2141:
Anion Gap 13, Estimated GFR > 60, BUN/Creatinine Ratio 26.7  H, Glucose 481  H, 
Calcium 9.2, Total Bilirubin 0.8, AST 27, ALT 39, Alkaline Phosphatase 107, 
Total Protein 7.1, Albumin 4.2, Globulin 2.9, Albumin/Globulin Ratio 1.4, CBC w 
Diff NO MAN DIFF REQ, RBC 4.81, MCV 83.8, MCH 27.6, RDW 12.2, MPV 7.2  L, Gran %
73.6, Lymphocytes % 20.2  L, Monocytes % 4.5, Eosinophils % 1.3, Basophils % 0.4
, Absolute Granulocytes 5.1, Absolute Lymphocytes 1.4, Absolute Monocytes 0.3, 
Absolute Eosinophils 0.1, Absolute Basophils 0, PUBS MCHC 33.0, Acetone Level 
NEGATIVE
 
Initial ED EKG: none
 
Departure
 
Departure
Disposition: HOME OR SELF CARE
Condition: Stable
Clinical Impression
Primary Impression: Hyperglycemia
Referrals:
Mini CULVER,Flynn LEWIS (PCP/Family)
 
Departure Forms:
Customer Survey
General Discharge Information
Comments
18, 23:24... pt with improved glucose in low 200's... usual levemir dose is
18 bid... will give 12 units presently... advised close follow up. 
 
Critical Care Note
 
Critical Care Note
Critical Care Time: non-applicable

## 2018-05-12 NOTE — CONS- ENDOCRINOLOGY
General Information and HPI
 
Consulting Request
Date of Consult: 18
Requested By: medical team
Reason for Consult:
DKA
Source of Information: patient, old records
Exam Limitations: no limitations
History of Present Illness:
This 38-year-old male has known history of type 1 diabetes.  He is often 
noncompliant on his diabetic regimen at home.  He was bailed out of [facility] by his 
wife on last Friday.  He was staying at his mother's house and then had a fight 
with his mother.  He left her home and left the insulin at his mother's house 
and did not take any insulin over the past few days.  He developed nausea and 
vomiting.  He presents with a sugar of 812 creatinine 1.6 potassium 7.5 sodium 
137 chloride 95 CO2 6 anion gap 36 lactic acid 3.9 serum acetonepositive 1-16.
 
The patient has received 4 L of IV normal saline and has been started on an 
insulin drip at 8 units an hour.  His repeat labs show creatinine 1.5 potassium 
5.8 CO2 less than 5.
 
Allergies/Medications
Allergies:
Coded Allergies:
Penicillins (ANAPHYLAXIS 18)
aspirin (ANAPHYLAXIS 18)
 
Home Med List:
Acetaminophen 500 MG TABLET   3 TAB PO PRN PAIN  (Reported)
Albuterol Sulfate (Ventolin Hfa) 90 MCG HFA.AER.AD   2 PUF INH Q4-6 PRN PRN 
SHORTNESS OF BREATH
Budesonide (Pulmicort Flexhaler) 180 MCG AER.POW.BA   2 PUF INH BID ASTHMA  (
Reported)
Cyclobenzaprine HCl 10 MG TABLET   1 TAB PO TID PRN MUSCLE SPASMS  (Reported)
Dicyclomine HCl 10 MG CAPSULE   1 CAP PO TID PRN GI  (Reported)
Ergocalciferol (Vitamin D2) (Vitamin D2) 50,000 UNIT CAPSULE   1 CAP PO Q2W 
SUPPLEMENT  (Reported)
Ibuprofen 200 MG TABLET   4 TAB PO BID PRN PAIN  (Reported)
Insulin Detemir (Levemir) 100 UNIT/ML VIAL   24 UNITS SC BID DM  (Reported)
Insulin Lispro (Humalog Kwikpen U-100) 100 UNIT/ML INSULN.PEN   0 SC TIDAC DM  (
Reported)
     please take one units of Humalog for each 5 g of carbohydrate
     He states that dose
      prior to each meal
Losartan Potassium 25 MG TABLET   1 TAB PO DAILY BP  (Reported)
Omega-3 Acid Ethyl Esters 1 GRAM CAPSULE   2 CAP PO DAILY CHOLESTEROL  (Reported
)
Omeprazole 40 MG CAPSULE.   1 CAP PO DAILY GI  (Reported)
Pregabalin (Lyrica) 150 MG CAPSULE   1 CAP PO DAILY NERVE PAIN  (Reported)
 
 
Review of Systems
 
Review of Systems
Constitutional:
Denies: chills, fever. 
Cardiovascular:
Denies: chest pain. 
Respiratory:
Reports: short of breath. 
GI:
Denies: nausea, vomiting. 
Skin:
Reports: no symptoms.  Denies: rash. 
 
Past History
 
Travel History
Traveled to [place] past 21 day No
 
Medical History
Neurological: migraine
EENT: NONE
Cardiovascular: hypertension, hyperlipidemia
Respiratory: NONE
Gastrointestinal: GERD, irritable bowel syndrome
Hepatic: NONE
Renal: NONE
Musculoskeletal: NONE
Psychiatric: anxiety, depression, ADHD, probable intermittent explosive disorder
, remote history of alcohol use disorder, history of cannabis use disorder,
Endocrine: DM type 1
Blood Disorders: NONE
Cancer(s): NONE
GYN/Reproductive: NONE
 
Surgical History
Surgical History: non-contributory
 
Family History
Relations & Conditions If Any:
FATHER, , Age 50-60; Cause: Cancer.
 
 
Psychosocial History
Who Do You Live With? spouse, child
Services at Home: None
Primary Language: English
ETOH Use: denies use
Illicit Drug Use: denies illicit drug use
 
Functional Ability
ADLs
Independent: dressing, eating, toileting, bathing. 
Ambulation: independent
IADLs
Independent: shopping, housework, finances, food prep, telephone, transportation
, medication admin. 
 
Exam & Diagnostic Data
Last 24 Hrs of Vital Signs/I&O
 Vital Signs
 
 
Date Time Temp Pulse Resp B/P B/P Pulse O2 O2 Flow FiO2
 
     Mean Ox Delivery Rate 
 
 1631 97.8 120 18 112/56  100 Room Air  
 
05/ 1509 96.0 124 24 126/58  100 Room Air  
 
05/12 1506       Room Air  
 
05/12 1320 96.5 110 18   98 Room Air Room Air 
 
 
 Intake & Output
 
 
  1600 05/12 0800 05/12 0000
 
Intake Total   
 
Output Total   
 
Balance   
 
    
 
Patient 230 lb  
 
Weight   
 
Weight Reported by Patient  
 
Measurement   
 
Method   
 
 
 Vital Signs
 
 
Date Time Temp Pulse Resp B/P B/P Pulse O2 O2 Flow FiO2
 
     Mean Ox Delivery Rate 
 
 1631 97.8 120 18 112/56  100 Room Air  
 
05/12 1509 96.0 124 24 126/58  100 Room Air  
 
05/12 1506       Room Air  
 
05/12 1320 96.5 110 18   98 Room Air Room Air 
 
 
 Intake & Output
 
 
 05/12 1600 05/12 0800  0000
 
Intake Total   
 
Output Total   
 
Balance   
 
    
 
Patient 230 lb  
 
Weight   
 
Weight Reported by Patient  
 
Measurement   
 
Method   
 
 
 
 
Physical Exam
General Appearance: alert, awake
Head: normal appearance
Eyes:
Bilateral: normal appearance. 
Neck: normal inspection
Respiratory: normal breath sounds
Cardiovascular: regular rate/rhythm
Gastrointestinal: normal bowel sounds
Labs/Rayshawn Results:
 Laboratory Tests
 
 
 
 
 1717 1715 1715 1455
 
Blood Gas    
 
  pH (7.35 - 7.45 PH)    7.09 *L
 
  pCO2 (35 - 45 TORR)    19  L
 
  pO2 (80 - 100 TORR)    115  H
 
  HCO3 (21 - 28 MEQ/L)    6  L
 
  ABG O2 Sat (Measured) (>96.0 %)    96.0
 
  Carboxyhemoglobin (1.5 - 5.0 %)    0.5  L
 
  O2 Concentration %    RA
 
  O2 Delivery Method    RA
 
Chemistry    
 
  Sodium (137 - 145 mmol/L) Cancelled  145 
 
  Potassium (3.5 - 5.1 mmol/L) Cancelled  5.8  H 
 
  Chloride (98 - 107 mmol/L) Cancelled  104 
 
  Carbon Dioxide (22 - 30 mmol/L) Cancelled  < 5.0 *L 
 
  Anion Gap (5 - 16) Cancelled  36  H 
 
  BUN (9 - 20 mg/dL) Cancelled  39  H 
 
  Creatinine (0.7 - 1.2 mg/dL) Cancelled  1.5  H 
 
  Estimated GFR (>60 ml/min)   53  L 
 
  BUN/Creatinine Ratio (7 - 25 %) Cancelled  26.0  H 
 
  Glucose Cancelled   
 
  Lactic Acid (0.7 - 2.1 mmol/L)  2.6  H  
 
  Calcium Cancelled   
 
Miscellaneous    
 
  Phlebotomy Draw Site    RIGHT RADIAL
 
 
 
 
 
 
 1358
 
Chemistry 
 
  Sodium (137 - 145 mmol/L) 137
 
  Potassium (3.5 - 5.1 mmol/L) 7.5 *H
 
  Chloride (98 - 107 mmol/L) 95  L
 
  Carbon Dioxide (22 - 30 mmol/L) 6 *L
 
  Anion Gap (5 - 16) 36  H
 
  BUN (9 - 20 mg/dL) 38  H
 
  Creatinine (0.7 - 1.2 mg/dL) 1.6  H
 
  Estimated GFR (>60 ml/min) 49  L
 
  BUN/Creatinine Ratio (7 - 25 %) 23.8
 
  Glucose (65 - 99 mg/dL) 812 *H
 
  Serum Osmolality (285 - 295 MOSM/KG) 354  H
 
  Lactic Acid (0.7 - 2.1 mmol/L) 3.9  H
 
  Calcium (8.4 - 10.2 mg/dL) 9.6
 
  Magnesium (1.6 - 2.3 mg/dL) 2.4  H
 
  Total Bilirubin (0.2 - 1.3 mg/dL) 0.7
 
  AST (17 - 59 U/L) 20
 
  ALT (21 - 72 U/L) 26
 
  Alkaline Phosphatase (< 127 U/L) 129  H
 
  Troponin I (<0.11 ng/ml) < 0.01
 
  Total Protein (6.3 - 8.2 g/dL) 7.8
 
  Albumin (3.5 - 5.0 g/dL) 5.2  H
 
  Globulin (1.9 - 4.2 gm/dL) 2.6
 
  Albumin/Globulin Ratio (1.1 - 2.2 %) 2.0
 
  Lipase (23 - 300 U/L) 32
 
Hematology 
 
  CBC w Diff MAN DIFF ORDERED
 
  WBC (4.8 - 10.8 /CUMM) 20.3  H
 
  RBC (4.70 - 6.10 /CUMM) 5.12
 
  Hgb (14.0 - 18.0 G/DL) 14.4
 
  Hct (42 - 52 %) 43.5
 
  MCV (80.0 - 94.0 FL) 85.1
 
  MCH (27.0 - 31.0 PG) 28.1
 
  MCHC (33.0 - 37.0 G/DL) 33.0
 
  RDW (11.5 - 14.5 %) 12.6
 
  Plt Count (130 - 400 /CUMM) 405  H
 
  MPV (7.4 - 10.4 FL) 8.2
 
  Gran % (42.2 - 75.2 %) 93.5  H
 
  Lymphocytes % (20.5 - 51.1 %) 4.0  L
 
  Monocytes % (1.7 - 9.3 %) 2.4
 
  Eosinophils % (0 - 5 %) 0
 
  Basophils % (0.0 - 2.0 %) 0.1
 
  Absolute Granulocytes (1.4 - 6.5 /CUMM) 19.0  H
 
  Absolute Lymphocytes (1.2 - 3.4 /CUMM) 0.8  L
 
  Absolute Monocytes (0.10 - 0.60 /CUMM) 0.5
 
  Absolute Eosinophils (0.0 - 0.7 /CUMM) 0
 
  Absolute Basophils (0.0 - 0.2 /CUMM) 0
 
  Platelet Estimate (ADEQUATE) VERIFIED BY SMEAR
 
  Normocytic RBCs VERIFIED
 
  Normochromic RBCs VERIFIED
 
Toxicology 
 
  Acetone Level (NEGATIVE) POSITIVE AT 1:16 DIL
 
 
 
 
Assessment/Plan
Assessment/Plan
This 38-year-old male with a known history of type 1 diabetes presents with 
diabetic ketoacidosis secondary to discontinuing his insulin at home after 
fighting with his mother.  He was in [facility] up to Friday which is 2 days ago.  His
wife bailed him out.
 
The patient presents with evidence of diabetic ketoacidosis with a high sugar 
812 creatinine 1.6 anion gap 36 lactic acid 3.9 acetone +1-16.  His white count 
is also elevated which is seen frequently in ketoacidosis.
 
He has received 4 L of normal saline and is now on normal saline at 150 cc/h.  
His potassium has come down to 5.8 and creatinine 1.5.  However CO2 was less 
than 5.  His serum sodium is 145.
 
Suggest switch to the patient's IV fluids to half-normal saline at 150 cc/h.  We
should continue the insulin drip at 8 U/h.  When his glucose gets up to 250 we 
should change to D5 half-normal saline and continue the insulin drip.  Once his 
sugar comes down we should try to maintain his blood sugar with the insulin drip
in the 160-180 range.  We should continue his insulin drip during the night and 
will not switch him to subcu insulin until at least tomorrow morning if his labs
are improved.  We should monitor his BUN/creatinine electrolytes anion gap and 
lactic acid every 2-3 hours until stable and then every 4 hours after that.
 
Once the patient stops vomiting we can allow him to drink water by mouth.  We 
should also put him on a proton pump inhibitor.
 
 
Consult Acknowledgment
- Thank you for your consult request.

## 2018-05-12 NOTE — HISTORY & PHYSICAL
Сергей CULVER,[facility] 18 1543:
General Information and HPI
MD Statement:
I have seen and personally examined NEO CAMACHO and documented this H&P.
 
The patient is a 38 year old M who presented with a patient stated chief 
complaint of abdominal pain.
 
Source of Information: patient
Exam Limitations: no limitations
History of Present Illness:
This is a 38-year-old gentleman with past medical history significant for 
hypertension, hyperlipidemia, type 1 diabetes complicated by neuropathy and 
multiple episodes of DKA admission at [facility], presents with complaints of 1 day
history of abdominal pain, nausea and non bloody vomitus. Onset was around 1 AM 
this morning .  He does deny any fever, chills, recent URI, diarrhea, dysuria, 
sick contacts or recent travel. Regarding his insulin therapy, patient reports 
that he has not taken his Levemir for 4 days and humalog insulin for 2 days. He 
states that he was staying with his mom, had a quarrel with his mom and left the
house without getting his insulin supplies.
At the ED, he was found to have a glucose level of 812, each of 7.0, bicarbonate
of 6 and positive urine ketones, his potassium was also noted to be elevated 
with a level of 7.  He was started on 10 units insulin bolus and calcium 
gluconate for hyperkalemia and received about 4 L bolus of normal saline.  When 
seen by the medical team he was on an insulin infusion drip at a rate of 8 units
per hour. 
Patient reports a history of neuropathy and is on lyrica, he does deny any 
kidney problem or retinopathy.
 
Allergies/Medications
Allergies:
Coded Allergies:
Penicillins (ANAPHYLAXIS 18)
aspirin (ANAPHYLAXIS 18)
 
Home Med list
Acetaminophen 500 MG TABLET   3 TAB PO PRN PAIN  (Reported)
Albuterol Sulfate (Ventolin Hfa) 90 MCG HFA.AER.AD   2 PUF INH Q4-6 PRN PRN 
SHORTNESS OF BREATH
Budesonide (Pulmicort Flexhaler) 180 MCG AER.POW.BA   2 PUF INH BID ASTHMA  (
Reported)
Cyclobenzaprine HCl 10 MG TABLET   1 TAB PO TID PRN MUSCLE SPASMS  (Reported)
Dicyclomine HCl 10 MG CAPSULE   1 CAP PO TID PRN GI  (Reported)
Ergocalciferol (Vitamin D2) (Vitamin D2) 50,000 UNIT CAPSULE   1 CAP PO Q2W 
SUPPLEMENT  (Reported)
Ibuprofen 200 MG TABLET   4 TAB PO BID PRN PAIN  (Reported)
Insulin Detemir (Levemir) 100 UNIT/ML VIAL   24 UNITS SC BID DM  (Reported)
Insulin Lispro (Humalog Kwikpen U-100) 100 UNIT/ML INSULN.PEN   0 SC TIDAC DM  (
Reported)
     please take one units of Humalog for each 5 g of carbohydrate
     He states that dose
      prior to each meal
Losartan Potassium 25 MG TABLET   1 TAB PO DAILY BP  (Reported)
Omega-3 Acid Ethyl Esters 1 GRAM CAPSULE   2 CAP PO DAILY CHOLESTEROL  (Reported
)
Omeprazole 40 MG CAPSULE.DR   1 CAP PO DAILY GI  (Reported)
Pregabalin (Lyrica) 150 MG CAPSULE   1 CAP PO DAILY NERVE PAIN  (Reported)
 
 
Past History
 
Travel History
Traveled to [place] past 21 day No
 
Medical History
Neurological: migraine
EENT: NONE
Cardiovascular: hypertension, hyperlipidemia
Respiratory: NONE
Gastrointestinal: GERD, irritable bowel syndrome
Hepatic: NONE
Renal: NONE
Musculoskeletal: NONE
Psychiatric: anxiety, depression, ADHD, probable intermittent explosive disorder
, remote history of alcohol use disorder, history of cannabis use disorder,
Endocrine: DM type 1
Blood Disorders: NONE
Cancer(s): NONE
GYN/Reproductive: NONE
History of MRSA: No
History of VRE: No
History of CDIFF: No
 
Surgical History
Surgical History: non-contributory
 
Past Family/Social History
 
Family History
Relations & Conditions if any
FATHER, , Age 50-60; Cause: Cancer.
 
 
Psychosocial History
Who Do You Live With? spouse, child
Services at Home: None
Primary Language: English
ETOH Use: denies use
Illicit Drug Use: denies illicit drug use
 
Functional Ability
ADLs
Independent: dressing, eating, toileting, bathing. 
Ambulation: independent
IADLs
Independent: shopping, housework, finances, food prep, telephone, transportation
, medication admin. 
 
Sexual History
Past Sexual History Unobtainable at this time
 
Review of Systems
 
Review of Systems
Constitutional:
Reports: see HPI. 
EENTM:
Reports: no symptoms. 
Cardiovascular:
Reports: no symptoms. 
Respiratory:
Reports: no symptoms. 
GI:
Reports: see HPI. 
Genitourinary:
Reports: no symptoms. 
Musculoskeletal:
Reports: no symptoms. 
Skin:
Reports: no symptoms. 
Neurological/Psychological:
Reports: no symptoms. 
Hematologic/Endocrine:
Reports: no symptoms. 
Immunologic/Allergic:
Reports: no symptoms. 
All Other Systems: Reviewed and Negative
 
Exam & Diagnostic Data
Last 24 Hrs of Vital Signs/I&O
 Vital Signs
 
 
Date Time Temp Pulse Resp B/P B/P Pulse O2 O2 Flow FiO2
 
     Mean Ox Delivery Rate 
 
 1631 97.8 120 18 112/56  100 Room Air  
 
 1509 96.0 124 24 126/58  100 Room Air  
 
 1506       Room Air  
 
 1320 96.5 110 18   98 Room Air Room Air 
 
 
 Intake & Output
 
 
  1600  0800  0000
 
Intake Total   
 
Output Total   
 
Balance   
 
    
 
Patient 104.326 kg  
 
Weight   
 
Weight Reported by Patient  
 
Measurement   
 
Method   
 
 
 
 
Physical Exam
General Appearance Alert, Oriented X3, Cooperative
Skin No Significant Lesion
Skin Temp/Moisture Exam: Warm/Dry
Sepsis Skin Exam (color): Normal for Ethnicity
HEENT Atraumatic, PERRLA, dry mucosa
Neck No JVD
Lymphatic Cervical nl
Cardiovascular Regular Rate, Normal S1, Normal S2
Lungs Clear to Auscultation, Normal Air Movement
Abdomen Soft, mildly distended
Neurological Normal Speech, Strength at 5/5 X4 Ext, Normal Tone, Sensation 
Intact
Extremities No Clubbing, No Edema
Vascular Normal Pulses, Pulses Symmetrical
Last 24 Hrs of Labs/[place]:
 Laboratory Tests
 
18 1715:
Lactic Acid Pending
 
18 1715:
Sodium Pending, Potassium Pending, Chloride Pending, Carbon Dioxide Pending, 
Anion Gap Pending, BUN Pending, Creatinine Pending, BUN/Creatinine Ratio Pending
 
18 1455:
pH 7.09 *L, pCO2 19  L, pO2 115  H, HCO3 6  L, ABG O2 Sat (Measured) 96.0, 
Carboxyhemoglobin 0.5  L, O2 Concentration % RA, O2 Delivery Method RA, 
Phlebotomy Draw Site RIGHT RADIAL
 
18 1358:
Anion Gap 36  H, Estimated GFR 49  L, BUN/Creatinine Ratio 23.8, Glucose 812 *H,
Serum Osmolality 354  H, Lactic Acid 3.9  H, Calcium 9.6, Magnesium 2.4  H, 
Total Bilirubin 0.7, AST 20, ALT 26, Alkaline Phosphatase 129  H, Troponin I < 
0.01, Total Protein 7.8, Albumin 5.2  H, Globulin 2.6, Albumin/Globulin Ratio 
2.0, Lipase 32, CBC w Diff MAN DIFF ORDERED, RBC 5.12, MCV 85.1, MCH 28.1, MCHC 
33.0, RDW 12.6, MPV 8.2, Gran % 93.5  H, Lymphocytes % 4.0  L, Monocytes % 2.4, 
Eosinophils % 0, Basophils % 0.1, Absolute Granulocytes 19.0  H, Absolute 
Lymphocytes 0.8  L, Absolute Monocytes 0.5, Absolute Eosinophils 0, Absolute 
Basophils 0, Platelet Estimate VERIFIED BY SMEAR, Normocytic RBCs VERIFIED, 
Normochromic RBCs VERIFIED, Acetone Level POSITIVE AT 1:16 DIL
 Microbiology
 1629  UPPER RESP: Surveillance Culture - ORD
 
 
 
Diagnostic Data
EKG Results
wide QRS
Prolonged QTc
 
Assessment/Plan
Assessment:
This is a 38-year-old gentleman with a history of type 1 diabetes who has not 
taken his insulin for 2 days presents with one-day history of abdominal symptoms
of pain, nausea and vomiting in the setting of laboratory workup significant for
glucose level of 800, bicarbonate of 6, positive ketones, and a pH of 7.01. 
 
Impression
* Acute abdominal pain associated with nausea and vomiting.  Most likely 
secondary to diabetes acidosis as abdominal pain and vomiting is the most common
clinical presentation in patients who are in DKA.  Patient is afebrile and there
is low suspicion for infectious etiology, even though there is leukocytosis this
is most likely secondary to inflammatory responses from the DKA rather than 
infection.
* Diabetes ketoacidosis (severe) as evident by elevated sugars of 800, acidic pH
of 7.0, and positive serum ketones.  Based on patient's history, his cause of 
DKA is most likely secondary to noncompliance. Acute infection is also one of 
the common cause of DKA however there is low suspicion in this patient.
* Anion Gap metabolic acidosis. 2/2 to DKA and lactic acidosis.
* Hyperkalemia with EKG changes. Multifactorial, 2/2 to significant acidosis  vs
Losartan use.
* CHAZ. Most likely  2/2 to pre-renal etiology, NSAID use probably contributory.
* Leukocytosis. Most likely 2/2 to DKA, no clinical suspicion for infection.
* Lactic acidosis. Most likely 2/2 to severe dehydration.
 
Plan
Admit to ICU
Nothing by mouth
s/p 4 L NS, continue with  IVF  hydration
Continue insulin drip at 8 units per hour and adjust accordingly targeting a 
rate of 50-80 mg/dL reduction per hour and not to exceed 100mg/dl per hour.
Accu-Cheks every hour
Tigan for nausea (QTc prolongation precludes Zofran use).
BEP every 3-4 hours for now and monitor for potassium, bicarbonate, and sodium (
The fact that his sodium is 137 in the context of blood glucose of over 800, 
signifies significant dehydration. We will switch to 1/2 Normal saline to 
provide free water and prevent hypernatremia.).
Will repeat EKG to assess for resolution of prior abnormalities (widened QRS, 
prolong QTc)
Will switch to D5 solution once sugars are around 250mg/dl
Once bicarb is ~18, we may consider switching to subcutaneous insulin, we will 
allow 1/2 hr to 1 hr overlap between sc and drip. 
In the context of hyperkalemia and CHAZ, hold Losartan,, consider amlodipine if 
Bp is elevated.
Social work consult (pt has complex family dynamics, just released from prison and
reports not having money for food).
 
As Ranked By This Provider
Problem List:
 1. DKA (diabetic ketoacidoses)
 
 2. Acute renal failure
 
 3. Hyperkalemia
 
 4. Lactic acidosis
 
 5. Abnormal EKG
 
 
Core Measures/Misc ()
 
Acute Coronary Syndrome
ACS Diagnosis: No
 
Congestive Heart Failure
Congestive Heart Failure Diagnosis No
 
Cerebrovascular Accident
CVA/TIA Diagnosis: No
 
VTE (View Protocol)
VTE Risk Factors Acute Medical Illness
No Mechanical VTE Prophylaxis d/t N/A MechProphylax Ordered
No VTE Pharm Prophylaxis d/t NA PharmProphylax ordered
 
Sepsis (View protocol)
Sepsis Present: No
 
 
Levar CULVER,[place] 18 1105:
Attending MD Review Statement
 
Attending Statement
Attending MD Statement: examined this patient, discuss w/resident/PA/NP, agreed 
w/resident/PA/NP, reviewed EMR data (avail), discussed with nursing, discussed 
with case mgmt, reviewed images, amended to note
Attending Assessment/Plan:
See my addendum.

## 2018-05-12 NOTE — CONS- NEPHROLOGY
General Information and HPI
 
Consulting Request
Date of Consult: 18
Requested By:
Dr. Tucker
 
Reason for Consult:
Hyperkalemia
Source of Information: patient, old records
Exam Limitations: clinical condition
History of Present Illness:
Pt is a 38-year-old male with a past medical history most significant for type 1
diabetes, baseline normal renal function with a creatinine approximately 1 -non-
proteinuric hypertension on losartan, peripheral neuropathy on Lyrica as well as
nonsteroidals who presents with abdominal pain.
 
Patient ran out of his insulin including his supplies a couple days ago.  He 
since had abdominal pain, nausea, vomiting, some diarrhea, and polyuria.
 
In the ED, /58.  Labs notable for SCr 1.6, Na 137 with glucose 812, K 7.5,
bicarb 6, lactate 3.9, blood gas 7.09/115, +acetone, no UA.
 
Pt reports feeling very dry, unable to take in much PO.  That being said he's 
still been taking his ibuprofen 800mg BID and losartan.  Has gotten approx 4L 
IVF and started on insulin.  Plans to admit to ICU.  No repeat labs yet.
 
Allergies/Medications
Allergies:
Coded Allergies:
Penicillins (ANAPHYLAXIS 18)
aspirin (ANAPHYLAXIS 18)
 
Home Med List:
Acetaminophen 500 MG TABLET   3 TAB PO PRN PAIN  (Reported)
Albuterol Sulfate (Ventolin Hfa) 90 MCG HFA.AER.AD   2 PUF INH Q4-6 PRN PRN 
SHORTNESS OF BREATH
Budesonide (Pulmicort Flexhaler) 180 MCG AER.POW.BA   2 PUF INH BID ASTHMA  (
Reported)
Cyclobenzaprine HCl 10 MG TABLET   1 TAB PO TID PRN MUSCLE SPASMS  (Reported)
Dicyclomine HCl 10 MG CAPSULE   1 CAP PO TID PRN GI  (Reported)
Ergocalciferol (Vitamin D2) (Vitamin D2) 50,000 UNIT CAPSULE   1 CAP PO Q2W 
SUPPLEMENT  (Reported)
Ibuprofen 200 MG TABLET   4 TAB PO BID PRN PAIN  (Reported)
Insulin Detemir (Levemir) 100 UNIT/ML VIAL   24 UNITS SC BID DM  (Reported)
Insulin Lispro (Humalog Kwikpen U-100) 100 UNIT/ML INSULN.PEN   0 SC TIDAC DM  (
Reported)
     please take one units of Humalog for each 5 g of carbohydrate
     He states that dose
      prior to each meal
Losartan Potassium 25 MG TABLET   1 TAB PO DAILY BP  (Reported)
Omega-3 Acid Ethyl Esters 1 GRAM CAPSULE   2 CAP PO DAILY CHOLESTEROL  (Reported
)
Omeprazole 40 MG CAPSULE.DR   1 CAP PO DAILY GI  (Reported)
Pregabalin (Lyrica) 150 MG CAPSULE   1 CAP PO DAILY NERVE PAIN  (Reported)
 
Current Medications:
 Current Medications
 
 
  Sig/Rubina Start time  Last
 
Medication Dose Route Stop Time Status Admin
 
Calcium Gluconate 0 .STK-MED ONE / 1457 DC 
 
  IV   
 
Calcium Gluconate 1 GM ONCE ONE / 1445 DC 05/12
 
Sodium Chloride 100 ML IV 05/ 1544  1456
 
Insulin Human Regular 10 UNITS ONCE ONE / 1445 DC 05/12
 
  IV 05/ 1446  1456
 
Insulin Human Regular 100 UNIT ONCE ONE / 1445 DC 05/
 
Sodium Chloride 100 ML IV 05/ 1446  1619
 
Metoclopramide HCl 0 .STK-MED ONE 05/ 1505 DC 
 
  .ROUTE   
 
Metoclopramide HCl 10 MG ONCE ONE / 1500 DC 05/
 
  IV 05/ 1501  1501
 
Ondansetron HCl 0 .STK-MED ONE / 1352 DC 
 
  .ROUTE   
 
Ondansetron HCl 4 MG ONCE ONE / 1330 DC 05/12
 
  IV 05/12 1331  1347
 
Pantoprazole Sodium 0 .STK-MED ONE 05/ 1506 DC 
 
  IV   
 
Pantoprazole Sodium 40 MG ONCE ONE / 1500 DC 05/12
 
  IV 05/12 1501  1501
 
Sodium Chloride 1,000 ML BOLUS ONE / 1515 DC 
 
  IV 05/12 1614  
 
Sodium Chloride 1,000 ML BOLUS ONE / 1515 DC 
 
  IV 05/12 1614  
 
Sodium Chloride 1,000 ML BOLUS ONE  1445 DC 05/12
 
  IV 05/12 1544  1456
 
Sodium Chloride 1,000 ML BOLUS ONE 05/ 1345 DC 05/12
 
  IV 05/12 1444  1352
 
Sodium Chloride 1,000 ML BOLUS ONE / 1330 DC 05/12
 
  IV 05/12 1429  1347
 
 
 
 
Review of Systems
Review of Systems:
Complete 14 point ROS neg except as per HPI
 
Past History
 
Travel History
Traveled to [place] past 21 day No
 
Medical History
Neurological: migraine
EENT: NONE
Cardiovascular: hypertension, hyperlipidemia
Respiratory: NONE
Gastrointestinal: GERD, irritable bowel syndrome
Hepatic: NONE
Renal: NONE
Musculoskeletal: NONE
Psychiatric: anxiety, depression, ADHD, probable intermittent explosive disorder
, remote history of alcohol use disorder, history of cannabis use disorder,
Endocrine: DM type 1
Blood Disorders: NONE
Cancer(s): NONE
GYN/Reproductive: NONE
 
Surgical History
Surgical History: non-contributory
 
Family History
Relations & Conditions If Any:
FATHER, , Age 50-60; Cause: Cancer.
 
 
Psychosocial History
Who Do You Live With? spouse, child
Services at Home: None
Primary Language: English
ETOH Use: denies use
Illicit Drug Use: denies illicit drug use
 
Functional Ability
ADLs
Independent: dressing, eating, toileting, bathing. 
Ambulation: independent
IADLs
Independent: shopping, housework, finances, food prep, telephone, transportation
, medication admin. 
 
Exam & Diagnostic Data
Vital Signs and I&O
Vital Signs
 
 
Date Time Temp Pulse Resp B/P B/P Pulse O2 O2 Flow FiO2
 
     Mean Ox Delivery Rate 
 
 1631 97.8 120 18 112/56  100 Room Air  
 
 1509 96.0 124 24 126/58  100 Room Air  
 
 1506       Room Air  
 
 1320 96.5 110 18   98 Room Air Room Air 
 
 
 Intake & Output
 
 
  1600  0400  1600  0400 05/10 1600 05/10 0400
 
Intake Total      
 
Output Total      
 
Balance      
 
       
 
Patient 230 lb     
 
Weight      
 
Weight Reported by Patient     
 
Measurement      
 
Method      
 
 
 
Physical Exam:
Gen - mildly slurred speech, ill appearing
Head - NCAT
Eyes - anicteric sclera, EOMI
Neck - supple, no LAD
CV - RRR, no m/r/g
Chest - clear, no w/r/r
Abd - soft, NTND
Upper ext - warm, no edema
Lower ext - warm, no edema
Skin - no rash or jaundice
Neuro - AOX3, mildly slurred speech but otherwise nonfocal
 
Results
Pertinent Lab Results:
 Laboratory Tests
 
 
 
 
 1715 1715 1455
 
Blood Gas   
 
  pH (7.35 - 7.45 PH)   7.09 *L
 
  pCO2 (35 - 45 TORR)   19  L
 
  pO2 (80 - 100 TORR)   115  H
 
  HCO3 (21 - 28 MEQ/L)   6  L
 
  ABG O2 Sat (Measured) (>96.0 %)   96.0
 
  Carboxyhemoglobin (1.5 - 5.0 %)   0.5  L
 
  O2 Concentration %   RA
 
  O2 Delivery Method   RA
 
Chemistry   
 
  Sodium  Pending 
 
  Potassium  Pending 
 
  Chloride  Pending 
 
  Carbon Dioxide  Pending 
 
  Anion Gap  Pending 
 
  BUN  Pending 
 
  Creatinine  Pending 
 
  BUN/Creatinine Ratio  Pending 
 
  Lactic Acid Pending  
 
Miscellaneous   
 
  Phlebotomy Draw Site   RIGHT RADIAL
 
 
 
 
 
 
 1358
 
Chemistry 
 
  Sodium (137 - 145 mmol/L) 137
 
  Potassium (3.5 - 5.1 mmol/L) 7.5 *H
 
  Chloride (98 - 107 mmol/L) 95  L
 
  Carbon Dioxide (22 - 30 mmol/L) 6 *L
 
  Anion Gap (5 - 16) 36  H
 
  BUN (9 - 20 mg/dL) 38  H
 
  Creatinine (0.7 - 1.2 mg/dL) 1.6  H
 
  Estimated GFR (>60 ml/min) 49  L
 
  BUN/Creatinine Ratio (7 - 25 %) 23.8
 
  Glucose (65 - 99 mg/dL) 812 *H
 
  Serum Osmolality (285 - 295 MOSM/KG) 354  H
 
  Lactic Acid (0.7 - 2.1 mmol/L) 3.9  H
 
  Calcium (8.4 - 10.2 mg/dL) 9.6
 
  Magnesium (1.6 - 2.3 mg/dL) 2.4  H
 
  Total Bilirubin (0.2 - 1.3 mg/dL) 0.7
 
  AST (17 - 59 U/L) 20
 
  ALT (21 - 72 U/L) 26
 
  Alkaline Phosphatase (< 127 U/L) 129  H
 
  Troponin I (<0.11 ng/ml) < 0.01
 
  Total Protein (6.3 - 8.2 g/dL) 7.8
 
  Albumin (3.5 - 5.0 g/dL) 5.2  H
 
  Globulin (1.9 - 4.2 gm/dL) 2.6
 
  Albumin/Globulin Ratio (1.1 - 2.2 %) 2.0
 
  Lipase (23 - 300 U/L) 32
 
Hematology 
 
  CBC w Diff MAN DIFF ORDERED
 
  WBC (4.8 - 10.8 /CUMM) 20.3  H
 
  RBC (4.70 - 6.10 /CUMM) 5.12
 
  Hgb (14.0 - 18.0 G/DL) 14.4
 
  Hct (42 - 52 %) 43.5
 
  MCV (80.0 - 94.0 FL) 85.1
 
  MCH (27.0 - 31.0 PG) 28.1
 
  MCHC (33.0 - 37.0 G/DL) 33.0
 
  RDW (11.5 - 14.5 %) 12.6
 
  Plt Count (130 - 400 /CUMM) 405  H
 
  MPV (7.4 - 10.4 FL) 8.2
 
  Gran % (42.2 - 75.2 %) 93.5  H
 
  Lymphocytes % (20.5 - 51.1 %) 4.0  L
 
  Monocytes % (1.7 - 9.3 %) 2.4
 
  Eosinophils % (0 - 5 %) 0
 
  Basophils % (0.0 - 2.0 %) 0.1
 
  Absolute Granulocytes (1.4 - 6.5 /CUMM) 19.0  H
 
  Absolute Lymphocytes (1.2 - 3.4 /CUMM) 0.8  L
 
  Absolute Monocytes (0.10 - 0.60 /CUMM) 0.5
 
  Absolute Eosinophils (0.0 - 0.7 /CUMM) 0
 
  Absolute Basophils (0.0 - 0.2 /CUMM) 0
 
  Platelet Estimate (ADEQUATE) VERIFIED BY SMEAR
 
  Normocytic RBCs VERIFIED
 
  Normochromic RBCs VERIFIED
 
Toxicology 
 
  Acetone Level (NEGATIVE) POSITIVE AT 1:16 DIL
 
 
 
Imaging/Other Studies:
None
 
Assessment/Plan
 
Assessment/Recommendations
Assessment:
Hyperglycemia - DKA with positive acetone.
 
Metabolic acidosis - anion gap with appropriate respiratory compensation (PCO2 <
20 is essentially max compensation) - 2/2 DKA.  Needs insulin as definitive 
therapy.
 
Hyperkalemia - 2/2 shift in the setting of severe met acidosis.  Not to mention 
NSAID use as well as being on losartan.  Needs insulin but would not be suprised
if ultimately he is total body K deplete and will need K supplementation 
tomorrow.  If there are EKG changes, would treat with IV calcium gluconate in 
addition to insulin and IVF.  No clear need for dialysis.
 
Dehydration - With a glucose >800, the fact that his Na is 137 suggests severe 
dehydration - will likely need to switch to hypotonic fluid soon.
 
CHAZ - 2/2 volume depletion while on RAAS inhibition and getting NSAID's.
Recommendations:
-Insulin, Insulin, Insulin
-Closely monitoring of sugars as per protocol
-Frequent BMP's to monitor for closing of AGAP as per protocol as well as 
potassium
-Cont to monitor EKG's if K remains elevated
-Would cont IVF as you are - will ultimately need to switch to hypotonic fluid
-Hold losartan
-Pt counselled against NSAID use
 
Please call  with ?'s
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
## 2018-05-13 NOTE — PN- DIABETES
Assessment/Plan Diabetes
Assessment:
The patient feels a little better.  He is still on an insulin drip and IV 
fluids.
 
His most recent labs show glucose 243 sodium 144 potassium 4.5 chloride 111 CO2 
15 anion gap 18 BUN 33 creatinine 1.3.
 
His ketoacidosis appears to be resolving.
 
Plan:
Suggest we repeat his labs now.  If his anion gap is closed and his bicarb is 
above 18 we can switch him to subcutaneous insulin.
 
When ready to switch to subcutaneous insulin I would begin Levemir 15 units 
twice a day the first dose stat.  We should also place him on sliding scale 
NovoLog before meals.  Sliding scale NovoLog before meals should be  give 
4 units NovoLog, 151-200 give 6 units NovoLog, 201-250 give 7 units NovoLog, 251
-300 give 8 units NovoLog, 301-350 give 9 units NovoLog, 351-400 give 10 units 
NovoLog.
 
A separate sliding scale bedtime NovoLog coverage should be written.  Sliding 
scale NovoLog at bedtime should be less than 250 give no insulin, 251-300 give 2
units NovoLog, 301-350 give 3 units NovoLog, 351-400 give 4 units NovoLog.
 
We should order patient a diabetic diet before beginning NovoLog.  1 hour after 
the first dose of NovoLog and first dose of Levemir the insulin drip can be shut
off.  If the patient is able to take diet well we can also change his IV fluids 
to 50 cc an hour and eventually discontinue them completely..
 
Subjective
Subjective:
Feels a little better
 
Review of Systems
Constitutional:
Denies: chills, fever. 
Cardiovascular:
Denies: chest pain. 
Respiratory:
Denies: short of breath. 
Gastrointestinal:
Denies: abdominal pain. 
Skin:
Reports: no symptoms. 
 
Objective
Last 24 Hrs of Vital Signs/I&O
 Vital Signs
 
 
Date Time Temp Pulse Resp B/P B/P Pulse O2 O2 Flow FiO2
 
     Mean Ox Delivery Rate 
 
05/13 0000 100.3 116 18 120/64  100 Room Air Room Air 
 
05/12 2156       Room Air Room Air 
 
05/12 2025 99.7 124 24 130/62  100 Room Air Room Air 
 
05/12 2008 98.6 122 18 121/64  99 Room Air  
 
05/12 1823 98.4 122 16 123/63  100 Room Air  
 
05/12 1631 97.8 120 18 112/56  100 Room Air  
 
05/12 1509 96.0 124 24 126/58  100 Room Air  
 
05/12 1506       Room Air  
 
05/12 1320 96.5 110 18   98 Room Air Room Air 
 
 
 Intake & Output
 
 
 05/13 1600 05/13 0800 05/13 0000
 
Intake Total  1105 308
 
Output Total   550
 
Balance  1105 -242
 
    
 
Intake, IV  1105 308
 
Intake, Oral  0 0
 
Number  1 0
 
Bowel   
 
Movements   
 
Output, Urine   550
 
Patient   215 lb
 
Weight   
 
Weight   Bed scale
 
Measurement   
 
Method   
 
 
 Vital Signs
 
 
Date Time Temp Pulse Resp B/P B/P Pulse O2 O2 Flow FiO2
 
     Mean Ox Delivery Rate 
 
05/13 0000 100.3 116 18 120/64  100 Room Air Room Air 
 
05/12 2156       Room Air Room Air 
 
05/12 2025 99.7 124 24 130/62  100 Room Air Room Air 
 
05/12 2008 98.6 122 18 121/64  99 Room Air  
 
05/12 1823 98.4 122 16 123/63  100 Room Air  
 
05/12 1631 97.8 120 18 112/56  100 Room Air  
 
05/12 1509 96.0 124 24 126/58  100 Room Air  
 
05/12 1506       Room Air  
 
05/12 1320 96.5 110 18   98 Room Air Room Air 
 
 
 Intake & Output
 
 
 05/13 1600 05/13 0800 05/13 0000
 
Intake Total  1105 308
 
Output Total   550
 
Balance  1105 -242
 
    
 
Intake, IV  1105 308
 
Intake, Oral  0 0
 
Number  1 0
 
Bowel   
 
Movements   
 
Output, Urine   550
 
Patient   215 lb
 
Weight   
 
Weight   Bed scale
 
Measurement   
 
Method   
 
 
 
 
Physical Exam
General Appearance: alert, awake, comfortable
Head: normal appearance
Neck: normal inspection
Respiratory: normal breath sounds
Cardiovascular: regular rate/rhythm
Abdomen: normal bowel sounds, soft
Extremities: normal inspection
Current Medications:
 Current Medications
 
 
  Sig/Rubina Start time  Last
 
Medication Dose Route Stop Time Status Admin
 
Albuterol Sulfate 2 PUF Q4P PRN 05/12 2230 AC 
 
  INH   
 
Calcium Gluconate 0 .STK-MED ONE 05/12 1457 DC 
 
  IV   
 
Calcium Gluconate 1 GM ONCE ONE 05/12 1445 DC 05/12
 
Sodium Chloride 100 ML IV 05/12 1544  1456
 
Dextrose/Sodium  1,000 ML Q6H 05/12 2230 AC 05/13
 
Chloride  IV   0605
 
Heparin Sodium  5,000 UNIT Q8 05/12 2200 AC 
 
(Porcine)  SC   
 
Insulin Human Regular 100 UNIT Q24H 05/12 2045 AC 05/12
 
Sodium Chloride 100 ML IV   2233
 
Insulin Human Regular 10 UNITS ONCE ONE 05/12 1445 DC 05/12
 
  IV 05/12 1446  1456
 
Insulin Human Regular 100 UNIT ONCE ONE 05/12 1445 DC 05/12
 
Sodium Chloride 100 ML IV 05/12 1446  1619
 
Metoclopramide HCl 0 .STK-MED ONE 05/12 1505 DC 
 
  .ROUTE   
 
Metoclopramide HCl 10 MG ONCE ONE 05/12 1500 DC 05/12
 
  IV 05/12 1501  1501
 
Ondansetron HCl 0 .STK-MED ONE 05/12 1352 DC 
 
  .ROUTE   
 
Ondansetron HCl 4 MG ONCE ONE 05/12 1330 DC 05/12
 
  IV 05/12 1331  1347
 
Pantoprazole Sodium 40 MG DAILY 05/13 0900 AC 
 
  IV   
 
Pantoprazole Sodium 0 .STK-MED ONE 05/12 1506 DC 
 
  IV   
 
Pantoprazole Sodium 40 MG ONCE ONE 05/12 1500 DC 05/12
 
  IV 05/12 1501  1501
 
Sodium Chloride 1,000 ML .Q6H40M 05/12 1830 DC 05/12
 
  IV   1843
 
Sodium Chloride 1,000 ML ONCE ONE 05/12 1745 CAN 
 
  IV 05/13 0024  
 
Sodium Chloride 1,000 ML BOLUS ONE 05/12 1515 DC 
 
  IV 05/12 1614  
 
Sodium Chloride 1,000 ML BOLUS ONE 05/12 1515 DC 
 
  IV 05/12 1614  
 
Sodium Chloride 1,000 ML BOLUS ONE 05/12 1445 DC 05/12
 
  IV 05/12 1544  1456
 
Sodium Chloride 1,000 ML BOLUS ONE 05/12 1345 DC 05/12
 
  IV 05/12 1444  1352
 
Sodium Chloride 1,000 ML BOLUS ONE 05/12 1330 DC 05/12
 
  IV 05/12 1429  1347
 
Trimethobenzamide HCl 0 .STK-MED ONE 05/12 1854 DC 
 
  IM   
 
Trimethobenzamide HCl 200 MG TID PRN 05/12 1815 AC 05/12
 
  IM   1853
 
 
 
 
Findings
Pertinent Lab/Rayshawn Results:
 Laboratory Tests
 
 
 05/13 05/12 05/12
 
 0410 2310 2035
 
Chemistry   
 
  Sodium (137 - 145 mmol/L) 144 147  H 148  H
 
  Potassium (3.5 - 5.1 mmol/L) 4.5 4.7 4.5
 
  Chloride (98 - 107 mmol/L) 111  H 112  H 110  H
 
  Carbon Dioxide (22 - 30 mmol/L) 15  L 12  L 9 *L
 
  Anion Gap (5 - 16) 18  H 23  H 29  H
 
  BUN (9 - 20 mg/dL) 33  H 36  H 36  H
 
  Creatinine (0.7 - 1.2 mg/dL) 1.3  H 1.5  H 1.5  H
 
  Estimated GFR (>60 ml/min) > 60 53  L 53  L
 
  BUN/Creatinine Ratio (7 - 25 %)  24.0 24.0
 
  Glucose (65 - 99 mg/dL) 243  H  
 
  Lactic Acid (0.7 - 2.1 mmol/L)  1.2 2.2  H
 
  Calcium (8.4 - 10.2 mg/dL) 8.8  
 
  Phosphorus (2.5 - 4.5 mg/dL) 3.0  
 
  Magnesium (1.6 - 2.3 mg/dL) 2.3  
 
  Total Bilirubin (0.2 - 1.3 mg/dL) 0.5  
 
  AST (17 - 59 U/L) 11  L  
 
  ALT (21 - 72 U/L) 33  
 
  Albumin (3.5 - 5.0 g/dL) 3.8  
 
Hematology   
 
  CBC w Diff NO MAN DIFF REQ  
 
  WBC (4.8 - 10.8 /CUMM) 16.8  H  
 
  RBC (4.70 - 6.10 /CUMM) 4.38  L  
 
  Hgb (14.0 - 18.0 G/DL) 12.4  L  
 
  Hct (42 - 52 %) 36.7  L  
 
  MCV (80.0 - 94.0 FL) 83.9  
 
  MCH (27.0 - 31.0 PG) 28.3  
 
  MCHC (33.0 - 37.0 G/DL) 33.8  
 
  RDW (11.5 - 14.5 %) 12.9  
 
  Plt Count (130 - 400 /CUMM) 358  
 
  MPV (7.4 - 10.4 FL) 7.6  
 
  Gran % (42.2 - 75.2 %) 86.6  H  
 
  Lymphocytes % (20.5 - 51.1 %) 8.2  L  
 
  Monocytes % (1.7 - 9.3 %) 5.2  
 
  Eosinophils % (0 - 5 %) 0  
 
  Basophils % (0.0 - 2.0 %) 0  
 
  Absolute Granulocytes (1.4 - 6.5 /CUMM) 14.6  H  
 
  Absolute Lymphocytes (1.2 - 3.4 /CUMM) 1.4  
 
  Absolute Monocytes (0.10 - 0.60 /CUMM) 0.9  H  
 
  Absolute Eosinophils (0.0 - 0.7 /CUMM) 0  
 
  Absolute Basophils (0.0 - 0.2 /CUMM) 0  
 
 
 
 
 05/12 05/12 05/12 05/12
 
 1726 1717 1715 1715
 
Chemistry    
 
  Sodium (137 - 145 mmol/L)  Cancelled  145
 
  Potassium (3.5 - 5.1 mmol/L)  Cancelled  5.8  H
 
  Chloride (98 - 107 mmol/L)  Cancelled  104
 
  Carbon Dioxide (22 - 30 mmol/L)  Cancelled  < 5.0 *L
 
  Anion Gap (5 - 16)  Cancelled  36  H
 
  BUN (9 - 20 mg/dL)  Cancelled  39  H
 
  Creatinine (0.7 - 1.2 mg/dL)  Cancelled  1.5  H
 
  Estimated GFR (>60 ml/min)    53  L
 
  BUN/Creatinine Ratio (7 - 25 %)  Cancelled  26.0  H
 
  Glucose  Cancelled  
 
  Lactic Acid (0.7 - 2.1 mmol/L) Cancelled  2.6  H 
 
  Calcium  Cancelled  
 
 
 
 
 05/12 05/12
 
 1455 1358
 
Blood Gas  
 
  pH (7.35 - 7.45 PH) 7.09 *L 
 
  pCO2 (35 - 45 TORR) 19  L 
 
  pO2 (80 - 100 TORR) 115  H 
 
  HCO3 (21 - 28 MEQ/L) 6  L 
 
  ABG O2 Sat (Measured) (>96.0 %) 96.0 
 
  Carboxyhemoglobin (1.5 - 5.0 %) 0.5  L 
 
  O2 Concentration % RA 
 
  O2 Delivery Method RA 
 
Chemistry  
 
  Sodium (137 - 145 mmol/L)  137
 
  Potassium (3.5 - 5.1 mmol/L)  7.5 *H
 
  Chloride (98 - 107 mmol/L)  95  L
 
  Carbon Dioxide (22 - 30 mmol/L)  6 *L
 
  Anion Gap (5 - 16)  36  H
 
  BUN (9 - 20 mg/dL)  38  H
 
  Creatinine (0.7 - 1.2 mg/dL)  1.6  H
 
  Estimated GFR (>60 ml/min)  49  L
 
  BUN/Creatinine Ratio (7 - 25 %)  23.8
 
  Glucose (65 - 99 mg/dL)  812 *H
 
  Serum Osmolality (285 - 295 MOSM/KG)  354  H
 
  Lactic Acid (0.7 - 2.1 mmol/L)  3.9  H
 
  Calcium (8.4 - 10.2 mg/dL)  9.6
 
  Magnesium (1.6 - 2.3 mg/dL)  2.4  H
 
  Total Bilirubin (0.2 - 1.3 mg/dL)  0.7
 
  AST (17 - 59 U/L)  20
 
  ALT (21 - 72 U/L)  26
 
  Alkaline Phosphatase (< 127 U/L)  129  H
 
  Troponin I (<0.11 ng/ml)  < 0.01
 
  Total Protein (6.3 - 8.2 g/dL)  7.8
 
  Albumin (3.5 - 5.0 g/dL)  5.2  H
 
  Globulin (1.9 - 4.2 gm/dL)  2.6
 
  Albumin/Globulin Ratio (1.1 - 2.2 %)  2.0
 
  Lipase (23 - 300 U/L)  32
 
Hematology  
 
  CBC w Diff  MAN DIFF ORDERED
 
  WBC (4.8 - 10.8 /CUMM)  20.3  H
 
  RBC (4.70 - 6.10 /CUMM)  5.12
 
  Hgb (14.0 - 18.0 G/DL)  14.4
 
  Hct (42 - 52 %)  43.5
 
  MCV (80.0 - 94.0 FL)  85.1
 
  MCH (27.0 - 31.0 PG)  28.1
 
  MCHC (33.0 - 37.0 G/DL)  33.0
 
  RDW (11.5 - 14.5 %)  12.6
 
  Plt Count (130 - 400 /CUMM)  405  H
 
  MPV (7.4 - 10.4 FL)  8.2
 
  Gran % (42.2 - 75.2 %)  93.5  H
 
  Lymphocytes % (20.5 - 51.1 %)  4.0  L
 
  Monocytes % (1.7 - 9.3 %)  2.4
 
  Eosinophils % (0 - 5 %)  0
 
  Basophils % (0.0 - 2.0 %)  0.1
 
  Absolute Granulocytes (1.4 - 6.5 /CUMM)  19.0  H
 
  Absolute Lymphocytes (1.2 - 3.4 /CUMM)  0.8  L
 
  Absolute Monocytes (0.10 - 0.60 /CUMM)  0.5
 
  Absolute Eosinophils (0.0 - 0.7 /CUMM)  0
 
  Absolute Basophils (0.0 - 0.2 /CUMM)  0
 
  Platelet Estimate (ADEQUATE)  VERIFIED BY SMEAR
 
  Normocytic RBCs  VERIFIED
 
  Normochromic RBCs  VERIFIED
 
Miscellaneous  
 
  Phlebotomy Draw Site RIGHT RADIAL 
 
Toxicology  
 
  Acetone Level (NEGATIVE)  POSITIVE AT 1:16 DIL

## 2018-05-13 NOTE — PN- RESIDENT CRCU
Solange CULVER,[facility] 18 0811:
Subjective
HPI/CRCU Issues:
Mr. Rodriguez was seen and examined this morning.  He is resting comfortably in 
bed.  Denies any issues overnight and was able to get some rest.  Denies any 
fever or chills. His nausea and vomiting have improved.  Has not had any further
episodes of emesis overnight.  Wife was present  at bedside during the time of 
clinical inteaction.  Currently has IV fluids and an insulin drip running.
24 Hour Events:
No Events reported 
 
Objective
 
Vital Signs & I&O
Last 8 Hrs of Vitals and I&O:
T: 99.7-100.3. HR: 110-124. ST
16-24
BP: 95//63
 
Exam
General Appearance: well developed/nourished, no apparent distress, alert, 
comfortable
Respiratory: normal breath sounds
Cardiovascular: regular rate/rhythm
Gastrointestinal: normal bowel sounds, soft, non-tender
Extremities: normal inspection, normal capillary refill, normal range of motion,
no edema
Cranial Nerves: normal hearing
Skin: intact
Skin Temp/Moisture Exam: Warm/Dry
Current Medications:
 Current Medications
 
 
  Sig/Rubina Start time  Last
 
Medication Dose Route Stop Time Status Admin
 
Acetaminophen 650 MG ONCE ONE  1115 DC 
 
  PO  1116  1116
 
Albuterol Sulfate 2 PUF Q4P PRN  2230 AC 
 
  INH   
 
Calcium Gluconate 0 .STK-MED ONE  1457 DC 
 
  IV   
 
Calcium Gluconate 1 GM ONCE ONE  1445 DC 
 
Sodium Chloride 100 ML IV  1544  1456
 
Dextrose/Sodium  1,000 ML Q6H  2230 AC 
 
Chloride  IV   0605
 
Heparin Sodium  5,000 UNIT Q8  2200 AC 
 
(Porcine)  SC   
 
Insulin Aspart 0 TIDAC/HS  1200 AC 
 
  SC   
 
Insulin Detemir 15 UNITS BID  1115 AC 
 
  SC   1116
 
Insulin Human Regular 100 UNIT Q24H  2045 AC 
 
Sodium Chloride 100 ML IV   2233
 
Insulin Human Regular 10 UNITS ONCE ONE  1445 DC 05/12
 
  IV  1446  1456
 
Insulin Human Regular 100 UNIT ONCE ONE  1445 DC 
 
Sodium Chloride 100 ML IV  1446  1619
 
Metoclopramide HCl 0 .STK-MED ONE  1505 DC 
 
  .ROUTE   
 
Metoclopramide HCl 10 MG ONCE ONE  1500 DC 05/12
 
  IV / 1501  1501
 
Ondansetron HCl 0 .STK-MED ONE  1352 DC 
 
  .ROUTE   
 
Ondansetron HCl 4 MG ONCE ONE  1330 DC 05/12
 
  IV 05/ 1331  1347
 
Pantoprazole Sodium 40 MG DAILY  0900 AC 
 
  IV   
 
Pantoprazole Sodium 0 .STK-MED ONE  1506 DC 
 
  IV   
 
Pantoprazole Sodium 40 MG ONCE ONE  1500 DC 05/12
 
  IV 05/ 1501  1501
 
Sodium Chloride 1,000 ML .Q6H40M 05/ 1830 DC 05/
 
  IV   1843
 
Sodium Chloride 1,000 ML ONCE ONE  1745 CAN 
 
  IV  0024  
 
Sodium Chloride 1,000 ML BOLUS ONE  1515 DC 
 
  IV 05/12 1614  
 
Sodium Chloride 1,000 ML BOLUS ONE  1515 DC 
 
  IV 05/ 1614  
 
Sodium Chloride 1,000 ML BOLUS ONE  1445 DC 05/
 
  IV 05/ 1544  1456
 
Sodium Chloride 1,000 ML BOLUS ONE  1345 DC 05/12
 
  IV 05/ 1444  1352
 
Sodium Chloride 1,000 ML BOLUS ONE  1330 DC 05/
 
  IV 05/ 1429  1347
 
Trimethobenzamide HCl 0 .STK-MED ONE  1854 DC 
 
  IM   
 
Trimethobenzamide HCl 200 MG TID PRN  1815 AC /
 
  IM   1853
 
 
 
 
Impression/Plan
 
Impression/Problem List
Impression:
Mr Rodriguez is a 38-year-old gentleman with a history of type 1 diabetes who had 
not taken his insulin for 2 days PTA with one-day history of abdominal symptoms 
of pain, nausea and vomiting in the setting of laboratory workup significant for
glucose level of 800, bicarbonate of 6, positive ketones, and a pH of 7.01. 
 
Impression
* Acute abdominal pain associated with nausea and vomiting.  Most likely 
secondary to diabetes acidosis as abdominal pain and vomiting is the most common
clinical presentation in patients who are in DKA.  Patient is afebrile and there
is low suspicion for infectious etiology, even though there is leukocytosis this
is most likely secondary to inflammatory responses from the DKA rather than 
infection.
* Diabetes ketoacidosis (severe) as evident by elevated sugars of 800, acidic pH
of 7.0, and positive serum ketones.  Based on patient's history, his cause of 
DKA is most likely secondary to noncompliance. Acute infection is also one of 
the common cause of DKA however there is low suspicion in this patient.
* Anion Gap metabolic acidosis. 2/2 to DKA and lactic acidosis.
* Hyperkalemia with EKG changes. Multifactorial, 2/2 to significant acidosis  vs
Losartan use.
* CHAZ. Most likely  2/2 to pre-renal etiology, NSAID use probably contributory.
* Leukocytosis. Most likely 2/2 to DKA, no clinical suspicion for infection.
* Lactic acidosis. Most likely 2/2 to severe dehydration.
 
Plan
Continue management in the ICU, may consider downgrading to floor if stable in 
AM. 
Currenlty NPO, but will advance diet now that gap and bicarbonate has 
normalized. 
s/p 4 L NS, continue with  IVF  hydration, reduce fluids to d5 1/2 NS. May 
discontinue is PO intate is adequate. 
Continue insulin drip at 1 units per hour, will transition to SC insulin with 
levemir and novolog as per sliding scale from Endorcrinology. 
Accu-Cheks TID/HS
Tigan for nausea (QTc prolongation precludes Zofran use).
BEP every q24 hours for now and monitor for potassium, bicarbonate, and sodium (
The fact that his sodium is 137 in the context of blood glucose of over 800, 
signifies significant dehydration. We will switch to 1/2 Normal saline to 
provide free water and prevent hypernatremia.). 
H/H did fall: 14.4 to 12.4. Positive Guiac stool on 18. If H/H still falls
in AM, consider GI consulation. 
In the context of hyperkalemia and CHAZ, hold Losartan,, consider amlodipine if 
Bp is elevated.
Social work consult (pt has complex family dynamics, just released from residential and
reports not having money for food).
Full code
Problem List:
 1. DKA (diabetic ketoacidoses)
 
 2. Diarrhea
 
Pain Ratin
Tomorrow's Labs & Rationales:
CBC: Monitoring Leukocytosis 
ICU bundle: Critically Ill monitoring H/H
 
Plan
DVT/Prophylaxis: mechanical
 
 
Levar CULVER,[place] 18 1249:
Attending MD Review Statement
 
Attending Sign Off
Attending Cosign Statement:
I have: examined this patient, reviewed [facility] EMR data, personally reviewd 
images, discussd w/resident/PA/NP, discussed mgmt plan w/armani, discussed mgmt 
plan w/pt, agreed w/resident/PA/NP, amended to note. 
Other Findings:
Patient seen and examined, overall doing better.  Blood sugars have improved and
an overall his lab work looks much better.
 
Vital Signs
 
 
Date Time Temp Pulse Resp B/P B/P Pulse O2 O2 Flow FiO2
 
     Mean Ox Delivery Rate 
 
 0000 100.3 116 18 120/64  100 Room Air Room Air 
 
 2156       Room Air Room Air 
 
 202 99.7 124 24 130/62  100 Room Air Room Air 
 
2008 98.6 122 18 121/64  99 Room Air  
 
 1823 98.4 122 16 123/63  100 Room Air  
 
 1631 97.8 120 18 112/56  100 Room Air  
 
 1509 96.0 124 24 126/58  100 Room Air  
 
 1506       Room Air  
 
 1320 96.5 110 18   98 Room Air Room Air 
 
 
on exam; 
aox3, nad. 
cv; s1,s2 rrr
resp; clear
abd; soft, nt, bs+
ext; no edema
 
 Laboratory Tests
 
 
 
 
 1014 0410 2310 
 
Chemistry    
 
  Sodium (137 - 145 mmol/L) 145 144 147  H 148  H
 
  Potassium (3.5 - 5.1 mmol/L) 4.0 4.5 4.7 4.5
 
  Chloride (98 - 107 mmol/L) 114  H 111  H 112  H 110  H
 
  Carbon Dioxide (22 - 30 mmol/L) 18  L 15  L 12  L 9 *L
 
  Anion Gap (5 - 16) 13 18  H 23  H 29  H
 
  BUN (9 - 20 mg/dL) 29  H 33  H 36  H 36  H
 
  Creatinine (0.7 - 1.2 mg/dL) 1.0 1.3  H 1.5  H 1.5  H
 
  Estimated GFR (>60 ml/min) > 60 > 60 53  L 53  L
 
  BUN/Creatinine Ratio (7 - 25 %)   24.0 24.0
 
  Glucose (65 - 99 mg/dL) 105  H 243  H  
 
  Lactic Acid (0.7 - 2.1 mmol/L)   1.2 2.2  H
 
  Calcium (8.4 - 10.2 mg/dL) 8.9 8.8  
 
  Phosphorus (2.5 - 4.5 mg/dL) 2.6 3.0  
 
  Magnesium (1.6 - 2.3 mg/dL) 2.3 2.3  
 
  Total Bilirubin (0.2 - 1.3 mg/dL) 0.5 0.5  
 
  AST (17 - 59 U/L) 11  L 11  L  
 
  ALT (21 - 72 U/L) 28 33  
 
  Albumin (3.5 - 5.0 g/dL) 3.6 3.8  
 
Hematology    
 
  CBC w Diff  NO MAN DIFF REQ  
 
  WBC (4.8 - 10.8 /CUMM)  16.8  H  
 
  RBC (4.70 - 6.10 /CUMM)  4.38  L  
 
  Hgb (14.0 - 18.0 G/DL)  12.4  L  
 
  Hct (42 - 52 %)  36.7  L  
 
  MCV (80.0 - 94.0 FL)  83.9  
 
  MCH (27.0 - 31.0 PG)  28.3  
 
  MCHC (33.0 - 37.0 G/DL)  33.8  
 
  RDW (11.5 - 14.5 %)  12.9  
 
  Plt Count (130 - 400 /CUMM)  358  
 
  MPV (7.4 - 10.4 FL)  7.6  
 
  Gran % (42.2 - 75.2 %)  86.6  H  
 
  Lymphocytes % (20.5 - 51.1 %)  8.2  L  
 
  Monocytes % (1.7 - 9.3 %)  5.2  
 
  Eosinophils % (0 - 5 %)  0  
 
  Basophils % (0.0 - 2.0 %)  0  
 
  Absolute Granulocytes (1.4 - 6.5 /CUMM)  14.6  H  
 
  Absolute Lymphocytes (1.2 - 3.4 /CUMM)  1.4  
 
  Absolute Monocytes (0.10 - 0.60 /CUMM)  0.9  H  
 
  Absolute Eosinophils (0.0 - 0.7 /CUMM)  0  
 
  Absolute Basophils (0.0 - 0.2 /CUMM)  0  
 
 
 
 
 
 
 1726 1717 1715 1715
 
Chemistry    
 
  Sodium (137 - 145 mmol/L)  Cancelled  145
 
  Potassium (3.5 - 5.1 mmol/L)  Cancelled  5.8  H
 
  Chloride (98 - 107 mmol/L)  Cancelled  104
 
  Carbon Dioxide (22 - 30 mmol/L)  Cancelled  < 5.0 *L
 
  Anion Gap (5 - 16)  Cancelled  36  H
 
  BUN (9 - 20 mg/dL)  Cancelled  39  H
 
  Creatinine (0.7 - 1.2 mg/dL)  Cancelled  1.5  H
 
  Estimated GFR (>60 ml/min)    53  L
 
  BUN/Creatinine Ratio (7 - 25 %)  Cancelled  26.0  H
 
  Glucose  Cancelled  
 
  Lactic Acid (0.7 - 2.1 mmol/L) Cancelled  2.6  H 
 
  Calcium  Cancelled  
 
 
 
 
 
 
 1455 1358
 
Blood Gas  
 
  pH (7.35 - 7.45 PH) 7.09 *L 
 
  pCO2 (35 - 45 TORR) 19  L 
 
  pO2 (80 - 100 TORR) 115  H 
 
  HCO3 (21 - 28 MEQ/L) 6  L 
 
  ABG O2 Sat (Measured) (>96.0 %) 96.0 
 
  Carboxyhemoglobin (1.5 - 5.0 %) 0.5  L 
 
  O2 Concentration % RA 
 
  O2 Delivery Method RA 
 
Chemistry  
 
  Sodium (137 - 145 mmol/L)  137
 
  Potassium (3.5 - 5.1 mmol/L)  7.5 *H
 
  Chloride (98 - 107 mmol/L)  95  L
 
  Carbon Dioxide (22 - 30 mmol/L)  6 *L
 
  Anion Gap (5 - 16)  36  H
 
  BUN (9 - 20 mg/dL)  38  H
 
  Creatinine (0.7 - 1.2 mg/dL)  1.6  H
 
  Estimated GFR (>60 ml/min)  49  L
 
  BUN/Creatinine Ratio (7 - 25 %)  23.8
 
  Glucose (65 - 99 mg/dL)  812 *H
 
  Serum Osmolality (285 - 295 MOSM/KG)  354  H
 
  Lactic Acid (0.7 - 2.1 mmol/L)  3.9  H
 
  Calcium (8.4 - 10.2 mg/dL)  9.6
 
  Magnesium (1.6 - 2.3 mg/dL)  2.4  H
 
  Total Bilirubin (0.2 - 1.3 mg/dL)  0.7
 
  AST (17 - 59 U/L)  20
 
  ALT (21 - 72 U/L)  26
 
  Alkaline Phosphatase (< 127 U/L)  129  H
 
  Troponin I (<0.11 ng/ml)  < 0.01
 
  Total Protein (6.3 - 8.2 g/dL)  7.8
 
  Albumin (3.5 - 5.0 g/dL)  5.2  H
 
  Globulin (1.9 - 4.2 gm/dL)  2.6
 
  Albumin/Globulin Ratio (1.1 - 2.2 %)  2.0
 
  Lipase (23 - 300 U/L)  32
 
Hematology  
 
  CBC w Diff  MAN DIFF ORDERED
 
  WBC (4.8 - 10.8 /CUMM)  20.3  H
 
  RBC (4.70 - 6.10 /CUMM)  5.12
 
  Hgb (14.0 - 18.0 G/DL)  14.4
 
  Hct (42 - 52 %)  43.5
 
  MCV (80.0 - 94.0 FL)  85.1
 
  MCH (27.0 - 31.0 PG)  28.1
 
  MCHC (33.0 - 37.0 G/DL)  33.0
 
  RDW (11.5 - 14.5 %)  12.6
 
  Plt Count (130 - 400 /CUMM)  405  H
 
  MPV (7.4 - 10.4 FL)  8.2
 
  Gran % (42.2 - 75.2 %)  93.5  H
 
  Lymphocytes % (20.5 - 51.1 %)  4.0  L
 
  Monocytes % (1.7 - 9.3 %)  2.4
 
  Eosinophils % (0 - 5 %)  0
 
  Basophils % (0.0 - 2.0 %)  0.1
 
  Absolute Granulocytes (1.4 - 6.5 /CUMM)  19.0  H
 
  Absolute Lymphocytes (1.2 - 3.4 /CUMM)  0.8  L
 
  Absolute Monocytes (0.10 - 0.60 /CUMM)  0.5
 
  Absolute Eosinophils (0.0 - 0.7 /CUMM)  0
 
  Absolute Basophils (0.0 - 0.2 /CUMM)  0
 
  Platelet Estimate (ADEQUATE)  VERIFIED BY SMEAR
 
  Normocytic RBCs  VERIFIED
 
  Normochromic RBCs  VERIFIED
 
Miscellaneous  
 
  Phlebotomy Draw Site RIGHT RADIAL 
 
Toxicology  
 
  Acetone Level (NEGATIVE)  POSITIVE AT 1:16 DIL
 
 
A/P: 38-year-old male with past medical history significant for type 1 diabetes,
hypertension, hyperlipidemia admitted to ICU with DKA, severe acidosis, severe 
hyperglycemia and guaiac positive stools, nausea vomiting. Also had high 
lactate.  Patient had severe hyperkalemia on admission which is improved.
 
Anion gap has been closed.  Bicarbonate is improving.  Lactate has improved to 
normal.  Creatinine improved to normal.  Patient has received IV fluids.  
Currently now that his anion gap has been closed, patient will be started on 
subcutaneous insulin after the incident drip is stopped and patient will be 
started on diet.  Insulin management per endocrinology.  His potassium levels 
have also improved.
Recheck stool for guaiac and monitor H&H.  If there is a dropout patient 
continues to have guaiac positive stool or evidence of active bleeding, GI will 
need to be consulted.
Continue this for the management [facility]

## 2018-05-14 NOTE — PN- RESIDENT CRCU
Сергей CULVER,Dale 18 0758:
Subjective
HPI/CRCU Issues:
1.DKA
2.Hyperkalemia. Resolved
3.Lacitc acidosis. Resolved.
4. Social dynamics (currenlty homeless living in a storage facility?).
24 Hour Events:
Seen and examined at [facility]. He is sitting comfortably eating breakfast. He 
does not endorse any acute complaint such as shortness of breath,fever/chills, 
nausea/vomiting, cp/palpiation,abdominal pain, dysuria or polyuria. There is no 
acute overnight event reported by nursing staff. His blood glucose o/n averaged 
293.
 
Objective
 
Vital Signs & I&O
Last 8 Hrs of Vitals and I&O:
 Laboratory Tests
 
 
 
18 0513:
Anion Gap 15, Estimated GFR > 60, Glucose 294  H, Calcium 8.9, Phosphorus 2.4  L
, Magnesium 1.9, Total Bilirubin 0.5, AST 17, ALT 24, Albumin 3.7, CBC w Diff NO
MAN DIFF REQ, RBC 4.43  L, MCV 84.3, MCH 28.0, MCHC 33.2, RDW 13.2, MPV 7.4, 
Gran % 71.2, Lymphocytes % 23.1, Monocytes % 4.5, Eosinophils % 1.0, Basophils %
0.2, Absolute Granulocytes 7.4  H, Absolute Lymphocytes 2.4, Absolute Monocytes 
0.5, Absolute Eosinophils 0.1, Absolute Basophils 0
 
18 1014:
Anion Gap 13, Estimated GFR > 60, Glucose 105  H, Calcium 8.9, Phosphorus 2.6, 
Magnesium 2.3, Total Bilirubin 0.5, AST 11  L, ALT 28, Albumin 3.6
 Vital Signs
 
 
Date Time Temp Pulse Resp B/P B/P Pulse O2 O2 Flow FiO2
 
     Mean Ox Delivery Rate 
 
 0400      95 Room Air  
 
 0000      99 Room Air  
 
 0000 99.1 107 25 147/89  99 Room Air  
 
 2000      100 Room Air  
 
 1600      100 Room Air  
 
 1600 99.2 96 15 140/80  100 Room Air  
 
 1200      99 Room Air  
 
 
 Intake & Output
 
 
  1600  0800  0000
 
Intake Total  300 390
 
Output Total  2 2
 
Balance  298 388
 
    
 
Intake, IV  0 150
 
Intake, Oral  300 240
 
Number  2 1
 
Bowel   
 
Movements   
 
Output, Urine  2 2
 
 
 
 
Exam
General Appearance: well developed/nourished, no apparent distress, alert, awake
, comfortable
Other Physical Findings:
Respiratory: normal breath sounds
Cardiovascular: regular rate/rhythm
Gastrointestinal: normal bowel sounds, soft, non-tender
Extremities: normal inspection, normal capillary refill, normal range of motion,
no edema
Cranial Nerves: normal hearing
Skin: intact
Skin Temp/Moisture Exam: Warm/Dry
Current Medications:
 Current Medications
 
 
  Sig/Rubina Start time  Last
 
Medication Dose Route Stop Time Status Admin
 
Acetaminophen 650 MG ONCE ONE  181 DC 
 
  PO  1816  1820
 
Albuterol Sulfate 2 PUF Q4-6 PRN PRN  1315 CAN 
 
  INH   
 
Albuterol Sulfate 2 PUF Q4P PRN  2230 AC 
 
  INH   
 
Budesonide/ 2 PUF BID  2100 AC 
 
Formoterol Fumarate  INH   0807
 
Dextrose/Sodium  1,000 ML Q20H  1245 DC 
 
Chloride  IV   1321
 
Dicyclomine HCl 10 MG TID PRN  1130 AC 
 
  PO   
 
Heparin Sodium  5,000 UNIT Q8  2200 AC 
 
(Porcine)  SC   
 
Insulin Aspart 0 TIDAC/HS  1200 AC 
 
  SC   1133
 
Insulin Detemir 18 UNITS BID  0900 AC 
 
  SC   0808
 
Insulin Detemir 15 UNITS BID  1115 DC 
 
  SC   2137
 
Loperamide HCl 2 MG Q6P PRN  1130 AC 
 
  PO   
 
Losartan Potassium 25 MG DAILY  1048 AC 
 
  PO   
 
Omeprazole 40 MG DAILY AC  1232 AC 
 
  PO   0633
 
Pregabalin 150 MG DAILY  0900 DC 
 
  PO   
 
Pregabalin 150 MG DAILY  2030 AC 
 
  PO   0806
 
Trimethobenzamide HCl 200 MG .STK-MED ONE  181 DC 
 
  IM  1818  
 
Trimethobenzamide HCl 200 MG TID PRN  1815 AC 
 
  IM   1820
 
 
 
 
Impression/Plan
 
Impression/Problem List
Impression:
* Acute abdominal pain associated with nausea and vomiting.  Resolved.
* Diabetes ketoacidosis (severe) as evident by elevated sugars of 800, acidic pH
of 7.0, and positive serum ketones on presentation. Now resolved.  .
* Anion Gap metabolic acidosis. 2/2 to DKA and lactic acidosis. Now resolved.
* Hyperkalemia with EKG changes. Multifactorial, 2/2 to significant acidosis  vs
Losartan use, now resolved.
* CHAZ. Most likely  2/2 to pre-renal etiology, NSAID use probably contributory. 
Now resolved.
* Leukocytosis. Most likely 2/2 to DKA, no clinical suspicion for infection. Now
resolved.
* Need for social consult due to housing and family dymnamics
Plan
Increase Levemir to 18 units bid per endocrine in the setting of elevated night 
glucose levels.
Continue current fast acting insulin regimen
Will Losartan today as BP is now in the 140s, It was being held due to 
hyperkalemia as it can worsen this condtion. Given that the CHAZ and severe 
acidosis was more likely the cause of hyperkalemia, rather than the losartan, it
is reasonably to restart the Losartan as it has a compelling indication in a 
diabetic patient with HTN.
Continue TRC/Nebs
Continue home meds of PPI and Lyrica
Await social consult (pt has complex domestic dynamics, just released from skilled nursing,
does not have a home and is living at a storage facility).
DVT PPX: Heparin
Code : FC
Problem List:
 1. Diarrhea
 
 2. Hyperglycemia
 
Pain Ratin
Tomorrow's Labs & Rationales:
bep
 
Plan
DVT/Prophylaxis: mechanical
 
 
Collins,Maryeep 18 1128:
Attending MD Review Statement
 
Attending Sign Off
Attending Cosign Statement:
I have: examined this patient, reviewed al EMR data, discussd w/resident/PA/
NP, discussed mgmt plan w/armani, discussed mgmt plan w/pt, agreed w/resident/PA/
NP. 
Other Findings:
pt admitted with 
DKA with high anion gap and hyperkalemia. Resolving now and BS under better 
control. Pt was taking 24U Levemir bid at home. Dr Falk consult appreciated. 
Increased levemir to 18 U BID . 
 
Diarrhea- pt was taking prn lomotil and dicyclomine prn. will restart those. Pt 
had colonoscopy with Dr Marrero about a year ago. 
 
Plan transfer to [facility]. Pt stable for transfer. 
 
d/w pt and pts significant other at bedside the care plan.

## 2018-05-14 NOTE — PN- DIABETES
Assessment/Plan Diabetes
Assessment:
The patient feels okay.  He had some nausea yesterday.  He had no vomiting 
however.  He is on 15 units of Levemir twice a day and sliding scale NovoLog.  
Blood sugars yesterday after the insulin drip was shut off was 226 before dinner
and 293 at bedtime.  This morning his blood sugar in the lab is 294.
 
Plan:
Suggest that we increase the patient's Levemir to 18 units twice a day.  
Continue present sliding scale NovoLog before meals and a separate sliding scale
at bedtime.  Patient can be moved out of intensive care to a regular hospital 
bed.
 
The patient states he is living in a storage unit.  He just got out of custodial a 
few days ago.  We need a social service consult.
 
Subjective
Subjective:
Feels better
 
Review of Systems
Constitutional:
Denies: chills, fever. 
Cardiovascular:
Denies: chest pain. 
Respiratory:
Denies: cough, short of breath. 
Gastrointestinal:
Reports: nausea.  Denies: abdominal pain, vomiting. 
Musculoskeletal:
Denies: muscle pain. 
 
Objective
Last 24 Hrs of Vital Signs/I&O
 Vital Signs
 
 
Date Time Temp Pulse Resp B/P B/P Pulse O2 O2 Flow FiO2
 
     Mean Ox Delivery Rate 
 
05/14 0400      95 Room Air  
 
05/14 0000      99 Room Air  
 
05/14 0000 99.1 107 25 147/89  99 Room Air  
 
05/13 2000      100 Room Air  
 
05/13 1600      100 Room Air  
 
05/13 1600 99.2 96 15 140/80  100 Room Air  
 
05/13 1200      99 Room Air  
 
05/13 0800 99.8 106 16 108/74  100 Room Air  
 
05/13 0800      99  Room Air 
 
 
 Intake & Output
 
 
 05/14 0800 05/14 0000 05/13 1600
 
Intake Total 
 
Output Total 2 2 325
 
Balance 298 388 706
 
    
 
Intake, IV 0 150 911
 
Intake, Oral 300 240 120
 
Number 2 1 
 
Bowel   
 
Movements   
 
Output, Urine 2 2 325
 
 
 Vital Signs
 
 
Date Time Temp Pulse Resp B/P B/P Pulse O2 O2 Flow FiO2
 
     Mean Ox Delivery Rate 
 
05/14 0400      95 Room Air  
 
05/14 0000      99 Room Air  
 
05/14 0000 99.1 107 25 147/89  99 Room Air  
 
05/13 2000      100 Room Air  
 
05/13 1600      100 Room Air  
 
05/13 1600 99.2 96 15 140/80  100 Room Air  
 
05/13 1200      99 Room Air  
 
05/13 0800 99.8 106 16 108/74  100 Room Air  
 
05/13 0800      99  Room Air 
 
 
 Intake & Output
 
 
 05/14 0800 05/14 0000 05/13 1600
 
Intake Total 
 
Output Total 2 2 325
 
Balance 298 388 706
 
    
 
Intake, IV 0 150 911
 
Intake, Oral 300 240 120
 
Number 2 1 
 
Bowel   
 
Movements   
 
Output, Urine 2 2 325
 
 
 
 
Physical Exam
General Appearance: alert, awake, comfortable
Head: normal appearance
Neck: normal inspection
Respiratory: normal breath sounds
Cardiovascular: regular rate/rhythm
Abdomen: normal bowel sounds, soft
Extremities: normal inspection
Current Medications:
 Current Medications
 
 
  Sig/Rubina Start time  Last
 
Medication Dose Route Stop Time Status Admin
 
Acetaminophen 650 MG ONCE ONE 05/13 1815 DC 05/13
 
  PO 05/13 1816  1820
 
Acetaminophen 650 MG ONCE ONE 05/13 1115 DC 05/13
 
  PO 05/13 1116  1116
 
Albuterol Sulfate 2 PUF Q4-6 PRN PRN 05/13 1315 CAN 
 
  INH   
 
Albuterol Sulfate 2 PUF Q4P PRN 05/12 2230 AC 
 
  INH   
 
Budesonide/ 2 PUF BID 05/13 2100 AC 05/13
 
Formoterol Fumarate  INH   2228
 
Dextrose/Sodium  1,000 ML Q20H 05/13 1245 DC 05/13
 
Chloride  IV   1321
 
Dextrose/Sodium  1,000 ML Q6H 05/12 2230 DC 05/13
 
Chloride  IV   1228
 
Heparin Sodium  5,000 UNIT Q8 05/12 2200 AC 
 
(Porcine)  SC   
 
Insulin Aspart 0 TIDAC/HS 05/13 1200 AC 05/13
 
  SC   2137
 
Insulin Detemir 15 UNITS BID 05/13 1115 AC 05/13
 
  SC   2137
 
Insulin Human Regular 100 UNITS .STK-MED ONE 05/13 0757 DC 
 
  IV 05/13 0758  
 
Insulin Human Regular 100 UNIT Q24H 05/12 2045 DC 05/12
 
Sodium Chloride 100 ML IV   2233
 
Omeprazole 40 MG DAILY AC 05/13 1232 AC 05/14
 
  PO   0633
 
Pantoprazole Sodium 40 MG DAILY 05/13 0900 DC 
 
  IV   
 
Pregabalin 150 MG DAILY 05/14 0900 DC 
 
  PO   
 
Pregabalin 150 MG DAILY 05/13 2030 AC 05/13
 
  PO   2048
 
Trimethobenzamide HCl 200 MG .STK-MED ONE 05/13 1817 DC 
 
  IM 05/13 1818  
 
Trimethobenzamide HCl 200 MG TID PRN 05/12 1815 AC 05/13
 
  IM   1820
 
 
 
 
Findings
Pertinent Lab/Rayshawn Results:
 Laboratory Tests
 
 
 05/14 05/13
 
 0513 1014
 
Chemistry  
 
  Sodium (137 - 145 mmol/L) 142 145
 
  Potassium (3.5 - 5.1 mmol/L) 3.8 4.0
 
  Chloride (98 - 107 mmol/L) 107 114  H
 
  Carbon Dioxide (22 - 30 mmol/L) 19  L 18  L
 
  Anion Gap (5 - 16) 15 13
 
  BUN (9 - 20 mg/dL) 18 29  H
 
  Creatinine (0.7 - 1.2 mg/dL) 0.8 1.0
 
  Estimated GFR (>60 ml/min) > 60 > 60
 
  Glucose (65 - 99 mg/dL) 294  H 105  H
 
  Calcium (8.4 - 10.2 mg/dL) 8.9 8.9
 
  Phosphorus (2.5 - 4.5 mg/dL) 2.4  L 2.6
 
  Magnesium (1.6 - 2.3 mg/dL) 1.9 2.3
 
  Total Bilirubin (0.2 - 1.3 mg/dL) 0.5 0.5
 
  AST (17 - 59 U/L) 17 11  L
 
  ALT (21 - 72 U/L) 24 28
 
  Albumin (3.5 - 5.0 g/dL) 3.7 3.6
 
Hematology  
 
  CBC w Diff NO MAN DIFF REQ 
 
  WBC (4.8 - 10.8 /CUMM) 10.4 
 
  RBC (4.70 - 6.10 /CUMM) 4.43  L 
 
  Hgb (14.0 - 18.0 G/DL) 12.4  L 
 
  Hct (42 - 52 %) 37.4  L 
 
  MCV (80.0 - 94.0 FL) 84.3 
 
  MCH (27.0 - 31.0 PG) 28.0 
 
  MCHC (33.0 - 37.0 G/DL) 33.2 
 
  RDW (11.5 - 14.5 %) 13.2 
 
  Plt Count (130 - 400 /CUMM) 307 
 
  MPV (7.4 - 10.4 FL) 7.4 
 
  Gran % (42.2 - 75.2 %) 71.2 
 
  Lymphocytes % (20.5 - 51.1 %) 23.1 
 
  Monocytes % (1.7 - 9.3 %) 4.5 
 
  Eosinophils % (0 - 5 %) 1.0 
 
  Basophils % (0.0 - 2.0 %) 0.2 
 
  Absolute Granulocytes (1.4 - 6.5 /CUMM) 7.4  H 
 
  Absolute Lymphocytes (1.2 - 3.4 /CUMM) 2.4 
 
  Absolute Monocytes (0.10 - 0.60 /CUMM) 0.5 
 
  Absolute Eosinophils (0.0 - 0.7 /CUMM) 0.1 
 
  Absolute Basophils (0.0 - 0.2 /CUMM) 0 
 
 
 
 
 05/13 05/12 05/12
 
 0410 2310 2035
 
Chemistry   
 
  Sodium (137 - 145 mmol/L) 144 147  H 148  H
 
  Potassium (3.5 - 5.1 mmol/L) 4.5 4.7 4.5
 
  Chloride (98 - 107 mmol/L) 111  H 112  H 110  H
 
  Carbon Dioxide (22 - 30 mmol/L) 15  L 12  L 9 *L
 
  Anion Gap (5 - 16) 18  H 23  H 29  H
 
  BUN (9 - 20 mg/dL) 33  H 36  H 36  H
 
  Creatinine (0.7 - 1.2 mg/dL) 1.3  H 1.5  H 1.5  H
 
  Estimated GFR (>60 ml/min) > 60 53  L 53  L
 
  BUN/Creatinine Ratio (7 - 25 %)  24.0 24.0
 
  Glucose (65 - 99 mg/dL) 243  H  
 
  Lactic Acid (0.7 - 2.1 mmol/L)  1.2 2.2  H
 
  Calcium (8.4 - 10.2 mg/dL) 8.8  
 
  Phosphorus (2.5 - 4.5 mg/dL) 3.0  
 
  Magnesium (1.6 - 2.3 mg/dL) 2.3  
 
  Total Bilirubin (0.2 - 1.3 mg/dL) 0.5  
 
  AST (17 - 59 U/L) 11  L  
 
  ALT (21 - 72 U/L) 33  
 
  Albumin (3.5 - 5.0 g/dL) 3.8  
 
Hematology   
 
  CBC w Diff NO MAN DIFF REQ  
 
  WBC (4.8 - 10.8 /CUMM) 16.8  H  
 
  RBC (4.70 - 6.10 /CUMM) 4.38  L  
 
  Hgb (14.0 - 18.0 G/DL) 12.4  L  
 
  Hct (42 - 52 %) 36.7  L  
 
  MCV (80.0 - 94.0 FL) 83.9  
 
  MCH (27.0 - 31.0 PG) 28.3  
 
  MCHC (33.0 - 37.0 G/DL) 33.8  
 
  RDW (11.5 - 14.5 %) 12.9  
 
  Plt Count (130 - 400 /CUMM) 358  
 
  MPV (7.4 - 10.4 FL) 7.6  
 
  Gran % (42.2 - 75.2 %) 86.6  H  
 
  Lymphocytes % (20.5 - 51.1 %) 8.2  L  
 
  Monocytes % (1.7 - 9.3 %) 5.2  
 
  Eosinophils % (0 - 5 %) 0  
 
  Basophils % (0.0 - 2.0 %) 0  
 
  Absolute Granulocytes (1.4 - 6.5 /CUMM) 14.6  H  
 
  Absolute Lymphocytes (1.2 - 3.4 /CUMM) 1.4  
 
  Absolute Monocytes (0.10 - 0.60 /CUMM) 0.9  H  
 
  Absolute Eosinophils (0.0 - 0.7 /CUMM) 0  
 
  Absolute Basophils (0.0 - 0.2 /CUMM) 0  
 
 
 
 
 05/12 05/12 05/12 05/12
 
 1726 1717 1715 1715
 
Chemistry    
 
  Sodium (137 - 145 mmol/L)  Cancelled  145
 
  Potassium (3.5 - 5.1 mmol/L)  Cancelled  5.8  H
 
  Chloride (98 - 107 mmol/L)  Cancelled  104
 
  Carbon Dioxide (22 - 30 mmol/L)  Cancelled  < 5.0 *L
 
  Anion Gap (5 - 16)  Cancelled  36  H
 
  BUN (9 - 20 mg/dL)  Cancelled  39  H
 
  Creatinine (0.7 - 1.2 mg/dL)  Cancelled  1.5  H
 
  Estimated GFR (>60 ml/min)    53  L
 
  BUN/Creatinine Ratio (7 - 25 %)  Cancelled  26.0  H
 
  Glucose  Cancelled  
 
  Lactic Acid (0.7 - 2.1 mmol/L) Cancelled  2.6  H 
 
  Calcium  Cancelled  
 
 
 
 
 05/12 05/12
 
 7630 9324
 
Blood Gas  
 
  pH (7.35 - 7.45 PH) 7.09 *L 
 
  pCO2 (35 - 45 TORR) 19  L 
 
  pO2 (80 - 100 TORR) 115  H 
 
  HCO3 (21 - 28 MEQ/L) 6  L 
 
  ABG O2 Sat (Measured) (>96.0 %) 96.0 
 
  Carboxyhemoglobin (1.5 - 5.0 %) 0.5  L 
 
  O2 Concentration % RA 
 
  O2 Delivery Method RA 
 
Chemistry  
 
  Sodium (137 - 145 mmol/L)  137
 
  Potassium (3.5 - 5.1 mmol/L)  7.5 *H
 
  Chloride (98 - 107 mmol/L)  95  L
 
  Carbon Dioxide (22 - 30 mmol/L)  6 *L
 
  Anion Gap (5 - 16)  36  H
 
  BUN (9 - 20 mg/dL)  38  H
 
  Creatinine (0.7 - 1.2 mg/dL)  1.6  H
 
  Estimated GFR (>60 ml/min)  49  L
 
  BUN/Creatinine Ratio (7 - 25 %)  23.8
 
  Glucose (65 - 99 mg/dL)  812 *H
 
  Serum Osmolality (285 - 295 MOSM/KG)  354  H
 
  Lactic Acid (0.7 - 2.1 mmol/L)  3.9  H
 
  Calcium (8.4 - 10.2 mg/dL)  9.6
 
  Magnesium (1.6 - 2.3 mg/dL)  2.4  H
 
  Total Bilirubin (0.2 - 1.3 mg/dL)  0.7
 
  AST (17 - 59 U/L)  20
 
  ALT (21 - 72 U/L)  26
 
  Alkaline Phosphatase (< 127 U/L)  129  H
 
  Troponin I (<0.11 ng/ml)  < 0.01
 
  Total Protein (6.3 - 8.2 g/dL)  7.8
 
  Albumin (3.5 - 5.0 g/dL)  5.2  H
 
  Globulin (1.9 - 4.2 gm/dL)  2.6
 
  Albumin/Globulin Ratio (1.1 - 2.2 %)  2.0
 
  Lipase (23 - 300 U/L)  32
 
Hematology  
 
  CBC w Diff  MAN DIFF ORDERED
 
  WBC (4.8 - 10.8 /CUMM)  20.3  H
 
  RBC (4.70 - 6.10 /CUMM)  5.12
 
  Hgb (14.0 - 18.0 G/DL)  14.4
 
  Hct (42 - 52 %)  43.5
 
  MCV (80.0 - 94.0 FL)  85.1
 
  MCH (27.0 - 31.0 PG)  28.1
 
  MCHC (33.0 - 37.0 G/DL)  33.0
 
  RDW (11.5 - 14.5 %)  12.6
 
  Plt Count (130 - 400 /CUMM)  405  H
 
  MPV (7.4 - 10.4 FL)  8.2
 
  Gran % (42.2 - 75.2 %)  93.5  H
 
  Lymphocytes % (20.5 - 51.1 %)  4.0  L
 
  Monocytes % (1.7 - 9.3 %)  2.4
 
  Eosinophils % (0 - 5 %)  0
 
  Basophils % (0.0 - 2.0 %)  0.1
 
  Absolute Granulocytes (1.4 - 6.5 /CUMM)  19.0  H
 
  Absolute Lymphocytes (1.2 - 3.4 /CUMM)  0.8  L
 
  Absolute Monocytes (0.10 - 0.60 /CUMM)  0.5
 
  Absolute Eosinophils (0.0 - 0.7 /CUMM)  0
 
  Absolute Basophils (0.0 - 0.2 /CUMM)  0
 
  Platelet Estimate (ADEQUATE)  VERIFIED BY SMEAR
 
  Normocytic RBCs  VERIFIED
 
  Normochromic RBCs  VERIFIED
 
Miscellaneous  
 
  Phlebotomy Draw Site RIGHT RADIAL 
 
Toxicology  
 
  Acetone Level (NEGATIVE)  POSITIVE AT 1:16 DIL
 
 
 
 
 05/12
 
 1325
 
Urines 
 
  Urine Color Cancelled
 
  Urine Clarity Cancelled
 
  Urine pH Cancelled
 
  Ur Specific Gravity Cancelled
 
  Urine Protein Cancelled
 
  Urine Ketones Cancelled
 
  Urine Nitrite Cancelled
 
  Urine Bilirubin Cancelled
 
  Urine Urobilinogen Cancelled
 
  Ur Leukocyte Esterase Cancelled
 
  Ur Microscopic Cancelled
 
  Urine Hemoglobin Cancelled
 
  Urine Glucose Cancelled

## 2018-05-15 NOTE — PATIENT DISCHARGE INSTRUCTIONS
Discharge Instructions
 
General Discharge Information
You were seen/treated for:
Diabetic ketoacidosis
You had these procedures:
none
Watch for these problems:
Abdominal pain, nausa, vomiting, fast breathing, urine or bowel abnormalities
Special Instructions:
Follow up with your Endocrinologist-Dr. Gary pulido 1 week of discharge
 
Follow up with your PCP-Dr. Mini pulido 1 week of discharge
 
Diet
Recommended Diet: Diabetic
 
Activity
Full Activity/No Limits: Yes
 
Acute Coronary Syndrome
 
Inclusion Criteria
At DC or during hospital stay patient has or had the following:
ACS DIAGNOSIS No
 
Discharge Core Measures
Meds if any: Prescribed or Continued at Discharge
Meds if any: NOT Prescribed or Continued at Discharge
 
Congestive Heart Failure
 
Inclusion Criteria
At DC or during hospital stay patient has or had the following:
CHF DIAGNOSIS No
 
Discharge Core Measures
Meds if any: Prescribed or Continued at Discharge
Meds if any: NOT Prescribed or Continued at Discharge
 
Cerebrovascular accident
 
Inclusion Criteria
At DC or during hospital stay patient has or had the following:
CVA/TIA Diagnosis No
 
Discharge Core Measures
Meds if any: Prescribed or Continued at Discharge
Meds if any: NOT Prescribed or Continued at Discharge
 
Venous thromboembolism
 
Inclusion Criteria
VTE Diagnosis No
VTE Type NONE
VTE Confirmed by (Test) NONE
 
Discharge Core Measures
- Per Current guidelines, there needs to be overlap
- treatment for the first 5 days of Warfarin therapy.
- If discharged on Warfarin prior to 5 days of
- overlap therapy, the patient will need to be
- assessed for post discharge needs including
- *Post discharge parental anticoagulation
- *Warfarin and/or parental anticoagulation education
- *Follow up date to check INR post discharge
At least 5 days overlap therapy as Inpatient No
Meds if any: Prescribed or Continued at Discharge
Note: Overlap Therapy is Warfarin and Anticoagulant
Meds if any: NOT Prescribed or Continued at Discharge

## 2018-05-15 NOTE — DISCHARGE SUMMARY
Visit Information
 
Visit Dates
Admission Date:
05/12/18
 
Discharge Date:
05/16/18
 
 
Hospital Course
 
Course
Attending Physician:
Elsy Cristobal MD
 
Primary Care Physician:
Mini CULVER,Flynn LEWIS
 
[facility] Course:
Mr. Rodriguez is a 38-year-old male with past medical history significant for type
1 diabetes, hypertension, hyperlipidemia who presented to the ED with nausea, 
abdominal pain and nonbloody emesis, admitted to ICU with DKA
 
 
#DKA
Endocrinology and Nephrology were consulted. His labs were significant for a 
lactic acid of 3.9, creatinine of 1.6, anion gap of 36, potassium of 7.5 and a 
glucose of 812 that eventually normalized with normal saline IV fluids and 
insulin infusion. He was eventually transitioned to an insulin sliding scale and
Detemir insulin.  He was given Tigan/Metoclopramide for nausea.  He remained 
stable throughout his hospital course and downgraded to the general medical 
floor prior to discharge
 
 
#CHAZ - resolved
His CHAZ was suspected to be multifactorial, due to volume depletion, NSAID use 
and losartan.  His losartan was initially held but subsequently resumed with 
resolution of CHAZ
 
 
#Hyperkalemia with ECG changes - resolved
ECG showed wide QRS complexes and QTc prolongation.  Serial ECGs were done.  
Patient remained asymptomatic from a cardiac standpoint and he was administered 
IV calcium gluconate in the ED.  He did not require dialysis during his hospital
stay.
 
 
#History of IDDM and peripheral neuropathy
He was resumed on his pregabalin. UA demonstrated trace proteinuria
 
 
#Chronic medical conditions
Patient reported a history of back spasms on which he was on cyclobenzaprine 
that we continued in the hospital on an as needed basis.  He was resumed on his 
other home medications 
Allergies:
Coded Allergies:
Penicillins (ANAPHYLAXIS 01/12/18)
aspirin (ANAPHYLAXIS 01/12/18)
 
 
Disposition Summary
 
Disposition
Principal Diagnosis:
Diabetic ketoacidosis
Additional Diagnosis:
CHAZ
Lactic acidosis
Hyperkalemia
Discharge Disposition: home or self care
 
Discharge Instructions
 
General Discharge Information
Code Status: Full Code
Patient's Diet:
Diabetic
Patient's Activity:
As tolerated
Follow-Up Instructions/Appts:
Follow up with your Endocrinologist-Dr. Gary pulido 1 week of discharge
 
Follow up with your PCP-Dr. Mini pulido 1 week of discharge
 
Medications at Discharge
Discharge Medications:
Continue taking these medications:
Omeprazole (Omeprazole) 40 MG CAPSULE.
    1 Capsule ORAL DAILY
    Qty = 30
    Comments:
       Last Taken: 5/16/18
             Time: 0600AM
 
Insulin Lispro (Humalog Kwikpen U-100) 100 UNIT/ML INSULN.PEN
    0 Inject into fatty tissue 3 TIMES DAILY BEFORE MEALS
    Qty = 45
    Instructions:
       pre meal
         8u
       151-200 10u
       201-250 12u
       251-300 14u
       301-350 16u
       351-400 18u
       >400  20U and call MD
    Comments:
       Last Taken: 5/16/18
             Time: 1130AM
 
Pregabalin (Lyrica) 150 MG CAPSULE
    1 Capsule ORAL DAILY
    Qty = 30
    Comments:
       Last Taken: 5/16/18
             Time: 0800 AM
 
Ergocalciferol (Vitamin D2) (Vitamin D2) 50,000 UNIT CAPSULE
    1 Capsule ORAL EVERY 2 WEEKS
    Qty = 6
    Comments:
       NOT GIVEN IN HOSPIAL
 
Budesonide (Pulmicort Flexhaler) 180 MCG AER.POW.BA
    2 Puff Inhale through mouth TWICE DAILY
    Comments:
       SYMBICORT GIVEN 5/16/18 @0800AM
 
Insulin Detemir (Levemir) 100 UNIT/ML VIAL
    24 Units Inject into fatty tissue TWICE DAILY
    Comments:
       Last Taken: 5/16/18
             Time: 0800AM
 
Ibuprofen (Ibuprofen) 200 MG TABLET
    4 Tablet ORAL TWICE DAILY as needed for PAIN
    Comments:
       NOT GIVEN IN HOSPITAL
 
Acetaminophen (Acetaminophen) 500 MG TABLET
    3 Tablet ORAL EVERY SIX HOURS as needed for PAIN
    Comments:
       NOT GIVEN IN HOSPITAL
 
Losartan Potassium (Losartan Potassium) 25 MG TABLET
    1 Tablet ORAL DAILY
    Qty = 30
    Comments:
       Last Taken: 5/16/18
             Time: 0900AM
 
Dicyclomine HCl (Dicyclomine HCl) 10 MG CAPSULE
    1 Capsule ORAL THREE TIMES DAILY as needed for GI
    Qty = 30
    Comments:
       Last Taken: 5/14/18
             Time:  1300PM
 
Albuterol Sulfate (Ventolin Hfa) 90 MCG [id number]
    2 Puff Inhale through mouth EVERY 4-6 HOURS AS NEEDED as needed for 
SHORTNESS OF BREATH
    Qty = 1
    Comments:
       NOT GIVEN IN HOSPITAL
 
Cyclobenzaprine HCl (Cyclobenzaprine HCl) 10 MG TABLET
    1 Tablet ORAL THREE TIMES DAILY as needed for MUSCLE SPASMS
    Qty = 90
    Comments:
       Last Taken: 5/16/18
             Time: 0830AM 
 
Omega-3 Acid Ethyl Esters (Omega-3 Acid Ethyl Esters) 1 GRAM CAPSULE
    2 Capsule ORAL DAILY
    Qty = 60
    Comments:
       NOT GIVEN IN HOSPITAL
 
 
Copies To:
Caden CULVER,Flynn DERAS; Mini CULVER,Flynn LEWIS; Gary CULVER,[facility]

## 2018-05-15 NOTE — PN- ATT ADDEND
Attending Addendum
Attending Brief Note
Patient seen and examined, overall feeling much better.  Creatinine back to 
normal.  His hydration status has improved.  Potassium back to normal.
 
Vital Signs
 
 
Date Time Temp Pulse Resp B/P B/P Pulse O2 O2 Flow FiO2
 
     Mean Ox Delivery Rate 
 
05/15 0827  78  122/79     
 
05/15 0555 98.0 89 20 118/76  95 Room Air  
 
05/14 2201 98.0 100 18 120/78  96 Room Air  
 
05/14 1400 97.9 90 18 140/70  97 Room Air  
 
05/14 1307  97  150/77     
 
 
on exam; 
aox3, nad. 
cv; s1,s2 rrr
resp; clear
abd; soft, nt, bs+
ext; no edema
 
no labs. 
 
A/P: 38-year-old male with past medical history significant for type 1 diabetes,
hypertension, hyperlipidemia admitted to ICU with DKA, severe acidosis, severe 
hyperglycemia and guaiac positive stools, nausea vomiting. Also had high 
lactate.  Patient had severe hyperkalemia on admission which is improved.  
Overall things have improved.  Hyperkalemia resolved.  Patient's creatinine back
to normal and acidosis resolved.
Patient still had hyperglycemia with uncontrolled fingerstick glucose.  Insulin 
dose has been increased.  I discussed with nephrology and they recommended 
checking urine for protein.  Patient already on ARB.  I've ordered urinalysis.  
Patient would like to follow per Dr. Vega as an outpatient.  If his blood sugars
are under acceptable range tomorrow, likley discharge tomorrow.

## 2018-05-15 NOTE — PN- DIABETES
Assessment/Plan Diabetes
Assessment:
 38-year-old male with a known history of type 1 diabetes presented with 
diabetic ketoacidosis secondary to discontinuing his insulin at home after 
fighting with his mother.  He was in [facility] up to last Friday which is 2 days ago.
 His wife bailed him out.
 
Currently he is still on Levemir 18 units twice a day, Novolog coverage before 
meals and Novolog coverage at bedtime. His FSGs were 244, 347, 411 and 273. As 
per nurse,  he had extra food last night from outside of hospital.
 
Plan:
1. increase Levemir to 22 units twice  a day;
2. adjust Novolog coverage before meals; detail see the inpatient DM orders;
3. continue the current Novolog coverage at bedtime;
4. monitor FSGs.
5. be compliance with the diet.
will follow.
 
Inpatient Diabetes Orders
Before Each Meal:
   Bolus Insulin: Novolog
   < 80 mg/dl: no coverage
    mg/dl: 7 units
   101-120 mg/dl: 7 units
   121-150 mg/dl: 7 units
   151-200 mg/dl: 9 units
   201-250 mg/dl: 11 units
   251-300 mg/dl: 13 units
   301-350 mg/dl: 14 units
   351-400 mg/dl: 15 units
   > 400 mg/dl: 16 units
 
Subjective
Subjective:
As per his wife, he still has diarrhea.
 
Objective
Last 24 Hrs of Vital Signs/I&O
 Vital Signs
 
 
Date Time Temp Pulse Resp B/P B/P Pulse O2 O2 Flow FiO2
 
     Mean Ox Delivery Rate 
 
05/15 0555 98.0 89 20 118/76  95 Room Air  
 
05/14 2201 98.0 100 18 120/78  96 Room Air  
 
05/14 1400 97.9 90 18 140/70  97 Room Air  
 
05/14 1307  97  150/77     
 
 
 Intake & Output
 
 
 05/15 1600 05/15 0800 05/15 0000
 
Intake Total  220 
 
Output Total  400 
 
Balance  -180 
 
    
 
Intake, Oral  220 
 
Output, Urine  400 
 
Patient  231 lb 
 
Weight

## 2018-05-15 NOTE — PN- HOUSESTAFF
Subjective
Follow-up For:
DKA - resolved
CHAZ - resolved
Hyperkalemia with EKG changes - resolved
Subjective:
Patient offers no complaints.  He denies nausea, vomiting, abdominal pain, 
urinary or bowel symptoms
 
Review of Systems
Constitutional:
Reports: see HPI. 
 
Objective
Last 24 Hrs of Vital Signs/I&O
 Vital Signs
 
 
Date Time Temp Pulse Resp B/P B/P Pulse O2 O2 Flow FiO2
 
     Mean Ox Delivery Rate 
 
05/15 0827  78  122/79     
 
05/15 0555 98.0 89 20 118/76  95 Room Air  
 
 2201 98.0 100 18 120/78  96 Room Air  
 
 1400 97.9 90 18 140/70  97 Room Air  
 
 
 Intake & Output
 
 
 05/15 1600 05/15 0800 05/15 0000
 
Intake Total  220 
 
Output Total  400 
 
Balance  -180 
 
    
 
Intake, Oral  220 
 
Output, Urine  400 
 
Patient  231 lb 
 
Weight   
 
 
 
 
Physical Exam
General Appearance: Alert, Oriented X3, Cooperative, No Acute Distress
HEENT: Atraumatic, PERRLA, EOMI, Mucous Membr. moist/pink
Cardiovascular: Regular Rate, Normal S1, Normal S2
Lungs: Clear to Auscultation, Normal Air Movement
Abdomen: Normal Bowel Sounds, Soft, No Tenderness
Extremities: No Edema, No Tenderness/Swelling
Current Medications:
 Current Medications
 
 
  Sig/Rubina Start time  Last
 
Medication Dose Route Stop Time Status Admin
 
Acetaminophen 650 MG ONCE ONE  1900 DC 
 
  PO  190  1913
 
Albuterol Sulfate 2 PUF Q4P PRN  2230 AC 
 
  INH   
 
Budesonide/ 2 PUF BID  2100 AC 05/15
 
Formoterol Fumarate  INH   0827
 
Dicyclomine HCl 10 MG TID PRN  1130 AC 
 
  PO   1307
 
Heparin Sodium  5,000 UNIT Q8  2200 AC 
 
(Porcine)  SC   
 
Insulin Aspart 0 TIDAC/HS  1200 AC 05/15
 
  SC   1145
 
Insulin Detemir 22 UNITS BID 05/15 0900 AC 05/15
 
  SC   0827
 
Insulin Detemir 18 UNITS BID  0900 DC 
 
  SC   2116
 
Loperamide HCl 2 MG Q6P PRN  1130 AC 
 
  PO   1307
 
Losartan Potassium 25 MG DAILY  1048 AC 05/15
 
  PO   0827
 
Omeprazole 40 MG DAILY AC  1232 AC 05/15
 
  PO   0535
 
Pregabalin 150 MG DAILY 2030 AC 05/15
 
  PO   0827
 
Trimethobenzamide HCl 200 MG TID PRN  181 AC 
 
  IM   1820
 
 
 
 
Assessment/Plan
Assessment:
Mr. Rodriguez is a 38-year-old male with past medical history significant for type
1 diabetes, hypertension, hyperlipidemia admitted to ICU with DKA
 
Problem list:
DKA - resolved
CHAZ - resolved
Hyperkalemia with EKG changes - resolved
 
Plan:
TRC/nebs PRN
Continue Accu-Cheks and NovoLog on SS, Levemir
Continue pregabalin
UA for protein
Appreciate endocrinology recommendations
Appreciate nephrology recommendation
 
Diet: Diabetic
DVT ppx: sc Heparin
Code: Full
Problem List:
 1. Diabetic ketoacidosis
 
Pain Ratin
Pain Location:
NA
Pain Goal: Remain pain free
Pain Plan:
NA
Tomorrow's Labs & Rationales:
CBC, BEP

## 2018-05-16 NOTE — PN- DIABETES
Assessment/Plan Diabetes
Assessment:
38-year-old male with a known history of type 1 diabetes presented with diabetic
ketoacidosis secondary to discontinuing his insulin at home after fighting with 
his mother.  He was in long term up to last Friday which is 2 days ago.  His wife 
bailed him out.
 
Levemir was increased to 22 units twice a day, Novolog coverage before meals was
adjusted and he is on Novolog coverage at bedtime as well. His FSGs were 228, 
255 and 229.
 
 
 
Plan:
1. increase Levemir to 24 units twice  a day;
2. continue Novolog coverage before meals; 
3. continue the current Novolog coverage at bedtime;
4. monitor FSGs.
5. be compliance with the diet.
 
 
Most likely he will be discharged today. The discharge plan for diabetes: 
levemir 24 units twice a day;
Before Each Meal:
   Bolus Insulin: Novolog
   < 80 mg/dl: no coverage
    mg/dl: 8 units
   101-120 mg/dl: 8 units
   121-150 mg/dl: 8 units
   151-200 mg/dl: 10 units
   201-250 mg/dl: 12 units
   251-300 mg/dl: 14 units
   301-350 mg/dl: 16 units
   351-400 mg/dl: 18 units
 
f.u in office after discharge.
 
 
Subjective
Subjective:
He has no special complaints this morning.
 
Objective
Last 24 Hrs of Vital Signs/I&O
 Vital Signs
 
 
Date Time Temp Pulse Resp B/P B/P Pulse O2 O2 Flow FiO2
 
     Mean Ox Delivery Rate 
 
05/16 0754  78  122/80     
 
05/16 0655 98.1 88 18 130/82  95   
 
05/15 2116 98.0 88 18 138/88  96 Room Air  
 
 
 Intake & Output
 
 
 05/16 1600 05/16 0800 05/16 0000
 
Intake Total  240 1000
 
Output Total   400
 
Balance  240 600
 
    
 
Intake, Oral  240 1000
 
Output, Urine   400
 
Patient  215 lb 
 
Weight   
 
Weight  Bed scale 
 
Measurement   
 
Method   
 
 
 
 
Findings
Pertinent Lab/Rayshawn Results:
 Laboratory Tests
 
 
 05/16 05/15
 
 0740 1615
 
Chemistry  
 
  Sodium (137 - 145 mmol/L) 139 
 
  Potassium (3.5 - 5.1 mmol/L) 3.8 
 
  Chloride (98 - 107 mmol/L) 101 
 
  Carbon Dioxide (22 - 30 mmol/L) 27 
 
  Anion Gap (5 - 16) 11 
 
  BUN (9 - 20 mg/dL) 21  H 
 
  Creatinine (0.7 - 1.2 mg/dL) 0.7 
 
  Estimated GFR (>60 ml/min) > 60 
 
  BUN/Creatinine Ratio (7 - 25 %) 30.0  H 
 
Hematology  
 
  CBC w Diff NO MAN DIFF REQ 
 
  WBC (4.8 - 10.8 /CUMM) 7.1 
 
  RBC (4.70 - 6.10 /CUMM) 4.55  L 
 
  Hgb (14.0 - 18.0 G/DL) 13.0  L 
 
  Hct (42 - 52 %) 38.3  L 
 
  MCV (80.0 - 94.0 FL) 84.1 
 
  MCH (27.0 - 31.0 PG) 28.5 
 
  MCHC (33.0 - 37.0 G/DL) 33.9 
 
  RDW (11.5 - 14.5 %) 12.8 
 
  Plt Count (130 - 400 /CUMM) 280 
 
  MPV (7.4 - 10.4 FL) 7.6 
 
  Gran % (42.2 - 75.2 %) 53.3 
 
  Lymphocytes % (20.5 - 51.1 %) 37.0 
 
  Monocytes % (1.7 - 9.3 %) 6.8 
 
  Eosinophils % (0 - 5 %) 2.4 
 
  Basophils % (0.0 - 2.0 %) 0.5 
 
  Absolute Granulocytes (1.4 - 6.5 /CUMM) 3.8 
 
  Absolute Lymphocytes (1.2 - 3.4 /CUMM) 2.6 
 
  Absolute Monocytes (0.10 - 0.60 /CUMM) 0.5 
 
  Absolute Eosinophils (0.0 - 0.7 /CUMM) 0.2 
 
  Absolute Basophils (0.0 - 0.2 /CUMM) 0 
 
Urines  
 
  Urine Color (YEL,AMB,STR)  YEL
 
  Urine Clarity (CLEAR)  HAZY  H
 
  Urine pH (5.0 - 8.0)  6.0
 
  Ur Specific Gravity (1.001 - 1.035)  1.025
 
  Urine Protein (NEG,<30 MG/DL)  TRACE  H
 
  Urine Ketones (NEG)  NEG
 
  Urine Nitrite (NEG)  NEG
 
  Urine Bilirubin (NEG)  NEG
 
  Urine Urobilinogen (0.1  -  1.0 EU/dl)  0.2
 
  Ur Leukocyte Esterase (NEG)  NEG
 
  Ur Microscopic  SEDIMENT EXAMINED
 
  Urine RBC (0 - 5 /HPF)  RARE
 
  Urine WBC (0 - 2 /HPF)  RARE
 
  Urine Mucus (FEW,NONE)  FEW
 
  Urine Hemoglobin (NEG)  NEG
 
  Urine Glucose (N MG/DL)  >=1000  H

## 2018-05-16 NOTE — PN- HOUSESTAFF
**See Addendum**
Subjective
Follow-up For:
DKA
Subjective:
Patient reports lower back spasms. Denies nausea, vomiting, SOB, CP, abdominal 
pain, diarrhea or urinary frequency
 
Review of Systems
Constitutional:
Reports: see HPI. 
 
Objective
Last 24 Hrs of Vital Signs/I&O
 Vital Signs
 
 
Date Time Temp Pulse Resp B/P B/P Pulse O2 O2 Flow FiO2
 
     Mean Ox Delivery Rate 
 
 0754  78  122/80     
 
 0655 98.1 88 18 130/82  95   
 
05/15 2116 98.0 88 18 138/88  96 Room Air  
 
 
 Intake & Output
 
 
  1600  0800  0000
 
Intake Total  240 1000
 
Output Total   400
 
Balance  240 600
 
    
 
Intake, Oral  240 1000
 
Output, Urine   400
 
Patient  215 lb 
 
Weight   
 
Weight  Bed scale 
 
Measurement   
 
Method   
 
 
 
 
Physical Exam
General Appearance: Alert, Oriented X3, Cooperative, No Acute Distress, Obese
Cardiovascular: Regular Rate, Normal S1, Normal S2
Lungs: Clear to Auscultation, Normal Air Movement
Abdomen: Normal Bowel Sounds, Soft, No Tenderness
Extremities: No Edema
Current Medications:
 Current Medications
 
 
  Sig/Rubina Start time  Last
 
Medication Dose Route Stop Time Status Admin
 
Albuterol Sulfate 2 PUF Q4P PRN  2230 AC 
 
  INH   
 
Budesonide/ 2 PUF BID  2100 AC 
 
Formoterol Fumarate  INH   0755
 
Cyclobenzaprine HCl 10 MG ONCE ONE  0830 DC 
 
  PO  0831  0841
 
Cyclobenzaprine HCl 10 MG ONCE ONE  0030 DC 
 
  PO  0031  0035
 
Dicyclomine HCl 10 MG TID PRN  1130 AC 
 
  PO   1307
 
Heparin Sodium  5,000 UNIT Q8  2200 AC 
 
(Porcine)  SC   
 
Insulin Aspart 0 TIDAC/HS  1200 AC 
 
  SC   0754
 
Insulin Detemir 24 UNITS BID  0900 AC 
 
  SC   
 
Insulin Detemir 2 UNITS ONCE ONE  0845 UNVr 
 
  SC  0846  0840
 
Insulin Detemir 22 UNITS BID 05/15 0900 DC 
 
  SC   0754
 
Loperamide HCl 2 MG Q6P PRN  1130 AC 
 
  PO   1307
 
Losartan Potassium 25 MG DAILY  1048 AC 
 
  PO   0754
 
Omeprazole 40 MG DAILY AC  1232 AC 
 
  PO   0543
 
Pregabalin 150 MG DAILY  2030  
 
  PO   0754
 
Trimethobenzamide HCl 200 MG TID PRN  1815  
 
  IM   1820
 
 
 
 
Last 24 Hrs of Lab/Rayshawn Results
Last 24 Hrs of Labs/Mics:
 Laboratory Tests
 
18 0740:
Sodium Pending, Potassium Pending, Chloride Pending, Carbon Dioxide Pending, 
Anion Gap Pending, BUN Pending, Creatinine Pending, BUN/Creatinine Ratio Pending
, CBC w Diff Pending, WBC Pending, RBC Pending, Hgb Pending, Hct Pending, MCV 
Pending, MCH Pending, MCHC Pending, RDW Pending, Plt Count Pending, MPV Pending
 
05/15/18 1615:
Urine Color YEL, Urine Clarity HAZY  H, Urine pH 6.0, Ur Specific Gravity 1.025,
Urine Protein TRACE  H, Urine Ketones NEG, Urine Nitrite NEG, Urine Bilirubin 
NEG, Urine Urobilinogen 0.2, Ur Leukocyte Esterase NEG, Ur Microscopic SEDIMENT 
EXAMINED, Urine RBC RARE, Urine WBC RARE, Urine Mucus FEW, Urine Hemoglobin NEG,
Urine Glucose >=1000  H
 
 
Assessment/Plan
Assessment:
Mr. Rodriguez is a 38-year-old male with past medical history significant for type
1 diabetes, hypertension, hyperlipidemia admitted to ICU with DKA
 
Problem list:
DKA - resolved
CHAZ - resolved
Hyperkalemia with EKG changes - resolved
 
Plan:
TRC/nebs PRN
Continue Accu-Cheks and NovoLog on SS, Levemir
Continue pregabalin, dicyclomine, losartan
Give cyclobenzaparine 10 mg x 1 dose for back spasms
UA showed trace proteinuria
Appreciate endocrinology recommendations
Appreciate nephrology recommendation
 
Diet: Diabetic
DVT ppx: sc Heparin
Code: Full
 
Dispo: Home
Problem List:
 1. DKA (diabetic ketoacidoses)
 
Pain Ratin
Pain Location:
NA
Pain Goal: Remain pain free
Pain Plan:
NA
Tomorrow's Labs & Rationales:
none

## 2018-06-07 NOTE — ED GI/GU/ABDOMINAL COMPLAINT
History of Present Illness
 
General
Chief Complaint: Nausea, Vomiting, Diarrhea
Stated Complaint: NVD
Source: family, old records, EMS
Exam Limitations: no limitations
 
Vital Signs & Intake/Output
Vital Signs & Intake/Output
 Vital Signs
 
 
Date Time Temp Pulse Resp B/P B/P Pulse O2 O2 Flow FiO2
 
     Mean Ox Delivery Rate 
 
 0231 98.8 103 18 144/76  96   
 
/ 2333       Room Air  
 
 2316  110 22 123/74  97   
 
 
 
Allergies
Coded Allergies:
Penicillins (ANAPHYLAXIS 18)
aspirin (ANAPHYLAXIS 18)
 
Reconcile Medications
Acetaminophen 500 MG TABLET   3 TAB PO Q6 PRN PAIN  (Reported)
Albuterol Sulfate (Ventolin Hfa) 90 MCG HFA.AER.AD   2 PUF INH Q4-6 PRN PRN 
SHORTNESS OF BREATH
Budesonide (Pulmicort Flexhaler) 180 MCG AER.POW.BA   2 PUF INH BID ASTHMA  (
Reported)
Cyclobenzaprine HCl 10 MG TABLET   1 TAB PO TID PRN MUSCLE SPASMS  (Reported)
Dicyclomine HCl 10 MG CAPSULE   1 CAP PO TID PRN GI  (Reported)
Ergocalciferol (Vitamin D2) (Vitamin D2) 50,000 UNIT CAPSULE   1 CAP PO Q2W 
SUPPLEMENT  (Reported)
Ibuprofen 200 MG TABLET   4 TAB PO BID PRN PAIN  (Reported)
Insulin Detemir (Levemir) 100 UNIT/ML VIAL   24 UNITS SC BID DM  (Reported)
Insulin Lispro (Humalog Kwikpen U-100) 100 UNIT/ML INSULN.PEN   0 SC TIDAC DM  (
Reported)
     pre meal
       8u
     151-200 10u
     201-250 12u
     251-300 14u
     301-350 16u
     351-400 18u
     >400  20U and call MD
Losartan Potassium 25 MG TABLET   1 TAB PO DAILY BP  (Reported)
Omega-3 Acid Ethyl Esters 1 GRAM CAPSULE   2 CAP PO DAILY CHOLESTEROL  (Reported
)
Omeprazole 40 MG CAPSULE.DR   1 CAP PO DAILY GI  (Reported)
Pregabalin (Lyrica) 150 MG CAPSULE   1 CAP PO DAILY NERVE PAIN  (Reported)
 
Triage Note:
PER EMS AT HOME THIS AM FOR NVD, PT HX OF IDDM,
AND TBI FEW DAY HX OF NVD TONIGHT WEAK, DECREASED
URINE OUTPUT
PER EMS DEXI 289
Triage Nurses Notes Reviewed? yes
Onset: 1 day
Duration: day(s):, continues in ED, intermittent
Timing: recent history
Quality/Severity: aching, cramping, moderate, severe, vomiting
Location: generalized abdomen
Radiation: epigastric
Activities at Onset: rest
Prior Abdominal Problems: similar symptoms
Past Sexual History: Unobtainable at this time
Modifying Factors:
Worsens With: eating. 
Associated Symptoms: abdominal pain, diarrhea, loss of appetite, nausea/vomiting
HPI:
2 days prior to admission patient complains of nausea vomiting anorexia 
progressive weakness productive cough unable to tolerate food or fluids.  His 
spouse reports he also complained of abdominal discomfort with fever chills.
He denies diarrhea chest pain shortness breath headache dysuria rash bleeding.
 
Past History
 
Travel History
Traveled to [place] past 21 day No
 
Medical History
Any Pertinent Medical History? see below for history
Neurological: migraine
EENT: NONE
Cardiovascular: hypertension, hyperlipidemia
Respiratory: NONE
Gastrointestinal: GERD, irritable bowel syndrome
Hepatic: NONE
Renal: NONE
Musculoskeletal: NONE
Psychiatric: anxiety, depression, ADHD, probable intermittent explosive disorder
, remote history of alcohol use disorder, history of cannabis use disorder,
Endocrine: DM type 1
Blood Disorders: NONE
Cancer(s): NONE
GYN/Reproductive: NONE
History of MRSA: No
History of VRE: No
History of CDIFF: No
 
Surgical History
Surgical History: non-contributory
 
Psychosocial History
Who do you live with Spouse
Services at Home None
What is your primary language English
Tobacco Use: Cognitive Impairment
 
Family History
Family History, If Any:
FATHER, , Age 50-60; Cause: Cancer.
 
Hx Contributory? No
 
Review of Systems
 
Review of Systems
Constitutional:
Reports: see HPI, chills, fever, weakness. 
EENTM:
Reports: no symptoms. 
Respiratory:
Reports: see HPI, cough, sputum production. 
Cardiovascular:
Reports: no symptoms. 
GI:
Reports: see HPI, abdominal pain, nausea, vomiting. 
Genitourinary:
Reports: no symptoms. 
Musculoskeletal:
Reports: no symptoms. 
Skin:
Reports: no symptoms. 
Neurological/Psychological:
Reports: no symptoms. 
Hematologic/Endocrine:
Reports: no symptoms. 
Immunologic/Allergic:
Reports: no symptoms. 
All Other Systems: Reviewed and Negative
 
Physical Exam
 
Physical Exam
General Appearance: well developed/nourished, alert, awake, anxious, moderate 
distress
Head: atraumatic, normal appearance
Eyes:
Bilateral: normal appearance, PERRL, EOMI, normal inspection. 
Ears, Nose, Throat, Mouth: hearing grossly normal, dry mucous membranes
Neck: normal inspection, supple, full range of motion, normal alignment
Respiratory: chest non-tender, no respiratory distress, quiet respiration, 
crackles
Cardiovascular: regular rate/rhythm, normal peripheral pulses, tachycardia, 
norml femoral pulses equa
Peripheral Pulses:
4+ carotid (R), 4+ carotid (L)
Gastrointestinal: normal bowel sounds, soft, non-tender, no organomegaly
Male Genitals: normal genitalia
Back: normal inspection, normal range of motion, no vertebral tenderness
Extremities: normal range of motion, no ligament instability
Neurologic/Psych: no motor/sensory deficits, awake, alert, oriented x 3, normal 
gait, normal mood/affect, CNs II-XII nml as tested
Skin: intact, normal color, warm/dry
 
Core Measures
ACS in differential dx? No
Sepsis Present: No
Sepsis Focused Exam Completed? No
 
Progress
Differential Diagnosis: bowel obstruction, gastritis, pancreatitis, UTI/pyelo
Plan of Care:
 Orders
 
 
Procedure Date/time Status
 
Clear Liquid Diet  B Active
 
TROPONIN LEVEL  1200 Active
 
EKG  1200 Active
 
TROPONIN LEVEL  0600 Active
 
EKG  0600 Active
 
Pathway - chart  0321 Active
 
House Staff  0321 Active
 
Code Status  0321 Active
 
FingerStick- Glucose  0244 Active
 
URINE DRUGS OF ABUSE  0226 Active
 
URINALYSIS  0226 Active
 
STREP PNEUMO URINARY ANTIGEN  0224 Active
 
LEGIONELLA URINARY ANTIGEN  0224 Active
 
LOWER RESPIRATORY CULTURE  0224 Active
 
Patient Data  0150 Active
 
OXYGEN SETUP (GEN)  0128 Active
 
Saline Lock  0128 Active
 
Admit to inpatient  0128 Active
 
Vital Signs  0128 Active
 
Activity/Ambulation  0128 Active
 
BLOOD CULTURE  0128 Active
 
Code Status  0128 Complete
 
THYROID STIMULATING HORMONE  0018 Complete
 
LACTIC ACID  0018 Complete
 
Intake & Output  0006 Active
 
Lab Add-on Test   UNK Active
 
VTE Mechanical Prophylaxis   UNK Active
 
CT ABD & PELVIS W/O IV CONTRAS   UNK Active
 
TROPONIN LEVEL  2358 Complete
 
MAGNESIUM  235 Complete
 
LIPASE  2358 Complete
 
COMPREHENSIVE METABOLIC PANEL  2358 Complete
 
CBC WITHOUT DIFFERENTIAL  2358 Complete
 
ACETONE  2358 Complete
 
EKG  2358 Active
 
 
 Current Medications
 
 
  Sig/Rubina Start time  Last
 
Medication Dose  Stop Time Status Admin
 
Azithromycin 500 MG DAILY 900 AC 
 
(Zithromax)     
 
Sodium Chloride 250 ML    
 
(Normal Saline 0.9%)     
 
Enoxaparin Sodium 40 MG DAILY 900 UNVr 
 
(Lovenox)     
 
Insulin Detemir 24 UNITS  UNVr 
 
(Levemir)     
 
Losartan Potassium 25 MG DAILY 900 UNVr 
 
(Cozaar)     
 
Pregabalin 150 MG DAILY 900 UNVr 
 
(Lyrica)     
 
Insulin Aspart 0 TIDAC 800 UNVr 
 
(NovoLOG)     
 
Omeprazole 40 MG DAILY  UNVr 
 
(Prilosec)     
 
Insulin Detemir 12 UNITS ONCE  UNVr 
 
(Levemir)    034  
 
Sodium Chloride 1,000 ML Q10H  0330 AC 
 
(Normal Saline 0.9%)    232  
 
Ceftriaxone Sodium 1,000 MG DAILY  020 AC 
 
(Rocephin)     0223
 
Ciprofloxacin 400 MG ONCE ONE  013 CAN 
 
(Cipro)    013  
 
 
 Laboratory Tests
 
 
 
18 0018:
Anion Gap 12, Estimated GFR > 60, BUN/Creatinine Ratio 22.0, Glucose 273  H, 
Lactic Acid 1.2, Calcium 8.7, Magnesium 2.0, Total Bilirubin 0.7, AST 31, ALT 52
, Alkaline Phosphatase 117, Troponin I < 0.01, Total Protein 6.3, Albumin 3.4  L
, Globulin 2.9, Albumin/Globulin Ratio 1.2, Lipase < 10  L, TSH 0.556, CBC w 
Diff NO MAN DIFF REQ, RBC 4.23  L, MCV 82.7, MCH 28.6, MCHC 34.6, RDW 12.3, MPV 
7.2  L, Gran % 79.9  H, Lymphocytes % 11.9  L, Monocytes % 7.7, Eosinophils % 
0.1, Basophils % 0.4, Absolute Granulocytes 5.4, Absolute Lymphocytes 0.8  L, 
Absolute Monocytes 0.5, Absolute Eosinophils 0, Absolute Basophils 0, Acetone 
Level POSITIVE AT 1:4 DIL
 Microbiology
224  URINE ROUT: Legionella Antigen - ORD
224  URINE ROUT: Streptococcus pneumoniae Antigen (M - ORD
224  LOWER RESP: Respiratory Culture - ORD
06/08 0224  LOWER RESP: Gram Stain - ORD
 0157  BLOOD: Blood Culture - RECD
 0145  BLOOD: Blood Culture - RECD
 
 
Diagnostic Imaging:
Viewed by Me: Radiology Read.  Discussed w/RAD: Radiology Read. 
CXR Impression: Left lower lobe opacity suspicious for pneumonia. Radiographic 
followup after treatment/resolution of symptoms is recommended.
Initial ED EKG: normal axis, normal intervals, normal p-waves, normal QRS 
complex, normal sinus rhythm, no ST T wave changes
 
Departure
 
Departure
Disposition: STILL A PATIENT
Condition: Stable
Clinical Impression
Primary Impression: Pneumonia
Secondary Impressions: Acute hyperglycemia, Dehydration, Nausea & vomiting
Referrals:
M[facility] CULVER,Flynn LEWIS (PCP/Family)
 
Departure Forms:
Customer Survey
General Discharge Information
 
Admission Note
Spoke With:
Julián Brooks MD
Documentation of Exam:
Documentation of any treatments & extenuating circumstances including Concerns 
Regarding Discharge (functional status, medication knowledge or non-compliance, 
living conditions, etc.) that warrant an admission rather than observation: IV 
antibiotics IV antiemetics IV fluids advance diet medication adjustment 
continuing care discharge planning
 
 
Critical Care Note
 
Critical Care Note
Critical Care Time: 30-74 min (40)

## 2018-06-08 NOTE — HISTORY & PHYSICAL
Mary CULVER,Flynn 18 0156:
General Information and HPI
MD Statement:
I have seen and personally examined NEO CAMACHO and documented this H&P.
 
The patient is a 38 year old M who presented with a patient stated chief 
complaint of [nausea and vomiting].
 
Source of Information: patient, family, old records
Exam Limitations: no limitations
History of Present Illness:
Patient is a 38-year-old male with a PMH significant for type 1 diabetes 
mellitus, HTN, HLD, degenerative disc disease, sciatica, asthma, chronic 
diarrhea who presented to the [facility] ED with complaints of intractable
nausea and vomiting.  Symptoms began the morning prior to admission began with 
nausea and vomiting, he dined out at a fast food restaurant the night prior to 
onset of symptoms.  After he began vomiting he experienced dull aching abdominal
pain and left-sided chest pain.  He rates the pain as 7/10 made worse with 
positional changes and vomiting.   He experience progressive generalized 
weakness throughout the day and although he has had an appetite the smell of 
food has made him nauseous.  He has also had subjective fevers and chills.  He 
also notes that he has chronic postnasal drip which causes a chronic cough, 
however over the last 2 days he also reports worsening nasal congestion, 
worsening cough, and shortness of breath requiring increased use of his inhaler.
 He denies any syncope, palpitations, diaphoresis, lightheadedness.  The patient
has been taking ibuprofen daily for extended period of time.
 
Allergies/Medications
Allergies:
Coded Allergies:
Penicillins (ANAPHYLAXIS 18)
aspirin (ANAPHYLAXIS 18)
 
Home Med list
Acetaminophen 500 MG TABLET   3 TAB PO Q6 PRN PAIN  (Reported)
Albuterol Sulfate (Ventolin Hfa) 90 MCG HFA.AER.AD   2 PUF INH Q4-6 PRN PRN 
SHORTNESS OF BREATH
Budesonide (Pulmicort Flexhaler) 180 MCG AER.POW.BA   2 PUF INH BID ASTHMA  (
Reported)
Cyclobenzaprine HCl 10 MG TABLET   1 TAB PO TID PRN MUSCLE SPASMS  (Reported)
Dicyclomine HCl 10 MG CAPSULE   1 CAP PO TID PRN GI  (Reported)
Ergocalciferol (Vitamin D2) (Vitamin D2) 50,000 UNIT CAPSULE   1 CAP PO Q2W 
SUPPLEMENT  (Reported)
Ibuprofen 200 MG TABLET   4 TAB PO BID PRN PAIN  (Reported)
Insulin Detemir (Levemir) 100 UNIT/ML VIAL   22 UNITS SC BID DIABETES
Insulin Lispro (Humalog Kwikpen U-100) 100 UNIT/ML INSULN.PEN   0 SC SEE ADMIN 
CRITERIA DIABETES
     pre meal
       6u
     151-200 8u
     201-250 10u
     251-300 11u
     301-350 12u
     351-400 13u
     > 400 14u and call MD
Insulin Lispro (Humalog Kwikpen U-100) 100 UNIT/ML INSULN.PEN   0 SC SEE ADMIN 
CRITERIA DIABETES
     Bedtime glucose
      
     <250 0u
     251-300 2u
     301-350 3u
     351-400 3u
     > 400   4u
Loperamide HCl (Loperamide) 2 MG CAPSULE   1 TAB PO Q6P PRN DIARRHEA
Losartan Potassium 25 MG TABLET   1 TAB PO DAILY BP  (Reported)
Metoclopramide HCl 10 MG TABLET   1 TAB PO TID N/V
     Take 30 minutes before meals as needed for nausea and vomitting
Omega-3 Acid Ethyl Esters 1 GRAM CAPSULE   2 CAP PO DAILY CHOLESTEROL  (Reported
)
Omeprazole 40 MG CAPSULE.DR   1 CAP PO DAILY GI  (Reported)
Pregabalin (Lyrica) 150 MG CAPSULE   1 CAP PO DAILY NERVE PAIN  (Reported)
 
 
Past History
 
Travel History
Traveled to [place] past 21 day No
 
Medical History
Neurological: migraine
EENT: NONE
Cardiovascular: hypertension, hyperlipidemia
Respiratory: asthma
Gastrointestinal: GERD, irritable bowel syndrome
Hepatic: NONE
Renal: NONE
Musculoskeletal: NONE
Psychiatric: anxiety, depression, ADHD, probable intermittent explosive disorder
, remote history of alcohol use disorder, history of cannabis use disorder,
Endocrine: DM type 1
Blood Disorders: NONE
Cancer(s): NONE
GYN/Reproductive: NONE
History of MRSA: No
History of VRE: No
History of CDIFF: No
 
Surgical History
Surgical History: non-contributory
 
Past Family/Social History
 
Family History
Relations & Conditions if any
FATHER, , Age 50-60; Cause: Cancer.
 
 
Psychosocial History
Who Do You Live With? spouse, child
Services at Home: None
Primary Language: English
 
Functional Ability
ADLs
Independent: dressing, eating, toileting, bathing. 
Ambulation: independent
IADLs
Independent: shopping, housework, finances, food prep, telephone, transportation
, medication admin. 
 
Sexual History
Past Sexual History Unobtainable at this time
 
Review of Systems
 
Review of Systems
Constitutional:
Reports: chills, fever, weakness. 
EENTM:
Denies: blurred vision, double vision, visual changes. 
Cardiovascular:
Reports: chest pain.  Denies: palpitations, syncope. 
Respiratory:
Reports: cough, short of breath. 
GI:
Reports: abdominal pain, bloating, distention, nausea, vomiting.  Denies: 
changes in stool. 
Genitourinary:
Denies: dysuria, frequency. 
Musculoskeletal:
Reports: back pain (chronic), joint pain (chronic shoulder pain). 
Skin:
Reports: no symptoms. 
 
Exam & Diagnostic Data
Last 24 Hrs of Vital Signs/I&O
 Vital Signs
 
 
Date Time Temp Pulse Resp B/P B/P Pulse O2 O2 Flow FiO2
 
     Mean Ox Delivery Rate 
 
 0231 98.8 103 18 144/76  96   
 
 2333       Room Air  
 
 2316  110 22 123/74  97   
 
 
 Intake & Output
 
 
  0800  0000  1600
 
Intake Total 1000  
 
Output Total   
 
Balance 1000  
 
    
 
Intake, IV 1000  
 
 
 
 
Physical Exam
General Appearance Alert, Oriented X3, Cooperative, No Acute Distress
Skin Temp/Moisture Exam: Warm/Dry
Cardiovascular Regular Rate, Normal S1, Normal S2, mild tenderness to palpation 
of the L sided chest
Lungs scant rhonchi
Abdomen Normal Bowel Sounds, No Tenderness, mild distension
Neurological Normal Speech, Strength at 5/5 X4 Ext, Normal Tone, Sensation 
Intact, Cranial Nerves 3-12 NL
Extremities No Clubbing, No Cyanosis, No Edema
Vascular Normal Pulses, Pulses Symmetrical
Last 24 Hrs of Labs/Rayshawn:
 Laboratory Tests
 
18 0018:
Anion Gap 12, Estimated GFR > 60, BUN/Creatinine Ratio 22.0, Glucose 273  H, 
Lactic Acid 1.2, Calcium 8.7, Magnesium 2.0, Total Bilirubin 0.7, AST 31, ALT 52
, Alkaline Phosphatase 117, Troponin I < 0.01, Total Protein 6.3, Albumin 3.4  L
, Globulin 2.9, Albumin/Globulin Ratio 1.2, Lipase < 10  L, TSH 0.556, CBC w 
Diff NO MAN DIFF REQ, RBC 4.23  L, MCV 82.7, MCH 28.6, MCHC 34.6, RDW 12.3, MPV 
7.2  L, Gran % 79.9  H, Lymphocytes % 11.9  L, Monocytes % 7.7, Eosinophils % 
0.1, Basophils % 0.4, Absolute Granulocytes 5.4, Absolute Lymphocytes 0.8  L, 
Absolute Monocytes 0.5, Absolute Eosinophils 0, Absolute Basophils 0, Acetone 
Level POSITIVE AT 1:4 DIL
 Microbiology
224  URINE ROUT: Legionella Antigen - ORD
224  URINE ROUT: Streptococcus pneumoniae Antigen (M - ORD
224  LOWER RESP: Respiratory Culture - ORD
224  LOWER RESP: Gram Stain - ORD
 015  BLOOD: Blood Culture - RECD
 014  BLOOD: Blood Culture - RECD
 
 
 
Diagnostic Data
EKG Results
sinus tachycardia  nonspecific ST-T segment changes
CXR Results
Lung volumes are symmetric. There is a region of opacity in the left lower
lobe, suspicious for pneumonia. The right lung appears clear. No evidence of
pneumothorax, pleural effusion, or pulmonary edema.
 
The cardiomediastinal contour is unremarkable. No acute osseous findings are
seen.
 
IMPRESSION:
Left lower lobe opacity suspicious for pneumonia. Radiographic followup after
treatment/resolution of symptoms is recommended.
 
Assessment/Plan
Assessment:
Patient is a 38-year-old male with a PMH significant for type 1 diabetes 
mellitus, HTN, HLD, degenerative disc disease, sciatica, asthma, chronic 
diarrhea who presented to the [facility] ED with complaints of intractable
nausea and vomiting.  Symptoms began after eating fast food, he has not had 
associated diarrhea.  He also began having worsening nasal congestion, shortness
of breath, and cough over the last 2 days.  
 
Vital signs on admission: T 98.8, , RR 22, /74, pulse ox 97% on room
air
Labs: WBC 6.8, H/H 12.1/35.0, platelets 268, sodium 136, potassium 4.2, chloride
100, CO2 23, BUN 22, creatinine 1.0, glucose 273, acetone positive
 
Problem list
#Intractable nausea and vomiting likely secondary to acute gastritis, patient 
recently ate out at a fast food restaurant just prior to onset of symptoms, of 
note patient also takes ibuprofen daily
#Uncontrolled type 1 diabetes mellitus
#Nonspecific ST-T changes on EKG
#chronic medical problems including HTN, HLD, neuropathy, asthma, degenerative 
disc disease, sciatica
 
Plan
-Admit to general medicine floor
-CT abdomen and pelvis
-Repeat troponin EKG
-IV fluid hydration, advance diet as tolerated
-Continue home dose of Levemir 24 units twice daily, NovoLog sliding scale, Accu
-Cheks 3 times daily before meals and at bedtime
-IV antiemetics
-IV ceftriaxone and IV azithromycin for possible pneumonia, however patient is 
afebrile without white count, if patient remains clinically stable, can DC 
antibiotics
-Continue home medications
 
Diet: N.p.o., advance as tolerated
DVT prophylaxis: Lovenox, Alps
CODE STATUS: Full code
 
As Ranked By This Provider
Problem List:
 1. Acute gastritis
 
 
Core Measures/Misc ()
 
Acute Coronary Syndrome
ACS Diagnosis: No
 
Congestive Heart Failure
Congestive Heart Failure Diagnosis No
 
Cerebrovascular Accident
CVA/TIA Diagnosis: No
 
VTE (View Protocol)
VTE Risk Factors Acute Medical Illness
No Mechanical VTE Prophylaxis d/t N/A MechProphylax Ordered
No VTE Pharm Prophylaxis d/t NA PharmProphylax ordered
 
Sepsis (View protocol)
Sepsis Present: No
If YES complete Sepsis Event Note If YES complete Sepsis Event Note
 
 
Todd CULVER, [facility] 18 0532:
Core Measures/Misc ()
 
Sepsis (View protocol)
If YES complete Sepsis Event Note If YES complete Sepsis Event Note
 
Attending MD Review Statement
 
Attending Statement
Attending MD Statement: examined this patient, discuss w/resident/PA/NP, agreed 
w/resident/PA/NP, discussed with family, reviewed images, amended to note
Attending Assessment/Plan:
39 yo M with h/o T1DM with neuropathy recently admitted for DKA (May 2018), HTN,
DDD, sciatica, asthma, depression, ADHD, previous alcohol and cannabis use 
disorder, is here for evaluation of intractable nausea and vomiting for past 2 
days. Patient reports eating spicy chicken from [employer] 2 days ago, after which 
he started developing vomiting associated with abdominal pain. Subjective fever/
chills+. Poor appetite, unable to keep anything down. He denies diarrhea or 
constipation. He reports passing gas and feels bloated. He also developed 
pleuritic left sided chest pain and a dry cough with congestion with dyspnea 
over the past 24 hours. He has chronic post nasal drip.
 
Vitals stable except for tachycardia. Exam as above. Labs: no leukocytosis, Na 
136, BUN 22, glucose 273, lactic acid 1.2, trop negative, TSH normal, lipase <
10. Acetone positive 1:4.
CXR: left lower lobe opacity suspicious for pneumonia.
EKG: sinus tachycardia, TWI in III, AVF (no old EKG to compare), Qtc 469.
 
Assessment and plan:
1. Intractable nausea and vomiting possible acute gastroenteritis however no 
diarrheal episodes, need to rule out acute structural abdominal pathology.
2. URI symptoms with CXR suggestive LLL pneumonia
3. T1DM with neuropathy, not in DKA
4. Essential hypertension
5. Nonspecific EKG changes, pleuritic chest pain
 
- Admit to General medicine
- Clear liquid diet, advance diet as tolerated
- Anti-emetics PRN
- Gentle IV hydration
- Obtain CT abd/pelvis
- Panculture, urine legionella and strep Ag
- IV ceftriaxone and azithro, if he remains afebrile without leukocytosis would 
consider de-escalating abx
- Repeat EKG and troponin in AM
- Diabetes management  resume levemir (half of home dose), insulin DD, last A1c
was 8.3 in May 2018
- Check urinalysis and urine tox screen
DVT ppx Lovenox. Full code.
 
 
 
 
Kingsley CULVER,Ismail 18 0536:
Core Measures/Misc ()
 
Sepsis (View protocol)
If YES complete Sepsis Event Note If YES complete Sepsis Event Note
 
Resident Review Statement
Resident Statement: examined this patient, discussed with intern, agreed with 
intern
Other Findings:
38/M with a PMHx of type 1 IDDM, HTN, HLD, chronic back pain, sciatica, asthma, 
chronic diarrhea who presented with CC of intractable nausea and vomiting 
started yesterday. He reports eating  outside the night prior to onset of 
symptoms.  He reported associated LLQ &LUQ pain that started after vomiting, he 
is also complaining of 7/10, continuous, dull, nonradiating, left-sided chest 
pain that also started after vomiting.  Pain increased with movement, deep 
inhalation, and change of position.  He reports fevers and chills, worsening of 
baseline dry cough, exertional dyspnea, more frequent use of rescue inhaler 
without much symptom relief.  He denies sputum, constipation, diarrhea, or 
palpitation.  The patient is following with Dr. [email or web address] endocrinologist for 
uncontrollable type 1 diabetes.
 
Assessment:
The patient's shortness breath, worsening cough started prior to the nausea and 
vomiting.  X-ray suggestive of pneumonia.  He is on IV ceftriaxone and 
azithromycin.  Nausea and vomiting could be secondary to gastroenteritis.  
Gastritis is also possible given that he is using  a toltal of 1600 mg ibuprofen
daily.  He has hx of uncontrolled diabetes, peripheral neuropathy, and hx of 
intermittent self-limited nausea, vomiting which makes gastroparesis included in
the differential.  He has a nonspecific EKG changes, and pleuritic chest pain 
that's tender to touch, first troponin is negative, however we will rule out 
ACS.
 
Plan
 
#Pleuritic chest pain with a questionable pneumonia on x-ray
* Admit to general medicine floor
* Continue IV ceftriaxone and azithromycin
* Sputum culture, Urine Legionella and strep
* IV hydration
* TRC/nebs PRN
* Rule out ACS with serial troponin and EKG
 
#Intractable nausea and vomiting associated with abd pain likely simple 
gastroenteritis
* IV hydration
* Metoclopramide as needed for nausea, given that also for gastroparesis
* Pending CT abd/pelvis, given distended tender abdomen.
* Advance diet as tolerated
* Continue PPI
 
#T1DM poorly controlled with peripheral neuropathy
* We will give half Levemir dose given he is not tolerating oral intake
* NovoLog sliding scale
* Diabetic diet when tolerated
* Continue Lyrica 150 mg at bedtime
* Consider consulting endocrinology, as he reports uncontrollable DM.
 
#HTN
* Continue osartan
 
-NOP advance as toelrated 
-DVT PPx: ALPS & Lovenox
-FC

## 2018-06-08 NOTE — CONS- ENDOCRINOLOGY
General Information and HPI
 
Consulting Request
Date of Consult: 18
Requested By: medical team
Reason for Consult:
management of uncontrolled diabetes type 1
Source of Information: patient
Exam Limitations: no limitations
History of Present Illness:
Patient is a 38-year-old male with a PMH significant for type 1 diabetes 
mellitus, HTN, HLD, degenerative disc disease, sciatica, asthma, chronic 
diarrhea who presented to the [facility] ED with complaints of intractable
nausea and vomiting and hyperglycemia. X-ray suggested possible pneumonia and he
was put on iv antibiotics.
 
At home, he was on Levemir 24 units twice a day, Humalog coverage before meals (
FSG , 8 units; 151-200, 10 units; etc)
 
Currently he is on NS at 75 ml/hour, Levemir 24 units twice a day. He still 
feels nauseous, is on clear liquid diet w/o concentrated sweets. Novolog 
coverage before meals starts when his FSG is over 150 mg/dl. His FSG was 303 
this morning and 230 before lunch.
 
Allergies/Medications
Allergies:
Coded Allergies:
Penicillins (ANAPHYLAXIS 18)
aspirin (ANAPHYLAXIS 18)
 
Home Med List:
Acetaminophen 500 MG TABLET   3 TAB PO Q6 PRN PAIN  (Reported)
Albuterol Sulfate (Ventolin Hfa) 90 MCG HFA.AER.AD   2 PUF INH Q4-6 PRN PRN 
SHORTNESS OF BREATH
Budesonide (Pulmicort Flexhaler) 180 MCG AER.POW.BA   2 PUF INH BID ASTHMA  (
Reported)
Cyclobenzaprine HCl 10 MG TABLET   1 TAB PO TID PRN MUSCLE SPASMS  (Reported)
Dicyclomine HCl 10 MG CAPSULE   1 CAP PO TID PRN GI  (Reported)
Ergocalciferol (Vitamin D2) (Vitamin D2) 50,000 UNIT CAPSULE   1 CAP PO Q2W 
SUPPLEMENT  (Reported)
Ibuprofen 200 MG TABLET   4 TAB PO BID PRN PAIN  (Reported)
Insulin Detemir (Levemir) 100 UNIT/ML VIAL   24 UNITS SC BID DM  (Reported)
Insulin Lispro (Humalog Kwikpen U-100) 100 UNIT/ML INSULN.PEN   0 SC TIDAC DM  (
Reported)
     pre meal
       8u
     151-200 10u
     201-250 12u
     251-300 14u
     301-350 16u
     351-400 18u
     >400  20U and call MD
Losartan Potassium 25 MG TABLET   1 TAB PO DAILY BP  (Reported)
Omega-3 Acid Ethyl Esters 1 GRAM CAPSULE   2 CAP PO DAILY CHOLESTEROL  (Reported
)
Omeprazole 40 MG CAPSULE.   1 CAP PO DAILY GI  (Reported)
Pregabalin (Lyrica) 150 MG CAPSULE   1 CAP PO DAILY NERVE PAIN  (Reported)
 
 
Review of Systems
 
Review of Systems
Constitutional:
Reports: see HPI. 
Respiratory:
Reports: cough. 
GI:
Reports: nausea. 
Genitourinary:
Denies: dysuria. 
Hematologic/Endocrine:
Denies: polyuria, polydipsia. 
 
Past History
 
Travel History
Traveled to [place] past 21 day No
 
Medical History
Neurological: migraine
EENT: NONE
Cardiovascular: hypertension, hyperlipidemia
Respiratory: asthma
Gastrointestinal: GERD, irritable bowel syndrome
Hepatic: NONE
Renal: NONE
Musculoskeletal: NONE
Psychiatric: anxiety, depression, ADHD, probable intermittent explosive disorder
, remote history of alcohol use disorder, history of cannabis use disorder,
Endocrine: DM type 1
Blood Disorders: NONE
Cancer(s): NONE
GYN/Reproductive: NONE
 
Surgical History
Surgical History: non-contributory
 
Family History
Relations & Conditions If Any:
FATHER, , Age 50-60; Cause: Cancer.
 
 
Psychosocial History
Who Do You Live With? spouse, child
Services at Home: None
Primary Language: English
Smoking Status: Former Smoker
 
Functional Ability
ADLs
Independent: dressing, eating, toileting, bathing. 
Ambulation: independent
IADLs
Independent: shopping, housework, finances, food prep, telephone, transportation
, medication admin. 
 
Exam & Diagnostic Data
Last 24 Hrs of Vital Signs/I&O
 Vital Signs
 
 
Date Time Temp Pulse Resp B/P B/P Pulse O2 O2 Flow FiO2
 
     Mean Ox Delivery Rate 
 
 1036       Room Air  
 
 1035      94 Room Air  
 
 0946    127/84     
 
/08 0800       Room Air  
 
06/08 0600 99.1 107 18 144/78  92 Room Air  
 
/08 0553       Room Air  
 
/08 0423       Room Air  
 
/08 0418 98.7 100 18 139/76  97 Room Air  
 
/08 0231 98.8 103 18 144/76  96   
 
/07 2333       Room Air  
 
06/ 2316  110 22 123/74  97   
 
 
 Intake & Output
 
 
 /08 1600 /08 0800 06/08 0000
 
Intake Total  1200 
 
Output Total  700 
 
Balance  500 
 
    
 
Intake, IV  1200 
 
Output, Urine  700 
 
Patient  229 lb 
 
Weight   
 
Weight  Bed scale 
 
Measurement   
 
Method   
 
 
 
 
Physical Exam
General Appearance: no apparent distress
Neck: normal inspection
Respiratory: crackles (minimal)
Cardiovascular: tachycardia
Extremities: normal inspection, no edema
Labs/Rayshawn Results:
 Laboratory Tests
 
 
 
 
 1105 0711 0500
 
Chemistry   
 
  Troponin I (<0.11 ng/ml) < 0.01 < 0.01 
 
Toxicology   
 
  Urine Opiates Screen (>2000 NG/ML)   < 100
 
  Methadone Screen (>300 NG/ML)   47
 
  Barbiturate Screen (>200 NG/ML)   < 60
 
  Ur Phencyclidine Scrn (>25 NG/ML)   < 6.00
 
  Amphetamines Screen (>1000 NG/ML)   < 100
 
  U Benzodiazepines Scrn (>200 NG/ML)   < 85
 
  Urine Cocaine Screen (>300 NG/ML)   < 50
 
  Urine Cannabis Screen (>50 NG/ML)   79.50  H
 
Urines   
 
  Urine Color (YEL,AMB,STR)   YEL
 
  Urine Clarity (CLEAR)   CLEAR
 
  Urine pH (5.0 - 8.0)   6.0
 
  Ur Specific Gravity (1.001 - 1.035)   >= 1.030
 
  Urine Protein (NEG,<30 MG/DL)   30  H
 
  Urine Ketones (NEG)   40  H
 
  Urine Nitrite (NEG)   NEG
 
  Urine Bilirubin (NEG)   NEG
 
  Urine Urobilinogen (0.1  -  1.0 EU/dl)   0.2
 
  Ur Leukocyte Esterase (NEG)   NEG
 
  Ur Microscopic   SEDIMENT EXAMINED
 
  Urine RBC (0 - 5 /HPF)   1-3
 
  Urine WBC (0 - 2 /HPF)   1-3  H
 
  Urine Bacteria (NEG/NONE)   FEW  H
 
  Urine Hemoglobin (NEG)   SMALL  H
 
  Urine Glucose (N MG/DL)   >=1000  H
 
 
 
 
 
 
 0018
 
Chemistry 
 
  Sodium (137 - 145 mmol/L) 136  L
 
  Potassium (3.5 - 5.1 mmol/L) 4.2
 
  Chloride (98 - 107 mmol/L) 100
 
  Carbon Dioxide (22 - 30 mmol/L) 23
 
  Anion Gap (5 - 16) 12
 
  BUN (9 - 20 mg/dL) 22  H
 
  Creatinine (0.7 - 1.2 mg/dL) 1.0
 
  Estimated GFR (>60 ml/min) > 60
 
  BUN/Creatinine Ratio (7 - 25 %) 22.0
 
  Glucose (65 - 99 mg/dL) 273  H
 
  Lactic Acid (0.7 - 2.1 mmol/L) 1.2
 
  Calcium (8.4 - 10.2 mg/dL) 8.7
 
  Magnesium (1.6 - 2.3 mg/dL) 2.0
 
  Total Bilirubin (0.2 - 1.3 mg/dL) 0.7
 
  AST (17 - 59 U/L) 31
 
  ALT (21 - 72 U/L) 52
 
  Alkaline Phosphatase (< 127 U/L) 117
 
  Troponin I (<0.11 ng/ml) < 0.01
 
  Total Protein (6.3 - 8.2 g/dL) 6.3
 
  Albumin (3.5 - 5.0 g/dL) 3.4  L
 
  Globulin (1.9 - 4.2 gm/dL) 2.9
 
  Albumin/Globulin Ratio (1.1 - 2.2 %) 1.2
 
  Lipase (23 - 300 U/L) < 10  L
 
  TSH (0.270 - 4.200 uIU/mL) 0.556
 
Hematology 
 
  CBC w Diff NO MAN DIFF REQ
 
  WBC (4.8 - 10.8 /CUMM) 6.8
 
  RBC (4.70 - 6.10 /CUMM) 4.23  L
 
  Hgb (14.0 - 18.0 G/DL) 12.1  L
 
  Hct (42 - 52 %) 35.0  L
 
  MCV (80.0 - 94.0 FL) 82.7
 
  MCH (27.0 - 31.0 PG) 28.6
 
  MCHC (33.0 - 37.0 G/DL) 34.6
 
  RDW (11.5 - 14.5 %) 12.3
 
  Plt Count (130 - 400 /CUMM) 268
 
  MPV (7.4 - 10.4 FL) 7.2  L
 
  Gran % (42.2 - 75.2 %) 79.9  H
 
  Lymphocytes % (20.5 - 51.1 %) 11.9  L
 
  Monocytes % (1.7 - 9.3 %) 7.7
 
  Eosinophils % (0 - 5 %) 0.1
 
  Basophils % (0.0 - 2.0 %) 0.4
 
  Absolute Granulocytes (1.4 - 6.5 /CUMM) 5.4
 
  Absolute Lymphocytes (1.2 - 3.4 /CUMM) 0.8  L
 
  Absolute Monocytes (0.10 - 0.60 /CUMM) 0.5
 
  Absolute Eosinophils (0.0 - 0.7 /CUMM) 0
 
  Absolute Basophils (0.0 - 0.2 /CUMM) 0
 
Toxicology 
 
  Acetone Level (NEGATIVE) POSITIVE AT 1:4 DIL
 
 
 
 
Assessment/Plan
Assessment/Plan
Patient is a 38-year-old male with a PMH significant for type 1 diabetes 
mellitus, HTN, HLD, degenerative disc disease, sciatica, asthma, chronic 
diarrhea who presented to the [facility] ED with complaints of intractable
nausea and vomiting and hyperglycemia. X-ray suggested possible pneumonia and he
was put on iv antibiotics.
 
DM management:
1. continue Levemir 24 units twice  a day;
2. continue the current Novolog coverage before meals;
3. if he tolerates the clear liquid diet well and diet can be advanced to 
consistent carbohydrtaes 1 diet, then his Novolog coverage before meals will be 
adjusted to the following scale:
- FSG , 6 units
---151-200, 8 units
---201-250, 10 units
---251-300, 12 units
---301-350, 14 units
---351-400, 16 units
---> 400, 18 units.
4. add Novolog coverage at bedtime
- FSG , no coverage
---151-200, no coverage
---201-250, no coverage
---251-300, 2 units
---301-350, 3  units
---351-400, 4 units
---> 400, 5 units.
5. monitor FSGs.
will follow.
 
 
Consult Acknowledgment
- Thank you for your consult request.

## 2018-06-08 NOTE — RADIOLOGY REPORT
EXAMINATION:
XR CHEST
 
CLINICAL INFORMATION:
Productive cough, nausea/vomiting, fever
 
COMPARISON:
3/14/2017
 
TECHNIQUE:
2 views of the chest were obtained.
 
FINDINGS:
Lung volumes are symmetric. There is a region of opacity in the left lower
lobe, suspicious for pneumonia. The right lung appears clear. No evidence of
pneumothorax, pleural effusion, or pulmonary edema.
 
The cardiomediastinal contour is unremarkable. No acute osseous findings are
seen.
 
IMPRESSION:
Left lower lobe opacity suspicious for pneumonia. Radiographic followup after
treatment/resolution of symptoms is recommended.

## 2018-06-08 NOTE — CT SCAN REPORT
EXAMINATION:
CT ABDOMEN AND PELVIS WITHOUT CONTRAST
 
CLINICAL INFORMATION:
Abdominal distention and tenderness. Intractable nausea and vomiting.
 
COMPARISON:
04/12/2011
 
TECHNIQUE:
Multidetector volumetric imaging was performed from the superior aspect of
the liver through the pubic symphysis. Sagittal and coronal reformatted
images were obtained on the technologist's workstation.
 
DLP:
1129.01 mGy-cm
 
FINDINGS:
 
LUNG BASES: Patchy consolidation with air bronchograms involving the left
lower lobe. Moderate dependent changes in the right lower lobe without
suspicious consolidation.
 
LIVER, GALLBLADDER, AND BILIARY TREE: The liver is normal in size, shape, and
attenuation. No focal hepatic lesion or biliary ductal dilatation is present.
The gallbladder is unremarkable with no evidence of radiopaque gallstones,
gallbladder wall thickening, or obvious pericholecystic inflammatory changes.
 
 
PANCREAS: Unremarkable.
 
SPLEEN: Unremarkable.
 
ADRENAL GLANDS: Unremarkable.
 
KIDNEYS AND URETERS: The kidneys are normal in size, shape, and attenuation.
No hydronephrosis, hydroureter, or calculi seen. No perinephric stranding.
 
BLADDER: Unremarkable.
 
GASTROINTESTINAL TRACT: The small and large bowel are unremarkable. The
appendix is unremarkable.
 
ABDOMINAL WALL: No significant hernia is appreciated.
 
LYMPH NODES: No lymphadenopathy within the abdomen or pelvis by CT criteria.
 
VASCULAR: No abdominal aortic aneurysm.
 
PELVIC VISCERA: Unremarkable.
 
OSSEOUS STRUCTURES: Unremarkable.
 
IMPRESSION:
1. Limited evaluation without IV contrast. No definite acute abdominal or
pelvic abnormality.
 
2. Focal consolidation within the left lower lobe with air bronchograms not
suspicious for an infectious process. Follow-up to resolution is recommended.

## 2018-06-08 NOTE — EVENT NOTE
**See Addendum**
Event Note
Event Note:
S:Seen and examined pt this morning. Complains of nausea this morning, unable to
eat. Denies further vomiting
O: Afebrile, vitals stable, rest of examination unremarkable
 
A/p:
 
38-year-old gentleman with a PMH significant for TBI, type 1 diabetes mellitus, 
HTN, HLD, degenerative disc disease, sciatica, asthma, chronic diarrhea current 
admission for intractable nausea and vomiting and hyperglycemia
 
Intractable nausea and vomiting 
likely secondary to acute gastritis Vs gastroparesis
continue PPI
will start reglan, Qtc 478
advance diet as tolerated currently on clears
on IVF NS @125, decrease on PO intake improves
CT/ABD pelvis No definite acute abdominal or pelvic abnormality.
 
 
Uncontrolled type 1 diabetes mellitus
accuchecks 30 3, 285
Endo consulted, appreciate recomendations
 
 
Nonspecific ST-T changes on EKG
likely rate related 
trop neg x2 
 
Left lower lobe opacity suspicious for pneumonia.
On CT scan focal consolidation within the left lower lobe with air bronchograms 
not
suspicious for an infectious process, continues to be afebrile

## 2018-06-08 NOTE — ADMISSION CERTIFICATION
Admission Certification
 
Certification Statement
- As attending physician, I certify that at the time of
- admission, based on clinical presentation, severity of
- symptoms, need for further diagnostic testing and
- therapeutic interventions, and risk of adverse outcomes
- without in-hospital treatment, in my clinical assessment,
- this patient requires an acute hospital stay for a minimum
- of two nights or longer. I have also considered psychsocial
- factors such as support system, advanced age, financial
- issues, cognitive issues, and failed out-patient treatments,
- past re-admission history, safety of patient, and lack of
- compliance as applicable.
Specific rationale supporting this admission is:
Intractable nausea and vomiting, community acquired pneumonia.

## 2018-06-09 NOTE — PN- HOUSESTAFF
Subjective
Follow-up For:
nausea/vomiting
Subjective:
patient seen and examined. reports feeling better. offers no complaints. 
 
Review of Systems
Constitutional:
Reports: no symptoms. 
 
Objective
Last 24 Hrs of Vital Signs/I&O
 Vital Signs
 
 
Date Time Temp Pulse Resp B/P B/P Pulse O2 O2 Flow FiO2
 
     Mean Ox Delivery Rate 
 
 1000    144/82     
 
/ 0620 99.1 100 20 144/82  94 Room Air  
 
/ 0000       Room Air  
 
 2220 97.1 100 20 140/70  93   
 
/08 1600       Room Air  
 
/08 1358 98.7 100 20 124/80  96 Room Air  
 
 
 Intake & Output
 
 
  1600  0800 06/09 0000
 
Intake Total  500 1355
 
Output Total  350 400
 
Balance  150 955
 
    
 
Intake, IV  300 875
 
Intake, Oral  200 480
 
Output,  150 
 
Emesis   
 
Output, Urine  200 400
 
 
 
 
Physical Exam
General Appearance: Alert, Oriented X3, Cooperative, Mild Distress
Skin: No Rashes, No Breakdown
Skin Temp/Moisture Exam: Warm/Dry
Sepsis Skin Exam (color): Normal for Ethnicity
HEENT: Atraumatic
Cardiovascular: Normal S1, Normal S2, No Murmurs
Lungs: Clear to Auscultation, Normal Air Movement
Abdomen: Soft, No Tenderness
Neurological: Normal Speech
Extremities: No Edema
 
Assessment/Plan
Assessment:
38-year-old gentleman with a PMH significant for TBI, type 1 diabetes mellitus, 
HTN, HLD, degenerative disc disease, sciatica, asthma, chronic diarrhea 
presented to the ED with complains of Nausea and vomitting. 
 
Assessment and Plan:
 
Nausea and vomiting 
 
Improving. L
continue PPI
Switch Reglan to PO TID before meals.
diet advanced to regular today.
d/c IVF
CT/ABD pelvis No definite acute abdominal or pelvic abnormality.
 
 
Uncontrolled type 1 diabetes mellitus
 
- Insulin SS adjusted per Endo recs
- levemir increased to 22 units BID
- Accucheks
 
 
Nonspecific ST-T changes on EKG
 
trop neg x2 on admission
 
Left lower lobe opacity suspicious for pneumonia.
On CT scan focal consolidation within the left lower lobe with air bronchograms 
not
suspicious for an infectious process
 
- afebrile
- watch off abx
 
Diet: Regular
DVT PPX: SC Enox
Code: Full Code
Problem List:
 1. Acute gastritis
 
Pain Ratin
Pain Location:
none
Pain Goal: Remain pain free
Pain Plan:
none
Tomorrow's Labs & Rationales:
BEP

## 2018-06-09 NOTE — PN- ATT ADDEND
Attending Addendum
Attending Brief Note
Patient seen and examined.  Plan of care discussed with the medical team and the
patient.  Available lab work and radiology test reports were reviewed.  Patient 
overall feels improved and nausea vomiting has decreased however he was nauseous
again this morning and vomited small amount.  Denies any recent fever chills or 
chest pain difficulty breathing or abdominal pain.
 
Assessment
* Nausea and vomiting likely due to gastroparesis related to diabetes
* Diabetes type 1- poorly controlled with hyperglycemia with glucose levels 313 
by Accu-Chek this morning
* History of peripheral neuropathy
* History of hypertension 
* Suspected pneumonia based on findings on CT scan and chest x-ray- although 
patient does not have any fever or the BBC count elevation and does not have any
clinical symptoms 
 
 Plan
* Change Reglan to oral and give 10 mg half-hour before meals
* Out of bed and ambulate today
* Monitor for nausea and vomiting and oral intake
* Possible discharge home tomorrow if patient is able to tolerate his diet
* Change  diet to solid food
* Follow endocrinology recommendations
 
Exam:
General: Patient awake alert oriented without any distress 
CVS: S1 plus S2 without any murmur or gallops 
Chest: Few scattered crepitation without any wheeze.  There is no respiratory 
distress. 
Abdomen: Soft non-tender, bowel sound present, no guarding or rebound 
CNS: Awake alert oriented without any focal neuro deficit and follows commands  
appropriately 
Extremities: No edema; no clubbing or cyanosis noted 
 
 Current Medications
 
 
  Sig/Rubina Start time  Last
 
Medication Dose Route Stop Time Status Admin
 
Acetaminophen 650 MG Q6-PRN PRN 06/08 1730 AC 06/08
 
  PO   1746
 
Albuterol Sulfate 3 ML Q4P PRN 06/08 1045 AC 
 
  INH   
 
Azithromycin 500 MG DAILY 06/08 0900 DC 06/08
 
Sodium Chloride 250 ML IV   0942
 
Ceftriaxone Sodium 1,000 MG DAILY 06/08 0900 DC 06/08
 
  IV   0941
 
Enoxaparin Sodium 40 MG DAILY 06/08 0900 AC 06/08
 
  SC   0940
 
Insulin Aspart 0 TIDAC 06/08 0800 AC 06/08
 
  SC   1713
 
Insulin Detemir 24 UNITS BID 06/08 2100 DC 
 
  SC   
 
Insulin Detemir 18 UNITS BID 06/08 2100 AC 06/08
 
  SC   2051
 
Losartan Potassium 25 MG DAILY 06/08 0900 AC 06/08
 
  PO   0946
 
Metoclopramide HCl 10 MG TIDAC 06/08 0945 CAN 
 
  PO   
 
Metoclopramide HCl 10 MG TIDAC 06/08 0945 AC 06/08
 
  IV   1713
 
Omeprazole 40 MG DAILY AC 06/08 0700 AC 06/09
 
  PO   0614
 
Patient Medication  1 ED ONE ONE 06/08 1330 IA 06/08
 
Teaching  ED 06/08 1331  1329
 
Pregabalin 150 MG DAILY 06/08 0900  06/08
 
  PO   0948
 
Sodium Chloride 1,000 ML Q10H 06/08 0330 DC 06/08
 
  IV 06/08 2129  1648
 
 
Laboratory Tests
 
 
 
06/08/18 1105:
Troponin I < 0.01
 
06/08/18 0711:
Troponin I < 0.01
 
06/08/18 0500:
Urine Opiates Screen < 100, Methadone Screen 47, Barbiturate Screen < 60, Ur 
Phencyclidine Scrn < 6.00, Amphetamines Screen < 100, U Benzodiazepines Scrn < 
85, Urine Cocaine Screen < 50, Urine Cannabis Screen 79.50  H, Urine Color YEL, 
Urine Clarity CLEAR, Urine pH 6.0, Ur Specific Gravity >= 1.030, Urine Protein 
30  H, Urine Ketones 40  H, Urine Nitrite NEG, Urine Bilirubin NEG, Urine 
Urobilinogen 0.2, Ur Leukocyte Esterase NEG, Ur Microscopic SEDIMENT EXAMINED, 
Urine RBC 1-3, Urine WBC 1-3  H, Urine Bacteria FEW  H, Urine Hemoglobin SMALL  
H, Urine Glucose >=1000  H
 
06/08/18 0018:
Anion Gap 12, Estimated GFR > 60, BUN/Creatinine Ratio 22.0, Glucose 273  H, 
Lactic Acid 1.2, Calcium 8.7, Magnesium 2.0, Total Bilirubin 0.7, AST 31, ALT 52
, Alkaline Phosphatase 117, Troponin I < 0.01, Total Protein 6.3, Albumin 3.4  L
, Globulin 2.9, Albumin/Globulin Ratio 1.2, Lipase < 10  L, TSH 0.556, CBC w 
Diff NO MAN DIFF REQ, RBC 4.23  L, MCV 82.7, MCH 28.6, MCHC 34.6, RDW 12.3, MPV 
7.2  L, Gran % 79.9  H, Lymphocytes % 11.9  L, Monocytes % 7.7, Eosinophils % 
0.1, Basophils % 0.4, Absolute Granulocytes 5.4, Absolute Lymphocytes 0.8  L, 
Absolute Monocytes 0.5, Absolute Eosinophils 0, Absolute Basophils 0, Acetone 
Level POSITIVE AT 1:4 DIL
 Microbiology
06/08 0500  URINE ROUT: Legionella Antigen - COMP
06/08 0500  URINE ROUT: Streptococcus pneumoniae Antigen (M - COMP
06/08 0224  LOWER RESP: Respiratory Culture - COLB
06/08 0224  LOWER RESP: Gram Stain - COLB
06/08 0157  BLOOD: Blood Culture - RECD
06/08 0145  BLOOD: Blood Culture - RECD
 
 Vital Signs
 
 
Date Time Temp Pulse Resp B/P B/P Pulse O2 O2 Flow FiO2
 
     Mean Ox Delivery Rate 
 
06/09 0620 99.1 100 20 144/82  94 Room Air  
 
06/09 0000       Room Air  
 
06/08 2220 97.1 100 20 140/70  93   
 
06/08 1600       Room Air  
 
06/08 1358 98.7 100 20 124/80  96 Room Air  
 
06/08 1036       Room Air  
 
06/08 1035      94 Room Air  
 
06/08 0946    127/84     
 
 
 Intake & Output
 
 
 06/09 1600 06/09 0800 06/09 0000
 
Intake Total  500 1355
 
Output Total  350 400
 
Balance  150 955
 
    
 
Intake, IV  300 875
 
Intake, Oral  200 480
 
Output,  150 
 
Emesis   
 
Output, Urine  200 400
 
 
 Laboratory Tests
 
 
 06/08
 
 1105
 
Chemistry 
 
  Troponin I (<0.11 ng/ml) < 0.01

## 2018-06-10 NOTE — PN- DIABETES
Assessment/Plan Diabetes
Assessment:
Patient is eating okay.  Apparently however he does develop frequent bowel 
movements.  Has had no nausea or vomiting.  His blood sugar this morning is 121.
 He is on 22 units of Levemir twice a day as well as sliding scale NovoLog.
 
Plan:
Suggest continue the present insulin.  Continue to check sugars 4 times a day.  
The patient is being evaluated with regard to his frequent bowel movements.
 
Subjective
Subjective:
Has abdominal cramping
 
Review of Systems
Constitutional:
Denies: chills, fever. 
Cardiovascular:
Denies: chest pain. 
Respiratory:
Denies: cough, short of breath. 
Gastrointestinal:
Reports: diarrhea.  Denies: vomiting. 
Skin:
Reports: no symptoms. 
 
Objective
Last 24 Hrs of Vital Signs/I&O
 Vital Signs
 
 
Date Time Temp Pulse Resp B/P B/P Pulse O2 O2 Flow FiO2
 
     Mean Ox Delivery Rate 
 
06/10 0540 97.9 94 20 128/78  96   
 
06/10 0000       Room Air  
 
06/09 2144 99.0 109 18 124/75  95   
 
06/09 1904      96  Room Air 
 
06/09 1413 98.6 90 18 122/60  96 Room Air  
 
 
 Intake & Output
 
 
 06/10 1600 06/10 0800 06/10 0000
 
Intake Total  100 100
 
Output Total   
 
Balance  100 100
 
    
 
Intake, Oral  100 100
 
Number  2 5
 
Bowel   
 
Movements   
 
 
 Vital Signs
 
 
Date Time Temp Pulse Resp B/P B/P Pulse O2 O2 Flow FiO2
 
     Mean Ox Delivery Rate 
 
06/10 0540 97.9 94 20 128/78  96   
 
06/10 0000       Room Air  
 
06/09 2144 99.0 109 18 124/75  95   
 
06/09 1904      96  Room Air 
 
06/09 1413 98.6 90 18 122/60  96 Room Air  
 
 
 Intake & Output
 
 
 06/10 1600 06/10 0800 06/10 0000
 
Intake Total  100 100
 
Output Total   
 
Balance  100 100
 
    
 
Intake, Oral  100 100
 
Number  2 5
 
Bowel   
 
Movements   
 
 
 
 
Physical Exam
General Appearance: alert, awake
Neck: normal inspection
Respiratory: normal breath sounds
Abdomen: normal bowel sounds, soft
Current Medications:
 Current Medications
 
 
  Sig/Rubina Start time  Last
 
Medication Dose Route Stop Time Status Admin
 
Acetaminophen 650 MG .STK-MED ONE 06/09 1730 DC 
 
  PO 06/09 1731  
 
Acetaminophen 650 MG Q6-PRN PRN 06/08 1730 AC 06/10
 
  PO   0446
 
Albuterol Sulfate 2 PUF Q4P PRN 06/09 1900 AC 
 
  INH   
 
Albuterol Sulfate 3 ML Q4P PRN 06/08 1045 DC 06/09
 
  INH   1837
 
Benzonatate 100 MG TID 06/09 1707 AC 06/09
 
  PO   2219
 
Enoxaparin Sodium 40 MG DAILY 06/08 0900 AC 06/09
 
  SC   1000
 
Insulin Aspart 0 TIDAC/HS 06/09 1210 AC 06/10
 
  SC   0917
 
Insulin Aspart 0 TIDAC 06/08 0800 DC 06/09
 
  SC   0959
 
Insulin Detemir 22 UNITS BID 06/09 2100 AC 06/09
 
  SC   2218
 
Insulin Detemir 18 UNITS BID 06/08 2100 DC 06/09
 
  SC   0959
 
Losartan Potassium 25 MG DAILY 06/08 0900 AC 06/09
 
  PO   1000
 
Metoclopramide HCl 10 MG TID 06/09 1400 AC 06/09
 
  PO   2219
 
Metoclopramide HCl 10 MG TIDAC 06/08 0945 DC 06/09
 
  IV   1000
 
Omeprazole 40 MG DAILY AC 06/08 0700 AC 06/10
 
  PO   0608
 
Pregabalin 150 MG DAILY 06/08 0900 AC 06/09
 
  PO   0959
 
 
 
 
Findings
Pertinent Lab/Rayshawn Results:
 Laboratory Tests
 
 
 06/10 06/08
 
 0623 1105
 
Chemistry  
 
  Sodium (137 - 145 mmol/L) 139 
 
  Potassium (3.5 - 5.1 mmol/L) 3.4  L 
 
  Chloride (98 - 107 mmol/L) 101 
 
  Carbon Dioxide (22 - 30 mmol/L) 25 
 
  Anion Gap (5 - 16) 13 
 
  BUN (9 - 20 mg/dL) 20 
 
  Creatinine (0.7 - 1.2 mg/dL) 1.0 
 
  Estimated GFR (>60 ml/min) > 60 
 
  BUN/Creatinine Ratio (7 - 25 %) 20.0 
 
  Troponin I (<0.11 ng/ml)  < 0.01

## 2018-06-10 NOTE — PATIENT DISCHARGE INSTRUCTIONS
Discharge Instructions
 
General Discharge Information
You were seen/treated for:
Nausea/Vomitting
Diabetes
Chronic Diarrhea
Special Instructions:
Follow up with your GI doctor, endocrinologist and pcp within 1 week of 
discharge 
Note the medication changes that have been made 
 
Diet
Continue normal diet: No
Recommended Diet: Diabetic
 
Activity
Full Activity/No Limits: No
Activity Self Limited: Yes
 
Acute Coronary Syndrome
 
Inclusion Criteria
At DC or during hospital stay patient has or had the following:
ACS DIAGNOSIS No
 
Discharge Core Measures
Meds if any: Prescribed or Continued at Discharge
Meds if any: NOT Prescribed or Continued at Discharge
 
Congestive Heart Failure
 
Inclusion Criteria
At DC or during hospital stay patient has or had the following:
CHF DIAGNOSIS No
 
Discharge Core Measures
Meds if any: Prescribed or Continued at Discharge
Meds if any: NOT Prescribed or Continued at Discharge
 
Cerebrovascular accident
 
Inclusion Criteria
At DC or during hospital stay patient has or had the following:
CVA/TIA Diagnosis No
 
Discharge Core Measures
Meds if any: Prescribed or Continued at Discharge
Meds if any: NOT Prescribed or Continued at Discharge
 
Venous thromboembolism
 
Inclusion Criteria
VTE Diagnosis No
VTE Type NONE
VTE Confirmed by (Test) NONE
 
Discharge Core Measures
- Per Current guidelines, there needs to be overlap
- treatment for the first 5 days of Warfarin therapy.
- If discharged on Warfarin prior to 5 days of
- overlap therapy, the patient will need to be
- assessed for post discharge needs including
- *Post discharge parental anticoagulation
- *Warfarin and/or parental anticoagulation education
- *Follow up date to check INR post discharge
At least 5 days overlap therapy as Inpatient No
Meds if any: Prescribed or Continued at Discharge
Note: Overlap Therapy is Warfarin and Anticoagulant
Meds if any: NOT Prescribed or Continued at Discharge

## 2018-06-10 NOTE — PN- HOUSESTAFF
Subjective
Follow-up For:
nausea/vomiting
Subjective:
Patient seen and examined today. Patient reports chronic diarrhea. Nausea and 
vomitting resolved with medication. Denies any other complaints today. 
 
Afebrile 
B, 277, 242, 254
 
Review of Systems
Constitutional:
Reports: see HPI. 
 
Objective
Last 24 Hrs of Vital Signs/I&O
 Vital Signs
 
 
Date Time Temp Pulse Resp B/P B/P Pulse O2 O2 Flow FiO2
 
     Mean Ox Delivery Rate 
 
06/10 0540 97.9 94 20 128/78  96   
 
06/10 0000       Room Air  
 
 2144 99.0 109 18 124/75  95   
 
/ 1904      96  Room Air 
 
 1413 98.6 90 18 122/60  96 Room Air  
 
 1000    144/82     
 
 
 Intake & Output
 
 
 06/10 1600 06/10 0800 06/10 0000
 
Intake Total  100 100
 
Output Total   
 
Balance  100 100
 
    
 
Intake, Oral  100 100
 
Number  2 5
 
Bowel   
 
Movements   
 
 
 
 
Physical Exam
General Appearance: Alert, Oriented X3, Cooperative, No Acute Distress
HEENT: Atraumatic, Mucous Membr. moist/pink
Cardiovascular: Regular Rate, Normal S1, Normal S2
Lungs: Clear to Auscultation, Normal Air Movement
Abdomen: Normal Bowel Sounds, Soft, No Tenderness
Extremities: No Clubbing, No Cyanosis, No Edema, Normal Pulses, No Tenderness/
Swelling
Current Medications:
 Current Medications
 
 
  Sig/Rubina Start time  Last
 
Medication Dose Route Stop Time Status Admin
 
Acetaminophen 650 MG .STK-MED ONE  1730 DC 
 
  PO  1731  
 
Acetaminophen 650 MG Q6-PRN PRN  1730 AC 06/10
 
  PO   0446
 
Albuterol Sulfate 2 PUF Q4P PRN  1900 AC 
 
  INH   
 
Albuterol Sulfate 3 ML Q4P PRN  1045 DC 
 
  INH   1837
 
Benzonatate 100 MG TID  1707 AC 
 
  PO   2219
 
Enoxaparin Sodium 40 MG DAILY 900 AC 
 
  SC   1000
 
Insulin Aspart 0 TIDAC/HS  1210 AC 
 
  SC   2219
 
Insulin Aspart 0 TIDAC  08 DC 
 
  SC   0959
 
Insulin Detemir 22 UNITS BID  2100 AC 
 
  SC   2218
 
Insulin Detemir 18 UNITS BID  2100 DC 
 
  SC   0959
 
Losartan Potassium 25 MG DAILY 900 AC 
 
  PO   1000
 
Metoclopramide HCl 10 MG TID  1400 AC 
 
  PO   2219
 
Metoclopramide HCl 10 MG TIDAC  0945 DC 
 
  IV   1000
 
Omeprazole 40 MG DAILY AC  0700 AC 06/10
 
  PO   0608
 
Pregabalin 150 MG DAILY  0900 AC 
 
  PO   0959
 
 
 
 
Last 24 Hrs of Lab/Rayshawn Results
Last 24 Hrs of Labs/Mics:
 Laboratory Tests
 
06/10/18 0623:
Sodium Pending, Potassium Pending, Chloride Pending, Carbon Dioxide Pending, 
Anion Gap Pending, BUN Pending, Creatinine Pending, BUN/Creatinine Ratio Pending
 
 
Assessment/Plan
Assessment:
38-year-old gentleman with a PMH significant for TBI, type 1 diabetes mellitus, 
HTN, HLD, degenerative disc disease, sciatica, asthma, chronic diarrhea 
presented to the ED with complains of Nausea and vomitting. 
 
Assessment and Plan:
 
Nausea and vomiting - resolved
No acute finding on CT Abd/Pelvis 
Infectious work up negative
Patient tolerting PO intake 
 
Will send home with reglan TID to be takkne 30 minutes before meals. 
continue PPI
 
 
Uncontrolled type 1 diabetes mellitus
Blood sugars are stable on adjusted levemir and novolog SS 
 
Will send home on new sliding scale for meal time and bedtime coverage and 
levemir at 22 units BID
 
Nonspecific ST-T changes on EKG
 
trop neg x2 on admission
 
Left lower lobe opacity suspicious for pneumonia.
On CT scan focal consolidation within the left lower lobe with air bronchograms 
not
suspicious for an infectious process
 
- afebrile
- watch off abx
 
Chronic diarrhea
Not concerned for infectious etiology. Patient reports that he has been worked 
up and sees Dr. Marrero. Has been taking bentyl and loperamide at home. 
 
-Resume loperamide and bentyl
 
Diet: Regular
DVT PPX: SC Enox
Code: Full Code
Problem List:
 1. Nausea & vomiting
 
Pain Ratin
Pain Location:
n/a
Pain Goal: Remain pain free
Pain Plan:
n/a
Tomorrow's Labs & Rationales:
none - dc home today

## 2018-06-10 NOTE — PN- ATT ADDEND
Attending Addendum
Attending Brief Note
Patient seen and examined.  Plan of care discussed with the medical team and the
patient.  Available lab work and radiology test reports were reviewed.  Patient 
overall feels improved and nausea vomiting has decreased.  His chart having the 
diarrhea yesterday and up to 5 bowel motion per day.  He apparently has history 
of chronic diarrhea and currently takes Imodium at home which she has not been 
getting here.  Denies any recent fever chills or chest pain difficulty breathing
or abdominal pain.  There are no features of Dysentry.
 
Assessment
* Nausea and vomiting likely due to gastroparesis related to diabetes
* Diabetes type 1- poorly controlled with hyperglycemia with glucose levels 313 
by Accu-Chek this morning
* History of peripheral neuropathy
* History of hypertension 
* Suspected pneumonia based on findings on CT scan and chest x-ray- although 
patient does not have any fever or the BBC count elevation and does not have any
clinical symptoms 
* Chronic diarrhea- his clinical picture does not suggest C. difficile
 
 Plan
* Continue  Reglan oral as needed 10 mg half-hour before meals
* Restart Imodium and Bentyl
* Okay to discharge patient home
 
Exam:
General: Patient awake alert oriented without any distress 
CVS: S1 plus S2 without any murmur or gallops 
Chest: Few scattered crepitation without any wheeze.  There is no respiratory 
distress. 
Abdomen: Soft non-tender, bowel sound present, no guarding or rebound 
CNS: Awake alert oriented without any focal neuro deficit and follows commands  
appropriately 
Extremities: No edema; no clubbing or cyanosis noted 
 
 
 Current Medications
 
 
  Sig/Rubina Start time  Last
 
Medication Dose Route Stop Time Status Admin
 
Acetaminophen 650 MG .STK-MED ONE 06/09 1730 DC 
 
  PO 06/09 1731  
 
Acetaminophen 650 MG Q6-PRN PRN 06/08 1730 AC 06/10
 
  PO   0446
 
Albuterol Sulfate 2 PUF Q4P PRN 06/09 1900 AC 
 
  INH   
 
Albuterol Sulfate 3 ML Q4P PRN 06/08 1045 DC 06/09
 
  INH   1837
 
Benzonatate 100 MG TID 06/09 1707 AC 06/10
 
  PO   1010
 
Enoxaparin Sodium 40 MG DAILY 06/08 0900 AC 06/09
 
  SC   1000
 
Insulin Aspart 0 TIDAC/HS 06/09 1210 AC 06/10
 
  SC   0917
 
Insulin Aspart 0 TIDAC 06/08 0800 DC 06/09
 
  SC   0959
 
Insulin Detemir 22 UNITS BID 06/09 2100 AC 06/10
 
  SC   1007
 
Insulin Detemir 18 UNITS BID 06/08 2100 DC 06/09
 
  SC   0959
 
Losartan Potassium 25 MG DAILY 06/08 0900 AC 06/10
 
  PO   1007
 
Metoclopramide HCl 10 MG TID 06/09 1400 AC 06/10
 
  PO   1006
 
Metoclopramide HCl 10 MG TIDAC 06/08 0945 DC 06/09
 
  IV   1000
 
Omeprazole 40 MG DAILY AC 06/08 0700 AC 06/10
 
  PO   0608
 
Pregabalin 150 MG DAILY 06/08 0900 AC 06/10
 
  PO   1008
 
 
 Laboratory Tests
 
 
 06/10
 
 0623
 
Chemistry 
 
  Sodium (137 - 145 mmol/L) 139
 
  Potassium (3.5 - 5.1 mmol/L) 3.4  L
 
  Chloride (98 - 107 mmol/L) 101
 
  Carbon Dioxide (22 - 30 mmol/L) 25
 
  Anion Gap (5 - 16) 13
 
  BUN (9 - 20 mg/dL) 20
 
  Creatinine (0.7 - 1.2 mg/dL) 1.0
 
  Estimated GFR (>60 ml/min) > 60
 
  BUN/Creatinine Ratio (7 - 25 %) 20.0
 
 
 Vital Signs
 
 
Date Time Temp Pulse Resp B/P B/P Pulse O2 O2 Flow FiO2
 
     Mean Ox Delivery Rate 
 
06/10 1007 97.9 94 20      
 
06/10 0540 97.9 94 20 128/78  96   
 
06/10 0000       Room Air  
 
06/09 2144 99.0 109 18 124/75  95   
 
06/09 1904      96  Room Air 
 
06/09 1413 98.6 90 18 122/60  96 Room Air  
 
 
 Intake & Output
 
 
 06/10 1600 06/10 0800 06/10 0000
 
Intake Total  100 100
 
Output Total   
 
Balance  100 100
 
    
 
Intake, Oral  100 100
 
Number  2 5
 
Bowel   
 
Movements

## 2019-01-07 NOTE — CONS- [facility]
General Information and HPI
 
Consulting Request
Date of Consult: 17
Requested By:
Zee Guzman MD
 
Reason for Consult:
DKA
 
Source of Information: patient, old records
Exam Limitations: clinical condition
History of Present Illness:
37 y/o M with PMHx of T1DM c/b peripheral neuropathy, HTN and depression who 
presented to the ED on 17 with nausea, abdominal pain, generalized weakness
, polyuria and polydipsia after stopping eating and taking his insulin because 
he was feeling depressed. On admission, he endorsed chest pain which is chronic 
and mild non-productive cough, for which he was recently seen in the [place] ED 
(17) and started on a course of azithromycin that he has completed. He 
denied fever, chills or shortness of breath. He also denied homicidal or 
suicidal ideation. On initial presentation in the ED, vitals were normal except 
for tachycardia with HR in the 110s. Physical exam was remarkable for extremely 
dry mucous membranes. Patient was AAO x 3 and appropriately answering questions.
Labs revealed WBC 9.3, creatinine 2.5 increased from 1.1 on 17, K 5.4, 
anion gap metabolic acidosis with bicarbonate 17 and anion gap 21 and blood 
glucose 556. Urine was positive for glucose and acetone. Patient was fluid 
resuscitated with NS boluses, started on insulin drip and admitted to the ICU 
for DKA.
 
Allergies/Medications
Allergies:
Coded Allergies:
Penicillins (ANAPHYLAXIS 17)
aspirin (ANAPHYLAXIS 17)
 
Home Med List:
Albuterol Sulfate (Proair Hfa) 90 MCG HFA.AER.AD   2 PUF INH Q4-6 PRN PRN SOB
Atorvastatin Calcium 20 MG TABLET   1 TAB PO DAILY CHOLESTEROL  (Reported)
Ergocalciferol (Vitamin D2) (Vitamin D2) 50,000 UNIT CAPSULE   1 CAP PO Q2W 
SUPPLEMENT  (Reported)
Insulin Detemir (Levemir Flextouch) 100 UNIT/ML (3 ML) INSULN.PEN   20 UNIT SC 
BID DM  (Reported)
Insulin Lispro (Humalog Kwikpen U-100) 100 UNIT/ML INSULN.PEN DM  (Reported)
Lisinopril 20 MG TABLET   1 TAB PO DAILY BP  (Reported)
Omeprazole 40 MG CAPSULE.DR   1 CAP PO DAILY GI  (Reported)
Pregabalin (Lyrica) 150 MG CAPSULE   1 CAP PO DAILY NERVE PAIN  (Reported)
 
Current Medications:
 Current Medications
 
 
  Sig/Rubina Start time  Last
 
Medication Dose Route Stop Time Status Admin
 
Acetaminophen 1,000 MG Q6P PRN  1300 AC 
 
N/A 1 UNIT IV   1253
 
Acetaminophen 650 MG ONCE ONE  0815 DC 
 
  PO  0816  0819
 
Albuterol Sulfate 2 PUF Q4-6 PRN PRN  0130 AC 
 
  INH   
 
Baclofen 10 MG TID  0632 DC 
 
  PO   0819
 
Famotidine 20 MG ONCE ONE  0015 DC 
 
  IV  0016  0021
 
Famotidine 0 .STK-MED ONE  0010 DC 
 
  IV   
 
Heparin Sodium  5,000 UNIT Q8  1400 AC 
 
(Porcine)  SC   1414
 
Ibuprofen 600 MG ONCE ONE  1445 DC 
 
  PO  1446  1627
 
Insulin Aspart 0 AT BEDTIME  2200 DC 
 
  SC   
 
Insulin Aspart 0 AT BEDTIME  2200 AC 
 
  SC   
 
Insulin Aspart 0 Q6  1200 CAN 
 
  SC   
 
Insulin Aspart 0 TIDAC  1200 AC 
 
  SC   1636
 
Insulin Aspart 4 UNITS ONCE ONE  0945 DC 
 
  SC  0946  0940
 
Insulin Aspart 3 UNITS ONCE ONE  0930 CAN 
 
  SC  0931  
 
Insulin Aspart 0 TIDAC  0800 DC 
 
  SC   
 
Insulin Aspart 4 UNITS ONCE PRN  0645 DC 
 
  SC   
 
Insulin Detemir 20 UNITS BID  1000 AC 
 
  SC   0631
 
Insulin Human Regular 100 UNIT Q12H  0300 DC 
 
Sodium Chloride 100 ML IV  0835  
 
Insulin Human Regular 100 UNIT Q24H  2345 DC 
 
Sodium Chloride 100 ML IV   0040
 
Insulin Human Regular 8 UNITS ONCE ONE  2345 DC 
 
  IV  2346  0040
 
Ondansetron HCl 4 MG Q6P PRN  0845 AC 
 
  IV   0840
 
Ondansetron HCl 0 .STK-MED ONE  2215 DC 
 
  .ROUTE   
 
Ondansetron HCl 4 MG ONCE ONE  2200 DC 
 
  IV  2201  2215
 
Potassium Chloride 20 MEQ Q8H  0930 DC 
 
Dextrose/Sodium  1,000 ML IV   
 
Chloride     
 
Potassium Chloride 20 MEQ Q8H  0245 DC 
 
Dextrose/Sodium  1,000 ML IV   0305
 
Chloride     
 
Sodium Chloride 1,000 ML Q8H  0945 AC 
 
  IV 02/15 0144  
 
Sodium Chloride 1,000 ML Q8H  0830 DC /
 
  IV   0836
 
Sodium Chloride 1,000 ML BOLUS ONE  0115 DC /
 
  IV  0314  0159
 
Sodium Chloride 1,000 ML BOLUS ONE  2200 DC /
 
  IV  2259  2215
 
Sodium Chloride 1,000 ML BOLUS ONE  2200 DC /
 
  IV  2259  0006
 
Sodium Chloride 1,000 ML BOLUS ONE  2200 DC 
 
  IV  225  
 
 
 
 
Review of Systems
 
Review of Systems
Constitutional:
Reports: weakness.  Denies: chills, fever. 
EENTM:
Reports: no symptoms. 
Cardiovascular:
Reports: chest pain (chronic). 
Respiratory:
Reports: cough.  Denies: short of breath, sputum production. 
GI:
Reports: abdominal pain, diarrhea, nausea, vomiting. 
Genitourinary:
Reports: no symptoms. 
Musculoskeletal:
Reports: no symptoms. 
Skin:
Reports: no symptoms. 
Neurological/Psychological:
Reports: depressed. 
Hematologic/Endocrine:
Reports: no symptoms. 
Immunologic/Allergic:
Reports: no symptoms. 
All Other Systems: Reviewed and Negative
 
Past History
 
Travel History
Traveled to [place] past 21 day No
 
Medical History
Blood Transfusion Hx: No
Neurological: NONE
EENT: NONE
Cardiovascular: hypertension, hyperlipidemia
Respiratory: NONE
Gastrointestinal: irritable bowel syndrome
Hepatic: NONE
Renal: NONE
Musculoskeletal: NONE
Psychiatric: depression
Endocrine: diabetes
Blood Disorders: NONE
Cancer(s): NONE
GYN/Reproductive: NONE
 
Surgical History
Surgical History: non-contributory
 
Family History
Relations & Conditions If Any:
FATHER, , Age 50-60; Cause: Cancer.
 
 
Psychosocial History
Where Do You Live? Other ([place])
Who Do You Live With? spouse, child
Services at Home: None
Primary Language: English
Smoking Status: Former Smoker
ETOH Use: occasional use, Social Drinker 
Illicit Drug Use: denies illicit drug use
 
Functional Ability
ADLs
Independent: dressing, eating, toileting, bathing. 
Ambulation: independent
IADLs
Independent: shopping, housework, finances, food prep, telephone, transportation
, medication admin. 
 
Employment History
Employment: Unemployed
 
Exam & Diagnostic Data
Last 24 Hrs of Vital Signs/I&O
 Vital Signs
 
 
Date Time Temp Pulse Resp B/P Pulse O2 O2 Flow FiO2
 
     Ox Delivery Rate 
 
800 98.0 103 20 118/60 96 Room Air  
 
 0800     96 Room Air  
 
 0317     97 Room Air Room Air 
 
 0142 98.2 108 20 131/64 97 Room Air  
 
 0032 98.3 113 18 121/57 100 Room Air  
 
 2248      Room Air  
 
 2216 97.5 110 20 144/65 100 Room Air  
 
 
 Intake & Output
 
 
  1600  0800  0000
 
Intake Total  477 2000
 
Output Total  500 500
 
Balance  -23 1500
 
    
 
Intake, IV  477 2000
 
Output, Urine  500 500
 
Patient  102.058 kg 90.718 kg
 
Weight   
 
 
 
 
Physical Exam
General Appearance: well developed/nourished, no apparent distress, lethargic, 
arousable
Ears, Nose, Throat: moist mucus membranes
Respiratory: lungs clear
Cardiovascular: regular rate/rhythm, normal S1 and S2, no murmurs, rubs or 
gallops
Gastrointestinal: soft, non-tender, non-distended, positive bowel sounds
Extremities: no edema
Neurologic/Psych: no gross focal deficits noted
Skin: intact, normal color, warm/dry
Last 48 Hrs of Labs/[place]:
 Laboratory Tests
17 0525:
pH 7.41, pCO2 32  L, pO2 76  L, HCO3 20  L, ABG O2 Sat (Measured) 95.0  L, P-50 
(Temp Corrected) Y, Carboxyhemoglobin 0.7  L, O2 Concentration % RA, Temperature
98.8, Phlebotomy Draw Site LEFT RADIAL
 
17 0500:
Troponin I < 0.01
 
17 0500:
Anion Gap 15, Estimated GFR 46  L, Glucose 151  H, Calcium 9.3, Phosphorus 2.8, 
Magnesium 2.3, Total Bilirubin 0.5, AST 17, ALT 50, Albumin 3.7
 
17 0251:
Serum Alcohol Cancelled
 
17 0251:
Methadone Screen Cancelled, Barbiturate Screen Cancelled, Ur Phencyclidine Scrn 
Cancelled, Amphetamines Screen Cancelled, U Benzodiazepines Scrn Cancelled, 
Urine Cocaine Screen Cancelled, Urine Cannabis Screen Cancelled
 
17 0155:
Anion Gap 23  H, Estimated GFR 38  L, BUN/Creatinine Ratio 19.5
 
17 2340:
Urine Opiates Screen < 100.00, Methadone Screen < 40, Barbiturate Screen < 60, 
Ur Phencyclidine Scrn < 6.00, Amphetamines Screen < 100, U Benzodiazepines Scrn 
< 85, Urine Cocaine Screen < 50, Urine Cannabis Screen < 5.00, Urinalysis MOD  H
, Urine Color YEL, Urine Clarity CLEAR, Urine pH 5.5, Ur Specific Gravity 1.025,
Urine Protein TRACE  H, Urine Ketones 40  H, Urine Nitrite NEG, Urine Bilirubin 
POS@ICTO  H, Urine Urobilinogen 0.2, Ur Leukocyte Esterase NEG, Ur Microscopic 
SEDIMENT EXAMINED, Urine RBC 1-3, Urine WBC 1-3  H, Ur Epithelial Cells FEW, 
Urine Mucus MOD  H, Urine Hemoglobin NEG, Urine Glucose >=1000  H
 
178:
Anion Gap 21  H, Estimated GFR 30  L, BUN/Creatinine Ratio 16.8, Glucose 556 *H,
Lactic Acid 1.4, Calcium 9.7, Total Bilirubin 0.8, AST 19, ALT 54, Alkaline 
Phosphatase 156  H, Troponin I < 0.01, Total Protein 7.0, Albumin 4.2, Globulin 
2.8, Albumin/Globulin Ratio 1.5, Amylase < 30  L, Lipase 32, CBC w Diff NO MAN 
DIFF REQ, RBC 4.68  L, MCV 85.3, MCH 29.0, RDW 13.3, MPV 7.5, Gran % 80.2  H, 
Lymphocytes % 14.3  L, Monocytes % 5.2, Eosinophils % 0, Basophils % 0.3, 
Absolute Granulocytes 7.4  H, Absolute Lymphocytes 1.3, Absolute Monocytes 0.5, 
Absolute Eosinophils 0, Absolute Basophils 0, PUBS MCHC 34.0, Serum Alcohol < 
10.0, Acetone Level POSITIVE AT 1:16 DIL
 
17:
Bicarbonate Actual 16  L, Mixed VBG pH 7.34, Mixed VBG pCO2 31  L, Mixed VBG O2 
Saturation 37, Carboxyhemoglobin 1.0  L, O2 Concentration % R/A, Phlebotomy Draw
Site VENOUS
 
 
Diagnostic Data
EKG Results
Sinus tachycardia

QTc 488
 
Assessment/Plan
Impression/Plan:
37 y/o M with PMHx of T1DM c/b peripheral neuropathy, HTN and depression who is 
admitted for DKA.
 
Respiratory: Stable. No shortness of breath or respiratory distress. Satting 
well on room air.
* Albuterol PRN for shortness of breath.
* Provide supplemental oxygen as needed to keep SpO2 > 92%.
 
Infectious Diseases: Afebrile and without leukocytosis. No signs or symptoms of 
infection. 
 
Cardiovascular:
#HTN: Takes lisinopril 20 mg PO QD.
* Holding [id number] lisinopril in the setting of CHAZ.
 
Hematology: H/H 13.6/39.9 on admission. No issues.
 
Metabolic: 
#DKA: Secondary to stopping his insulin and poor PO intake. Presented with anion
gap metabolic acidosis with bicarbonate down to jaclyn of 12 and anion gap of 23.
ABG 7.41/32/76/20. Patient was started on insulin drip and IVF (20 mEq of KCl in
D5W-1/2NS @ 150 cc/hr) with subsequent closure of the anion gap and improvement 
of acidosis. DKA has resolved and patient has started eating.
* Endocrinology following. Appreciate their recs.
* Insulin drip discontinued.
* Patient started on Levemir 20 U SQ BID, Novolog SSI TIDAC (for glucose  
mg/dl give 4 units of insulin, 151-200 mg/dl 6 units, 201-250 mg/dl 8 units, 251
-300 mg/dl 9 units, 301-350 mg/dl 10 units and 351-400 mg/dl 11 units) and 
Novolog SSI QHS (for glucose 251-300 mg/dl give 2 units of insulin, 301-350 mg/
dl 3 units and 351-400 mg/dl 4 units). 
* Zofran 4 mg IV Q6H PRN for nausea.
* OK to downgrade to general medicine.
 
#CHAZ: Creatinine 2.5 on admission, increased from previous value of 1.1 on . Likely pre-renal 2/2 dehydration in the setting of poor PO intake and DKA. 
Received 20 mEq of KCl in D5W-1/2NS @ 150 cc/hr. Creatinine improved to 1.7 with
IVF.
* Switch fluids to NS @ 125 cc/hr.
* Continue to monitor lytes and kidney function.
 
Alimentary: Consistent Carbohydrate 2
 
Neurological: AAO x 3.
#Depression: No suicidal or homicidal ideation.
* Patient is not interested in psychiatric evaluation.
 
Skin: No issues.
 
Lytes: Replete to K > 4 and Mg > 2
 
CODE: FULL
 
Problem List:
 1. DKA (diabetic ketoacidoses)
 
 2. Acute renal failure
 
 3. Depression
 
 4. Type 1 diabetes mellitus
 
 
Consult Acknowledgment
- Thank you for your consult request. Patient's appointment was rescheduled per her request. She will come early that afternoon to be seen after seeing her pcp.